# Patient Record
Sex: FEMALE | Race: WHITE | NOT HISPANIC OR LATINO | URBAN - METROPOLITAN AREA
[De-identification: names, ages, dates, MRNs, and addresses within clinical notes are randomized per-mention and may not be internally consistent; named-entity substitution may affect disease eponyms.]

---

## 2017-04-07 RX ORDER — DROXIDOPA 200 MG/1
CAPSULE ORAL
Qty: 90 CAPSULE | OUTPATIENT
Start: 2017-04-07 | End: 2018-11-30

## 2017-04-07 RX ORDER — DROXIDOPA 300 MG/1
CAPSULE ORAL
Qty: 90 CAPSULE | Status: SHIPPED | OUTPATIENT
Start: 2017-04-07 | End: 2017-06-09 | Stop reason: SDUPTHER

## 2017-04-24 ENCOUNTER — TELEPHONE (OUTPATIENT)
Dept: CARDIOLOGY | Facility: CLINIC | Age: 22
End: 2017-04-24

## 2017-04-24 NOTE — TELEPHONE ENCOUNTER
Mom jaiden called stated Ammon had an episode 3 weeks ago and she went to ER at [facility] and they gave her fluids then last night she had another one with CP, arms tingling, numbness in arm and pseudoseizures. Pt told to go home and take valium and pain medication. Mom stated that she has not missed any doses of Northera 500mg TID. Mom doesn't know if they need to go up on dose or wait and see if she has another episode. She is in her 2nd semester of college and mom thinks it could be stress related because she had been doing well. Please advise

## 2017-04-26 RX ORDER — BISOPROLOL FUMARATE 5 MG/1
5 TABLET, FILM COATED ORAL 2 TIMES DAILY
Qty: 60 TABLET | Refills: 5 | Status: SHIPPED | OUTPATIENT
Start: 2017-04-26 | End: 2017-10-25 | Stop reason: SDUPTHER

## 2017-05-16 ENCOUNTER — OFFICE VISIT (OUTPATIENT)
Dept: CARDIOLOGY | Facility: CLINIC | Age: 22
End: 2017-05-16

## 2017-05-16 VITALS
DIASTOLIC BLOOD PRESSURE: 80 MMHG | HEIGHT: 71 IN | BODY MASS INDEX: 38.98 KG/M2 | HEART RATE: 69 BPM | SYSTOLIC BLOOD PRESSURE: 128 MMHG | WEIGHT: 278.4 LBS

## 2017-05-16 DIAGNOSIS — R55 VASOVAGAL SYNCOPE: Primary | ICD-10-CM

## 2017-05-16 PROCEDURE — 99213 OFFICE O/P EST LOW 20 MIN: CPT | Performed by: INTERNAL MEDICINE

## 2017-05-31 RX ORDER — FLUDROCORTISONE ACETATE 0.1 MG/1
TABLET ORAL
Qty: 30 TABLET | Refills: 1 | Status: SHIPPED | OUTPATIENT
Start: 2017-05-31 | End: 2017-07-27 | Stop reason: SDUPTHER

## 2017-06-09 RX ORDER — DROXIDOPA 300 MG/1
300 CAPSULE ORAL 3 TIMES DAILY
Qty: 90 CAPSULE | Refills: 9 | Status: SHIPPED | OUTPATIENT
Start: 2017-06-09 | End: 2018-10-31 | Stop reason: SDUPTHER

## 2017-06-29 RX ORDER — CITALOPRAM 40 MG/1
40 TABLET ORAL DAILY
Qty: 30 TABLET | Refills: 11 | Status: SHIPPED | OUTPATIENT
Start: 2018-02-28 | End: 2019-07-09

## 2017-07-24 ENCOUNTER — LAB (OUTPATIENT)
Dept: LAB | Facility: HOSPITAL | Age: 22
End: 2017-07-24

## 2017-07-24 ENCOUNTER — TRANSCRIBE ORDERS (OUTPATIENT)
Dept: LAB | Facility: HOSPITAL | Age: 22
End: 2017-07-24

## 2017-07-24 DIAGNOSIS — M79.0 RHEUMATISM, UNSPECIFIED AND FIBROSITIS: ICD-10-CM

## 2017-07-24 DIAGNOSIS — I95.1 ORTHOSTATIC HYPOTENSION: Primary | ICD-10-CM

## 2017-07-24 DIAGNOSIS — R00.0 TACHYCARDIA, UNSPECIFIED: ICD-10-CM

## 2017-07-24 DIAGNOSIS — M79.7 RHEUMATISM, UNSPECIFIED AND FIBROSITIS: ICD-10-CM

## 2017-07-24 DIAGNOSIS — R50.9 HYPERTHERMIA-INDUCED DEFECT: ICD-10-CM

## 2017-07-24 DIAGNOSIS — I95.1 ORTHOSTATIC HYPOTENSION: ICD-10-CM

## 2017-07-24 LAB
25(OH)D3 SERPL-MCNC: 37.4 NG/ML
ALBUMIN SERPL-MCNC: 4.4 G/DL (ref 3.2–4.8)
ALBUMIN/GLOB SERPL: 1.6 G/DL (ref 1.5–2.5)
ALP SERPL-CCNC: 118 U/L (ref 25–100)
ALT SERPL W P-5'-P-CCNC: 28 U/L (ref 7–40)
ANION GAP SERPL CALCULATED.3IONS-SCNC: 10 MMOL/L (ref 3–11)
ARTICHOKE IGE QN: 105 MG/DL (ref 0–130)
AST SERPL-CCNC: 20 U/L (ref 0–33)
BASOPHILS # BLD AUTO: 0.02 10*3/MM3 (ref 0–0.2)
BASOPHILS NFR BLD AUTO: 0.1 % (ref 0–1)
BILIRUB SERPL-MCNC: 0.6 MG/DL (ref 0.3–1.2)
BUN BLD-MCNC: 5 MG/DL (ref 9–23)
BUN/CREAT SERPL: 6.3 (ref 7–25)
CALCIUM SPEC-SCNC: 9.4 MG/DL (ref 8.7–10.4)
CHLORIDE SERPL-SCNC: 102 MMOL/L (ref 99–109)
CHOLEST SERPL-MCNC: 146 MG/DL (ref 0–200)
CO2 SERPL-SCNC: 26 MMOL/L (ref 20–31)
CREAT BLD-MCNC: 0.8 MG/DL (ref 0.6–1.3)
DEPRECATED RDW RBC AUTO: 41.9 FL (ref 37–54)
EOSINOPHIL # BLD AUTO: 0.01 10*3/MM3 (ref 0–0.3)
EOSINOPHIL NFR BLD AUTO: 0.1 % (ref 0–3)
ERYTHROCYTE [DISTWIDTH] IN BLOOD BY AUTOMATED COUNT: 13.7 % (ref 11.3–14.5)
GFR SERPL CREATININE-BSD FRML MDRD: 90 ML/MIN/1.73
GLOBULIN UR ELPH-MCNC: 2.8 GM/DL
GLUCOSE BLD-MCNC: 134 MG/DL (ref 70–100)
HCT VFR BLD AUTO: 39.3 % (ref 34.5–44)
HDLC SERPL-MCNC: 44 MG/DL (ref 40–60)
HGB BLD-MCNC: 12.5 G/DL (ref 11.5–15.5)
IMM GRANULOCYTES # BLD: 0.06 10*3/MM3 (ref 0–0.03)
IMM GRANULOCYTES NFR BLD: 0.3 % (ref 0–0.6)
LYMPHOCYTES # BLD AUTO: 0.95 10*3/MM3 (ref 0.6–4.8)
LYMPHOCYTES NFR BLD AUTO: 5.2 % (ref 24–44)
MCH RBC QN AUTO: 26.8 PG (ref 27–31)
MCHC RBC AUTO-ENTMCNC: 31.8 G/DL (ref 32–36)
MCV RBC AUTO: 84.2 FL (ref 80–99)
MONOCYTES # BLD AUTO: 0.86 10*3/MM3 (ref 0–1)
MONOCYTES NFR BLD AUTO: 4.7 % (ref 0–12)
NEUTROPHILS # BLD AUTO: 16.44 10*3/MM3 (ref 1.5–8.3)
NEUTROPHILS NFR BLD AUTO: 89.6 % (ref 41–71)
PLATELET # BLD AUTO: 298 10*3/MM3 (ref 150–450)
PMV BLD AUTO: 10 FL (ref 6–12)
POTASSIUM BLD-SCNC: 3.7 MMOL/L (ref 3.5–5.5)
PROT SERPL-MCNC: 7.2 G/DL (ref 5.7–8.2)
RBC # BLD AUTO: 4.67 10*6/MM3 (ref 3.89–5.14)
SODIUM BLD-SCNC: 138 MMOL/L (ref 132–146)
TRIGL SERPL-MCNC: 72 MG/DL (ref 0–150)
TSH SERPL DL<=0.05 MIU/L-ACNC: 1.12 MIU/ML (ref 0.35–5.35)
WBC NRBC COR # BLD: 18.34 10*3/MM3 (ref 3.5–10.8)

## 2017-07-24 PROCEDURE — 80053 COMPREHEN METABOLIC PANEL: CPT | Performed by: INTERNAL MEDICINE

## 2017-07-24 PROCEDURE — 36415 COLL VENOUS BLD VENIPUNCTURE: CPT

## 2017-07-24 PROCEDURE — 85025 COMPLETE CBC W/AUTO DIFF WBC: CPT | Performed by: INTERNAL MEDICINE

## 2017-07-24 PROCEDURE — 82306 VITAMIN D 25 HYDROXY: CPT | Performed by: INTERNAL MEDICINE

## 2017-07-24 PROCEDURE — 84443 ASSAY THYROID STIM HORMONE: CPT | Performed by: INTERNAL MEDICINE

## 2017-07-24 PROCEDURE — 80061 LIPID PANEL: CPT | Performed by: INTERNAL MEDICINE

## 2017-07-28 RX ORDER — FLUDROCORTISONE ACETATE 0.1 MG/1
TABLET ORAL
Qty: 30 TABLET | Refills: 5 | Status: SHIPPED | OUTPATIENT
Start: 2017-07-28 | End: 2018-02-02 | Stop reason: SDUPTHER

## 2017-09-15 ENCOUNTER — TELEPHONE (OUTPATIENT)
Dept: CARDIOLOGY | Facility: CLINIC | Age: 22
End: 2017-09-15

## 2017-09-15 NOTE — TELEPHONE ENCOUNTER
Called [facility] to get PA on Northera (300mg and 200mg) to take 500mg 1 TID. It will be 72 hrs before a decision is made.

## 2017-09-25 ENCOUNTER — HOSPITAL ENCOUNTER (EMERGENCY)
Facility: HOSPITAL | Age: 22
Discharge: HOME | End: 2017-09-25
Attending: EMERGENCY MEDICINE | Admitting: EMERGENCY MEDICINE

## 2017-09-25 ENCOUNTER — APPOINTMENT (OUTPATIENT)
Dept: CT IMAGING | Facility: HOSPITAL | Age: 22
End: 2017-09-25

## 2017-09-25 VITALS
HEIGHT: 71 IN | OXYGEN SATURATION: 98 % | BODY MASS INDEX: 36.4 KG/M2 | DIASTOLIC BLOOD PRESSURE: 88 MMHG | RESPIRATION RATE: 18 BRPM | TEMPERATURE: 98 F | HEART RATE: 68 BPM | SYSTOLIC BLOOD PRESSURE: 120 MMHG | WEIGHT: 260 LBS

## 2017-09-25 DIAGNOSIS — H05.011 ORBITAL CELLULITIS ON RIGHT: Primary | ICD-10-CM

## 2017-09-25 LAB
B-HCG UR QL: NEGATIVE
INTERNAL NEGATIVE CONTROL: NEGATIVE
INTERNAL POSITIVE CONTROL: POSITIVE
Lab: NORMAL

## 2017-09-25 PROCEDURE — 99284 EMERGENCY DEPT VISIT MOD MDM: CPT

## 2017-09-25 PROCEDURE — 70486 CT MAXILLOFACIAL W/O DYE: CPT

## 2017-09-25 RX ORDER — CLINDAMYCIN HYDROCHLORIDE 300 MG/1
300 CAPSULE ORAL 4 TIMES DAILY
Qty: 40 CAPSULE | Refills: 0 | Status: SHIPPED | OUTPATIENT
Start: 2017-09-25 | End: 2017-12-13

## 2017-09-25 RX ORDER — TETRACAINE HYDROCHLORIDE 5 MG/ML
2 SOLUTION OPHTHALMIC ONCE
Status: COMPLETED | OUTPATIENT
Start: 2017-09-25 | End: 2017-09-25

## 2017-09-25 RX ORDER — OXYCODONE AND ACETAMINOPHEN 10; 325 MG/1; MG/1
1 TABLET ORAL ONCE
Status: COMPLETED | OUTPATIENT
Start: 2017-09-25 | End: 2017-09-25

## 2017-09-25 RX ADMIN — OXYCODONE HYDROCHLORIDE AND ACETAMINOPHEN 1 TABLET: 10; 325 TABLET ORAL at 10:11

## 2017-09-25 RX ADMIN — TETRACAINE HYDROCHLORIDE 2 DROP: 5 SOLUTION OPHTHALMIC at 10:12

## 2017-09-25 NOTE — DISCHARGE INSTRUCTIONS
Clindamycin as prescribed.  Follow up with [facility] on Thursday at 10:45.  Return if acutely worse.

## 2017-09-25 NOTE — ED PROVIDER NOTES
Subjective   HPI Comments: 22-year-old female presents to the emergency department with complaints of right eye pain.  The pain has been present for about one week.  The patient denies any injury to the eye.  She has had no redness or drainage from the eye.  The patient states that she was seen by an optometrist at Dr. Sy's office 3 days ago and was told that her eye exam was normal.  She states that she had her intraocular pressure checked at that time as well and it was normal.  The patient was seen at an urgent treatment center today and sent to our emergency department for further evaluation.  The patient wears glasses but does not use any contact lenses.  She states that she has had some blurred vision in the right thigh since her pain began a week ago.  She has had no relief with some leftover Lortab.  She denies any sinus congestion.  Past medical history of POTS.  She is a nonsmoker.  No alcohol or drug use.  Her PCP is Dr. Aleman.    Patient is a 22 y.o. female presenting with eye problem.   History provided by:  Patient  Eye Problem   Location:  Right eye  Quality:  Aching  Severity:  Moderate  Onset quality:  Unable to specify  Duration:  1 week  Timing:  Constant  Progression:  Waxing and waning  Chronicity:  New  Context: not direct trauma    Relieved by:  Nothing  Worsened by:  Nothing  Ineffective treatments:  None tried  Associated symptoms: blurred vision (right eye) and photophobia    Associated symptoms: no crusting, no discharge, no double vision, no facial rash, no headaches, no nausea, no redness, no swelling, no tearing, no vomiting and no weakness        Review of Systems   Constitutional: Negative for chills and fever.   HENT: Negative for congestion, ear pain, nosebleeds, rhinorrhea, sinus pain, sinus pressure and sore throat.    Eyes: Positive for blurred vision (right eye), photophobia, pain and visual disturbance (blurred vision right eye). Negative for double vision, discharge and  redness.   Respiratory: Negative for cough, shortness of breath and wheezing.    Cardiovascular: Negative for chest pain, palpitations and leg swelling.   Gastrointestinal: Negative for abdominal pain, blood in stool, diarrhea, nausea and vomiting.   Endocrine: Negative.    Genitourinary: Negative for dysuria, hematuria and urgency.   Musculoskeletal: Negative for arthralgias and back pain.   Skin: Negative for pallor and rash.   Allergic/Immunologic: Negative for immunocompromised state.   Neurological: Negative for dizziness, speech difficulty, weakness and headaches.   Hematological: Negative for adenopathy. Does not bruise/bleed easily.   Psychiatric/Behavioral: Negative.        Past Medical History:   Diagnosis Date   • Migraine    • MRSA (methicillin resistant Staphylococcus aureus)     Frequent MRSA skin infections, active since age 5.       Allergies   Allergen Reactions   • Eggs Or Egg-Derived Products Hives, Swelling and Angioedema   • Rizatriptan Rash       Past Surgical History:   Procedure Laterality Date   • CHOLECYSTECTOMY     • NOSE SURGERY     • TONSILLECTOMY AND ADENOIDECTOMY         History reviewed. No pertinent family history.    Social History     Social History   • Marital status: Single     Spouse name: N/A   • Number of children: N/A   • Years of education: N/A     Social History Main Topics   • Smoking status: Never Smoker   • Smokeless tobacco: None   • Alcohol use Yes      Comment: once a month   • Drug use: No   • Sexual activity: Defer     Other Topics Concern   • None     Social History Narrative           Objective   Physical Exam   Constitutional: She is oriented to person, place, and time. She appears well-developed and well-nourished. No distress.   HENT:   Head: Normocephalic and atraumatic.   Nose: Nose normal.   Mouth/Throat: Oropharynx is clear and moist.   Eyes: Conjunctivae and EOM are normal. Pupils are equal, round, and reactive to light. Left eye exhibits no discharge. No  scleral icterus.   No scleral injection.  No drainage or crusting.  Clear conjunctiva with normal-appearing anterior chamber.  Some pain in the right supraorbital region on palpation.  No redness or swelling noted.  No proptosis.  Normal extraocular motion.  No pain on palpation over the sinuses.   Neck: Normal range of motion. Neck supple.   Cardiovascular: Normal rate, regular rhythm and normal heart sounds.    No murmur heard.  Pulmonary/Chest: Effort normal and breath sounds normal. No respiratory distress. She has no wheezes. She has no rales. She exhibits no tenderness.   Abdominal: Soft. Bowel sounds are normal. There is no tenderness.   Musculoskeletal: Normal range of motion. She exhibits no edema or tenderness.   Neurological: She is alert and oriented to person, place, and time.   Skin: Skin is warm and dry. No rash noted. She is not diaphoretic.   Psychiatric: She has a normal mood and affect.   Nursing note and vitals reviewed.      Procedures         ED Course  ED Course    Pt's visual acuity with glasses is 20/25 OD and 20/15 OS.  Intraoccular pressure is normal at 19 on the right.  CT shows a mild orbital cellulitis on the right with minimal preseptal edema.  Otherwise negative.  Normal sinuses.  Will discharge home on antibiotics and have f/u with  ophthalmology.   I spoke with  ophthalmology clinic and they will see her on Thursday at 10:45.    Recent Results (from the past 24 hour(s))   POCT Pregnancy, urine    Collection Time: 09/25/17  9:37 AM   Result Value Ref Range    HCG, Urine, QL Negative Negative    Lot Number [id number]     Internal Positive Control Positive     Internal Negative Control Negative      Note: In addition to lab results from this visit, the labs listed above may include labs taken at another facility or during a different encounter within the last 24 hours. Please correlate lab times with ED admission and discharge times for further clarification of the services performed  "during this visit.    CT Sinus Without Contrast   Final Result   Minimal cellulitis seen overlying the right orbit with   minimal preseptal edema. Remainder of the sinuses are clear.       D:  09/25/2017   E:  09/25/2017           This report was finalized on 9/25/2017 10:57 AM by Dr. Tanja Garber MD.            Vitals:    09/25/17 0850 09/25/17 1030   BP: 129/89 127/77   Pulse: 71    Resp: 16    Temp: 98 °F (36.7 °C)    TempSrc: Oral    SpO2: 98% 96%   Weight: 260 lb (118 kg)    Height: 71\" (180.3 cm)      Medications   tetracaine (ALTACAINE) 0.5 % ophthalmic solution 2 drop (2 drops Right Eye Given 9/25/17 1012)   oxyCODONE-acetaminophen (PERCOCET)  MG per tablet 1 tablet (1 tablet Oral Given 9/25/17 1011)     ECG/EMG Results (last 24 hours)     ** No results found for the last 24 hours. **                      [facility]    Final diagnoses:   Orbital cellulitis on right            VERONICA Earl  09/25/17 1116    "

## 2017-10-26 RX ORDER — BISOPROLOL FUMARATE 5 MG/1
5 TABLET, FILM COATED ORAL 2 TIMES DAILY
Qty: 60 TABLET | Refills: 11 | Status: SHIPPED | OUTPATIENT
Start: 2018-02-28 | End: 2019-08-04 | Stop reason: SDUPTHER

## 2017-11-18 ENCOUNTER — APPOINTMENT (OUTPATIENT)
Dept: LAB | Facility: HOSPITAL | Age: 22
End: 2017-11-18

## 2017-11-18 ENCOUNTER — TRANSCRIBE ORDERS (OUTPATIENT)
Dept: LAB | Facility: HOSPITAL | Age: 22
End: 2017-11-18

## 2017-11-18 DIAGNOSIS — R89.9 ABNORMAL PLEURAL FLUID: Primary | ICD-10-CM

## 2017-11-18 LAB — CRP SERPL-MCNC: 1.83 MG/DL (ref 0–1)

## 2017-11-18 PROCEDURE — 36415 COLL VENOUS BLD VENIPUNCTURE: CPT | Performed by: NURSE PRACTITIONER

## 2017-11-18 PROCEDURE — 86063 ANTISTREPTOLYSIN O SCREEN: CPT | Performed by: NURSE PRACTITIONER

## 2017-11-18 PROCEDURE — 86060 ANTISTREPTOLYSIN O TITER: CPT | Performed by: NURSE PRACTITIONER

## 2017-11-18 PROCEDURE — 86140 C-REACTIVE PROTEIN: CPT | Performed by: NURSE PRACTITIONER

## 2017-11-19 LAB
ASO AB SERPL-ACNC: POSITIVE [IU]/ML
ASO AB TITR SER: ABNORMAL {TITER}

## 2017-11-20 ENCOUNTER — LAB REQUISITION (OUTPATIENT)
Dept: LAB | Facility: HOSPITAL | Age: 22
End: 2017-11-20

## 2017-11-20 ENCOUNTER — TRANSCRIBE ORDERS (OUTPATIENT)
Dept: GENERAL RADIOLOGY | Facility: HOSPITAL | Age: 22
End: 2017-11-20

## 2017-11-20 ENCOUNTER — HOSPITAL ENCOUNTER (OUTPATIENT)
Dept: CT IMAGING | Facility: HOSPITAL | Age: 22
Discharge: HOME | End: 2017-11-20
Admitting: NURSE PRACTITIONER

## 2017-11-20 DIAGNOSIS — H57.12 LEFT EYE PAIN: ICD-10-CM

## 2017-11-20 DIAGNOSIS — Z00.00 ROUTINE GENERAL MEDICAL EXAMINATION AT A HEALTH CARE FACILITY: ICD-10-CM

## 2017-11-20 LAB
FLUAV SUBTYP SPEC NAA+PROBE: NOT DETECTED
FLUBV RNA ISLT QL NAA+PROBE: NOT DETECTED

## 2017-11-20 PROCEDURE — 0 IOPAMIDOL 61 % SOLUTION: Performed by: NURSE PRACTITIONER

## 2017-11-20 PROCEDURE — 70486 CT MAXILLOFACIAL W/O DYE: CPT

## 2017-11-20 PROCEDURE — 87502 INFLUENZA DNA AMP PROBE: CPT | Performed by: NURSE PRACTITIONER

## 2017-11-20 PROCEDURE — 70488 CT MAXILLOFACIAL W/O & W/DYE: CPT

## 2017-11-20 RX ADMIN — IOPAMIDOL 75 ML: 612 INJECTION, SOLUTION INTRAVENOUS at 13:45

## 2017-12-13 ENCOUNTER — OFFICE VISIT (OUTPATIENT)
Dept: CARDIOLOGY | Facility: CLINIC | Age: 22
End: 2017-12-13

## 2017-12-13 ENCOUNTER — HOSPITAL ENCOUNTER (OUTPATIENT)
Dept: CARDIOLOGY | Facility: HOSPITAL | Age: 22
Discharge: HOME | End: 2017-12-13
Attending: INTERNAL MEDICINE | Admitting: INTERNAL MEDICINE

## 2017-12-13 VITALS
SYSTOLIC BLOOD PRESSURE: 120 MMHG | HEART RATE: 76 BPM | WEIGHT: 276 LBS | BODY MASS INDEX: 38.64 KG/M2 | HEIGHT: 71 IN | DIASTOLIC BLOOD PRESSURE: 82 MMHG

## 2017-12-13 VITALS — BODY MASS INDEX: 38.64 KG/M2 | HEIGHT: 71 IN | WEIGHT: 276 LBS

## 2017-12-13 DIAGNOSIS — R55 VASOVAGAL SYNCOPE: Primary | ICD-10-CM

## 2017-12-13 LAB
BH CV ECHO MEAS - AO ROOT AREA (BSA CORRECTED): 0.98
BH CV ECHO MEAS - AO ROOT AREA: 4.4 CM^2
BH CV ECHO MEAS - AO ROOT DIAM: 2.4 CM
BH CV ECHO MEAS - BSA(HAYCOCK): 2.6 M^2
BH CV ECHO MEAS - BSA: 2.4 M^2
BH CV ECHO MEAS - BZI_BMI: 38.6 KILOGRAMS/M^2
BH CV ECHO MEAS - BZI_METRIC_HEIGHT: 180 CM
BH CV ECHO MEAS - BZI_METRIC_WEIGHT: 125.2 KG
BH CV ECHO MEAS - CONTRAST EF (2CH): 41.8 ML/M^2
BH CV ECHO MEAS - CONTRAST EF 4CH: 53.9 ML/M^2
BH CV ECHO MEAS - EDV(CUBED): 136.3 ML
BH CV ECHO MEAS - EDV(MOD-SP2): 113 ML
BH CV ECHO MEAS - EDV(MOD-SP4): 147 ML
BH CV ECHO MEAS - EDV(TEICH): 126.4 ML
BH CV ECHO MEAS - EF(CUBED): 76.3 %
BH CV ECHO MEAS - EF(MOD-SP2): 41.8 %
BH CV ECHO MEAS - EF(MOD-SP4): 53.9 %
BH CV ECHO MEAS - EF(TEICH): 68 %
BH CV ECHO MEAS - ESV(CUBED): 32.2 ML
BH CV ECHO MEAS - ESV(MOD-SP2): 65.8 ML
BH CV ECHO MEAS - ESV(MOD-SP4): 67.8 ML
BH CV ECHO MEAS - ESV(TEICH): 40.4 ML
BH CV ECHO MEAS - FS: 38.2 %
BH CV ECHO MEAS - IVS/LVPW: 0.94
BH CV ECHO MEAS - IVSD: 1 CM
BH CV ECHO MEAS - LAT PEAK E' VEL: 13.4 CM/SEC
BH CV ECHO MEAS - LV DIASTOLIC VOL/BSA (35-75): 60.9 ML/M^2
BH CV ECHO MEAS - LV MASS(C)D: 197.6 GRAMS
BH CV ECHO MEAS - LV MASS(C)DI: 81.8 GRAMS/M^2
BH CV ECHO MEAS - LV SYSTOLIC VOL/BSA (12-30): 28.1 ML/M^2
BH CV ECHO MEAS - LVIDD: 5.1 CM
BH CV ECHO MEAS - LVIDS: 3.2 CM
BH CV ECHO MEAS - LVLD AP2: 7.5 CM
BH CV ECHO MEAS - LVLD AP4: 7.6 CM
BH CV ECHO MEAS - LVLS AP2: 6.3 CM
BH CV ECHO MEAS - LVLS AP4: 6.4 CM
BH CV ECHO MEAS - LVPWD: 1 CM
BH CV ECHO MEAS - MED PEAK E' VEL: 9.95 CM/SEC
BH CV ECHO MEAS - MV A MAX VEL: 36.5 CM/SEC
BH CV ECHO MEAS - MV E MAX VEL: 69.7 CM/SEC
BH CV ECHO MEAS - MV E/A: 1.9
BH CV ECHO MEAS - PA ACC TIME: 0.16 SEC
BH CV ECHO MEAS - PA PR(ACCEL): 6.1 MMHG
BH CV ECHO MEAS - PI END-D VEL: 107.5 CM/SEC
BH CV ECHO MEAS - RAP SYSTOLE: 3 MMHG
BH CV ECHO MEAS - RVDD: 1.9 CM
BH CV ECHO MEAS - RVSP: 26 MMHG
BH CV ECHO MEAS - SI(CUBED): 43.1 ML/M^2
BH CV ECHO MEAS - SI(MOD-SP2): 19.5 ML/M^2
BH CV ECHO MEAS - SI(MOD-SP4): 32.8 ML/M^2
BH CV ECHO MEAS - SI(TEICH): 35.6 ML/M^2
BH CV ECHO MEAS - SV(CUBED): 104 ML
BH CV ECHO MEAS - SV(MOD-SP2): 47.2 ML
BH CV ECHO MEAS - SV(MOD-SP4): 79.2 ML
BH CV ECHO MEAS - SV(TEICH): 86 ML
BH CV ECHO MEAS - TAPSE (>1.6): 2.43 CM2
BH CV ECHO MEAS - TR MAX VEL: 242.3 CM/SEC
BH CV XLRA - RV BASE: 4.1 CM
BH CV XLRA - RV LENGTH: 7.2 CM
BH CV XLRA - RV MID: 3.4 CM
BH CV XLRA - TDI S': 14.5 CM/SEC
E/E' RATIO: 6.1
LEFT ATRIUM VOLUME INDEX: 31.8 ML/M2
LEFT ATRIUM VOLUME: 76.7 CM3
LV EF 2D ECHO EST: 55 %
MAXIMAL PREDICTED HEART RATE: 198 BPM
STRESS TARGET HR: 168 BPM

## 2017-12-13 PROCEDURE — 93306 TTE W/DOPPLER COMPLETE: CPT | Performed by: INTERNAL MEDICINE

## 2017-12-13 PROCEDURE — 93306 TTE W/DOPPLER COMPLETE: CPT

## 2017-12-13 PROCEDURE — 99213 OFFICE O/P EST LOW 20 MIN: CPT | Performed by: INTERNAL MEDICINE

## 2017-12-13 RX ORDER — TRAZODONE HYDROCHLORIDE 100 MG/1
100 TABLET ORAL NIGHTLY
COMMUNITY
End: 2018-11-30

## 2017-12-13 NOTE — PROGRESS NOTES
Ammon Vizcarra  1995 12/13/2017    [facility]     Alexis Alemna MD  [address]  [place] [address]    Chief Complaint   Patient presents with   • Loss of Consciousness     PROBLEM LIST:     1.  Syncope, active September 2014:   a. Tilt table, September 2014, showing  cardioinhibitory and vasodepressor components, symptomatic.    b. Event monitor, 10/27/2014:  Sinus tachycardia up to 180 bpm with less strenuous activities, such as activities of daily living.   c. Echocardiogram, 11/07/2014:  EF 53%, IVS 0.95, LVPW  0.95, mild MR and mild TR.   d. Initiation of midodrine and Celexa, November 2014.    e. Hospitalization 02/12/2015 through 02/15 2015 at [facility] for a syncopal episode with negative EEG and CT scan of the head, possible etiology of myoclonic  jerking caused by baclofen; however, the patient is now back on baclofen.    f. Extensive evaluation at [facility], July 2015 with neural evaluation showing no epileptic seizures, normal tilt table test and normal QSART.  g.  Initiation of Northera, August 2015.    2. Complex regional pain syndrome  with significant muscle spasm and paresthesias:   a. Followed by Brenton Pineda MD, [facility], APRN.  3. Frequent MRSA skin infections, active since  age 5.   4. Migraine headaches.  5. Surgical history:   a. Cholecystectomy.   b.  Tonsillectomy and adenoidectomy.  c. Nasal surgery.     Allergies  Allergies   Allergen Reactions   • Eggs Or Egg-Derived Products Hives, Swelling and Angioedema   • Rizatriptan Rash       Current Medications    Current Outpatient Prescriptions:   •  albuterol (PROAIR HFA) 108 (90 BASE) MCG/ACT inhaler, Daily., Disp: , Rfl:   •  Ascorbic Acid (VITAMIN C PO), Take  by mouth 2 (Two) Times a Day., Disp: , Rfl:   •  bisoprolol (ZEBeta) 5 MG tablet, TAKE ONE TABLET BY MOUTH TWICE A DAY, Disp: 60 tablet, Rfl: 11  •  Cetirizine HCl  (ZYRTEC ALLERGY PO), Take  by mouth Daily., Disp: , Rfl:   •  Cholecalciferol (VITAMIN D PO), Take  by mouth 2 (Two) Times a Day., Disp: , Rfl:   •  citalopram (CeleXA) 40 MG tablet, TAKE ONE TABLET BY MOUTH DAILY, Disp: 30 tablet, Rfl: 11  •  droxidopa (NORTHERA) 300 MG capsule capsule, Take 1 capsule by mouth 3 (Three) Times a Day., Disp: 90 capsule, Rfl: 9  •  EPINEPHrine (EPIPEN IJ), Inject  as directed As Needed., Disp: , Rfl:   •  fludrocortisone 0.1 MG tablet, TAKE ONE TABLET BY MOUTH EVERY MORNING, Disp: 30 tablet, Rfl: 5  •  gabapentin (NEURONTIN) 300 MG capsule, Take 800 mg by mouth 2 (Two) Times a Day., Disp: , Rfl:   •  Hydrocodone-Acetaminophen (LORTAB PO), Take 5 mg by mouth Daily., Disp: , Rfl:   •  Levonorgest-Eth Estrad 91-Day (SEASONIQUE) 0.15-0.03 &0.01 MG tablet, Take  by mouth Daily., Disp: , Rfl:   •  MAGNESIUM PO, Take  by mouth Every Night., Disp: , Rfl:   •  midodrine (PROAMATINE) 5 MG tablet, Take  by mouth Daily As Needed., Disp: , Rfl:   •  naproxen sodium (ALEVE) 220 MG tablet, Take  by mouth As Needed., Disp: , Rfl:   •  NORTHERA 200 MG capsule, TAKE 1 CAPSULE BY MOUTH THREE TIMES DAILY, LAST DOSE NOT TO BE TAKEN AFTER 5 PM (TAKE WITH ONE 300MG CAPSULE FOR A TOTAL DOSE  MG), Disp: 90 capsule, Rfl: PRN  •  ondansetron (ZOFRAN) 4 MG tablet, Take  by mouth As Needed., Disp: , Rfl:   •  promethazine (PHENERGAN) 25 MG tablet, Take  by mouth As Needed., Disp: , Rfl:   •  promethazine (PROMETHEGAN) 25 MG suppository, As Needed., Disp: , Rfl:   •  ranitidine (ZANTAC) 75 MG tablet, Take 75 mg by mouth 2 (Two) Times a Day., Disp: , Rfl:   •  traZODone (DESYREL) 100 MG tablet, Take 100 mg by mouth Every Night., Disp: , Rfl:     History of Present Illness   HPI    Pt presents for follow up of syncope. She had one syncopal episode in October when she was at school.  This was brought on by dehydration and stress as she was studying for midterms.  She went to the ED and was discharged.  She will  "occasionally have an episode of dizziness but she will sit down and hydrate and this will resolve within 30 minutes or so.  She recently had strep throat and developed rash/sores on her legs while on antibiotics.  She had positive ASO titers.  She had cellulitis per CT sinuses.  She has been off antibiotics for the last week.  She has been seeing Dr. Contreras with ID and recommended an ECHO and wanted a second opinion on this.  Tolerating medications without difficulty.    ROS:  General:  Denies fatigue, weight gain or loss  Cardiovascular:  + syncope, denies CP, PND, edema or palpitations.  Pulmonary:  Denies TERAN, cough, or wheezing    Vitals:    12/13/17 0932   BP: 120/82   BP Location: Right arm   Patient Position: Sitting   Pulse: 76   Weight: 125 kg (276 lb)   Height: 180.3 cm (71\")       PE:  GEN: Well nourished, Well- developed  No acute distress  HEENT: Normocephalic, Atraumatic, PERRLA, moist mucous membranes  NECK: No adenopathy, trachea midline, neck supple, NO JVD, no thyromegaly  CARD: Regular rhythm and normal rate, S1 S2, no murmur, gallop, rub, or click  LUNGS: Clear to auscultation bilaterally, normal respiratory effort, no wheezes, rhonchi or rales.  EXTREMITIES:No gross deformities,  No clubbing, cyanosis, or edema  SKIN: Warm, dry, intact, no bleeding, bruising, or rashes noted.    Diagnostic Data:  Procedures    1. Vasovagal syncope        Plan:  1) Syncope  - 1 episode due to dehydration and stress, otherwise doing well. Continue florinef, northera, zebeta.  2) Recurrent strep involving skin and sinus issues:   - Will obtain ECHO R/0 endocarditis    F/up in 8 months    Scribed for Chavez Moran MD by Brayan Dubon. 12/13/2017  9:51 AM     Scribed for Chavez Moran MD by FRIEDA Tejeda. 12/13/2017  10:03 AM    IChavez MD, personally performed the services described in this documentation as scribed by the above named individual in my presence, and it is both accurate " and complete.  12/13/2017  10:05 AM

## 2017-12-15 ENCOUNTER — TRANSCRIBE ORDERS (OUTPATIENT)
Dept: LAB | Facility: HOSPITAL | Age: 22
End: 2017-12-15

## 2017-12-15 ENCOUNTER — LAB (OUTPATIENT)
Dept: LAB | Facility: HOSPITAL | Age: 22
End: 2017-12-15

## 2017-12-15 DIAGNOSIS — R79.9 ABNORMAL BLOOD CHEMISTRY: ICD-10-CM

## 2017-12-15 DIAGNOSIS — H05.012 ORBITAL CELLULITIS ON LEFT: ICD-10-CM

## 2017-12-15 LAB
BASOPHILS # BLD AUTO: 0.03 10*3/MM3 (ref 0–0.2)
BASOPHILS NFR BLD AUTO: 0.4 % (ref 0–1)
CRP SERPL-MCNC: 1.73 MG/DL (ref 0–1)
DEPRECATED RDW RBC AUTO: 40.8 FL (ref 37–54)
EOSINOPHIL # BLD AUTO: 0.44 10*3/MM3 (ref 0–0.3)
EOSINOPHIL NFR BLD AUTO: 6.2 % (ref 0–3)
ERYTHROCYTE [DISTWIDTH] IN BLOOD BY AUTOMATED COUNT: 13.3 % (ref 11.3–14.5)
ERYTHROCYTE [SEDIMENTATION RATE] IN BLOOD: 28 MM/HR (ref 0–20)
HCT VFR BLD AUTO: 39.7 % (ref 34.5–44)
HGB BLD-MCNC: 12.6 G/DL (ref 11.5–15.5)
IMM GRANULOCYTES # BLD: 0.01 10*3/MM3 (ref 0–0.03)
IMM GRANULOCYTES NFR BLD: 0.1 % (ref 0–0.6)
LYMPHOCYTES # BLD AUTO: 1.82 10*3/MM3 (ref 0.6–4.8)
LYMPHOCYTES NFR BLD AUTO: 25.5 % (ref 24–44)
MCH RBC QN AUTO: 26.8 PG (ref 27–31)
MCHC RBC AUTO-ENTMCNC: 31.7 G/DL (ref 32–36)
MCV RBC AUTO: 84.5 FL (ref 80–99)
MONOCYTES # BLD AUTO: 0.37 10*3/MM3 (ref 0–1)
MONOCYTES NFR BLD AUTO: 5.2 % (ref 0–12)
NEUTROPHILS # BLD AUTO: 4.47 10*3/MM3 (ref 1.5–8.3)
NEUTROPHILS NFR BLD AUTO: 62.6 % (ref 41–71)
PLATELET # BLD AUTO: 396 10*3/MM3 (ref 150–450)
PMV BLD AUTO: 9.9 FL (ref 6–12)
RBC # BLD AUTO: 4.7 10*6/MM3 (ref 3.89–5.14)
WBC NRBC COR # BLD: 7.14 10*3/MM3 (ref 3.5–10.8)

## 2017-12-15 PROCEDURE — 86063 ANTISTREPTOLYSIN O SCREEN: CPT | Performed by: NURSE PRACTITIONER

## 2017-12-15 PROCEDURE — 36415 COLL VENOUS BLD VENIPUNCTURE: CPT | Performed by: NURSE PRACTITIONER

## 2017-12-15 PROCEDURE — 85652 RBC SED RATE AUTOMATED: CPT

## 2017-12-15 PROCEDURE — 86140 C-REACTIVE PROTEIN: CPT

## 2017-12-15 PROCEDURE — 86060 ANTISTREPTOLYSIN O TITER: CPT | Performed by: NURSE PRACTITIONER

## 2017-12-15 PROCEDURE — 85025 COMPLETE CBC W/AUTO DIFF WBC: CPT

## 2017-12-15 PROCEDURE — 87040 BLOOD CULTURE FOR BACTERIA: CPT

## 2017-12-18 LAB
ASO AB SERPL-ACNC: POSITIVE [IU]/ML
ASO AB TITR SER: ABNORMAL {TITER}

## 2017-12-20 LAB
BACTERIA SPEC AEROBE CULT: NORMAL
BACTERIA SPEC AEROBE CULT: NORMAL

## 2018-02-02 RX ORDER — FLUDROCORTISONE ACETATE 0.1 MG/1
0.1 TABLET ORAL
Qty: 30 TABLET | Refills: 11 | Status: SHIPPED | OUTPATIENT
Start: 2018-02-28 | End: 2019-05-01 | Stop reason: SDUPTHER

## 2018-06-20 ENCOUNTER — HOSPITAL ENCOUNTER (EMERGENCY)
Facility: HOSPITAL | Age: 23
Discharge: HOME | End: 2018-06-21
Attending: EMERGENCY MEDICINE | Admitting: EMERGENCY MEDICINE

## 2018-06-20 ENCOUNTER — APPOINTMENT (OUTPATIENT)
Dept: CT IMAGING | Facility: HOSPITAL | Age: 23
End: 2018-06-20

## 2018-06-20 DIAGNOSIS — R10.32 ABDOMINAL PAIN, ACUTE, LEFT LOWER QUADRANT: Primary | ICD-10-CM

## 2018-06-20 DIAGNOSIS — R82.71 BACTERIURIA: ICD-10-CM

## 2018-06-20 LAB
ALBUMIN SERPL-MCNC: 4.38 G/DL (ref 3.2–4.8)
ALBUMIN/GLOB SERPL: 1.4 G/DL (ref 1.5–2.5)
ALP SERPL-CCNC: 125 U/L (ref 25–100)
ALT SERPL W P-5'-P-CCNC: 22 U/L (ref 7–40)
ANION GAP SERPL CALCULATED.3IONS-SCNC: 10 MMOL/L (ref 3–11)
AST SERPL-CCNC: 20 U/L (ref 0–33)
B-HCG UR QL: NEGATIVE
BACTERIA UR QL AUTO: ABNORMAL /HPF
BASOPHILS # BLD AUTO: 0.02 10*3/MM3 (ref 0–0.2)
BASOPHILS NFR BLD AUTO: 0.2 % (ref 0–1)
BILIRUB SERPL-MCNC: 0.5 MG/DL (ref 0.3–1.2)
BILIRUB UR QL STRIP: NEGATIVE
BUN BLD-MCNC: 8 MG/DL (ref 9–23)
BUN/CREAT SERPL: 11.8 (ref 7–25)
CALCIUM SPEC-SCNC: 9.5 MG/DL (ref 8.7–10.4)
CHLORIDE SERPL-SCNC: 104 MMOL/L (ref 99–109)
CLARITY UR: ABNORMAL
CO2 SERPL-SCNC: 26 MMOL/L (ref 20–31)
COLOR UR: YELLOW
CREAT BLD-MCNC: 0.68 MG/DL (ref 0.6–1.3)
DEPRECATED RDW RBC AUTO: 38.8 FL (ref 37–54)
EOSINOPHIL # BLD AUTO: 0.25 10*3/MM3 (ref 0–0.3)
EOSINOPHIL NFR BLD AUTO: 2.3 % (ref 0–3)
ERYTHROCYTE [DISTWIDTH] IN BLOOD BY AUTOMATED COUNT: 13.1 % (ref 11.3–14.5)
GFR SERPL CREATININE-BSD FRML MDRD: 107 ML/MIN/1.73
GLOBULIN UR ELPH-MCNC: 3.2 GM/DL
GLUCOSE BLD-MCNC: 101 MG/DL (ref 70–100)
GLUCOSE UR STRIP-MCNC: NEGATIVE MG/DL
HCT VFR BLD AUTO: 40.8 % (ref 34.5–44)
HGB BLD-MCNC: 13.3 G/DL (ref 11.5–15.5)
HGB UR QL STRIP.AUTO: NEGATIVE
HOLD SPECIMEN: NORMAL
HYALINE CASTS UR QL AUTO: ABNORMAL /LPF
IMM GRANULOCYTES # BLD: 0.01 10*3/MM3 (ref 0–0.03)
IMM GRANULOCYTES NFR BLD: 0.1 % (ref 0–0.6)
INTERNAL NEGATIVE CONTROL: NEGATIVE
INTERNAL POSITIVE CONTROL: POSITIVE
KETONES UR QL STRIP: ABNORMAL
LEUKOCYTE ESTERASE UR QL STRIP.AUTO: ABNORMAL
LIPASE SERPL-CCNC: 30 U/L (ref 6–51)
LYMPHOCYTES # BLD AUTO: 2.21 10*3/MM3 (ref 0.6–4.8)
LYMPHOCYTES NFR BLD AUTO: 20.5 % (ref 24–44)
Lab: NORMAL
MCH RBC QN AUTO: 27.1 PG (ref 27–31)
MCHC RBC AUTO-ENTMCNC: 32.6 G/DL (ref 32–36)
MCV RBC AUTO: 83.3 FL (ref 80–99)
MONOCYTES # BLD AUTO: 0.65 10*3/MM3 (ref 0–1)
MONOCYTES NFR BLD AUTO: 6 % (ref 0–12)
NEUTROPHILS # BLD AUTO: 7.64 10*3/MM3 (ref 1.5–8.3)
NEUTROPHILS NFR BLD AUTO: 70.9 % (ref 41–71)
NITRITE UR QL STRIP: NEGATIVE
PH UR STRIP.AUTO: 6.5 [PH] (ref 5–8)
PLATELET # BLD AUTO: 357 10*3/MM3 (ref 150–450)
PMV BLD AUTO: 9.3 FL (ref 6–12)
POTASSIUM BLD-SCNC: 3.5 MMOL/L (ref 3.5–5.5)
PROT SERPL-MCNC: 7.6 G/DL (ref 5.7–8.2)
PROT UR QL STRIP: NEGATIVE
RBC # BLD AUTO: 4.9 10*6/MM3 (ref 3.89–5.14)
RBC # UR: ABNORMAL /HPF
REF LAB TEST METHOD: ABNORMAL
SODIUM BLD-SCNC: 140 MMOL/L (ref 132–146)
SP GR UR STRIP: >=1.03 (ref 1–1.03)
SQUAMOUS #/AREA URNS HPF: ABNORMAL /HPF
UROBILINOGEN UR QL STRIP: ABNORMAL
WBC NRBC COR # BLD: 10.78 10*3/MM3 (ref 3.5–10.8)
WBC UR QL AUTO: ABNORMAL /HPF
WHOLE BLOOD HOLD SPECIMEN: NORMAL
WHOLE BLOOD HOLD SPECIMEN: NORMAL

## 2018-06-20 PROCEDURE — 96361 HYDRATE IV INFUSION ADD-ON: CPT

## 2018-06-20 PROCEDURE — 25010000002 CEFTRIAXONE PER 250 MG: Performed by: PHYSICIAN ASSISTANT

## 2018-06-20 PROCEDURE — 25010000002 KETOROLAC TROMETHAMINE PER 15 MG: Performed by: PHYSICIAN ASSISTANT

## 2018-06-20 PROCEDURE — 81001 URINALYSIS AUTO W/SCOPE: CPT | Performed by: EMERGENCY MEDICINE

## 2018-06-20 PROCEDURE — 25010000002 ONDANSETRON PER 1 MG: Performed by: PHYSICIAN ASSISTANT

## 2018-06-20 PROCEDURE — 74177 CT ABD & PELVIS W/CONTRAST: CPT

## 2018-06-20 PROCEDURE — 25010000002 IOPAMIDOL 61 % SOLUTION: Performed by: EMERGENCY MEDICINE

## 2018-06-20 PROCEDURE — 25010000002 HYDROMORPHONE PER 4 MG: Performed by: EMERGENCY MEDICINE

## 2018-06-20 PROCEDURE — 99284 EMERGENCY DEPT VISIT MOD MDM: CPT

## 2018-06-20 PROCEDURE — 85025 COMPLETE CBC W/AUTO DIFF WBC: CPT | Performed by: EMERGENCY MEDICINE

## 2018-06-20 PROCEDURE — 96375 TX/PRO/DX INJ NEW DRUG ADDON: CPT

## 2018-06-20 PROCEDURE — 80053 COMPREHEN METABOLIC PANEL: CPT | Performed by: EMERGENCY MEDICINE

## 2018-06-20 PROCEDURE — 96365 THER/PROPH/DIAG IV INF INIT: CPT

## 2018-06-20 PROCEDURE — 83690 ASSAY OF LIPASE: CPT | Performed by: EMERGENCY MEDICINE

## 2018-06-20 RX ORDER — HYOSCYAMINE SULFATE 0.125 MG
125 TABLET,DISINTEGRATING ORAL EVERY 4 HOURS PRN
Status: DISCONTINUED | OUTPATIENT
Start: 2018-06-20 | End: 2018-06-21 | Stop reason: HOSPADM

## 2018-06-20 RX ORDER — CEFTRIAXONE SODIUM 1 G/50ML
1 INJECTION, SOLUTION INTRAVENOUS EVERY 24 HOURS
Status: COMPLETED | OUTPATIENT
Start: 2018-06-20 | End: 2018-06-21

## 2018-06-20 RX ORDER — ONDANSETRON 2 MG/ML
4 INJECTION INTRAMUSCULAR; INTRAVENOUS ONCE
Status: COMPLETED | OUTPATIENT
Start: 2018-06-20 | End: 2018-06-20

## 2018-06-20 RX ORDER — KETOROLAC TROMETHAMINE 15 MG/ML
15 INJECTION, SOLUTION INTRAMUSCULAR; INTRAVENOUS ONCE
Status: COMPLETED | OUTPATIENT
Start: 2018-06-20 | End: 2018-06-20

## 2018-06-20 RX ORDER — HYOSCYAMINE SULFATE 0.125 MG
125 TABLET,DISINTEGRATING ORAL EVERY 4 HOURS PRN
Status: DISCONTINUED | OUTPATIENT
Start: 2018-06-20 | End: 2018-06-20

## 2018-06-20 RX ORDER — SODIUM CHLORIDE 0.9 % (FLUSH) 0.9 %
10 SYRINGE (ML) INJECTION AS NEEDED
Status: DISCONTINUED | OUTPATIENT
Start: 2018-06-20 | End: 2018-06-21 | Stop reason: HOSPADM

## 2018-06-20 RX ADMIN — HYOSCYAMINE SULFATE 0.12 MG: 0.12 TABLET, ORALLY DISINTEGRATING ORAL; SUBLINGUAL at 21:17

## 2018-06-20 RX ADMIN — IOPAMIDOL 95 ML: 612 INJECTION, SOLUTION INTRAVENOUS at 22:16

## 2018-06-20 RX ADMIN — CEFTRIAXONE SODIUM 1 G: 1 INJECTION, SOLUTION INTRAVENOUS at 23:37

## 2018-06-20 RX ADMIN — KETOROLAC TROMETHAMINE 15 MG: 15 INJECTION, SOLUTION INTRAMUSCULAR; INTRAVENOUS at 21:11

## 2018-06-20 RX ADMIN — SODIUM CHLORIDE 1000 ML: 9 INJECTION, SOLUTION INTRAVENOUS at 21:10

## 2018-06-20 RX ADMIN — ONDANSETRON 4 MG: 2 INJECTION INTRAMUSCULAR; INTRAVENOUS at 21:10

## 2018-06-20 RX ADMIN — HYDROMORPHONE HYDROCHLORIDE 0.5 MG: 1 INJECTION, SOLUTION INTRAMUSCULAR; INTRAVENOUS; SUBCUTANEOUS at 23:14

## 2018-06-20 RX ADMIN — HYDROMORPHONE HYDROCHLORIDE 0.5 MG: 1 INJECTION, SOLUTION INTRAMUSCULAR; INTRAVENOUS; SUBCUTANEOUS at 21:11

## 2018-06-21 ENCOUNTER — APPOINTMENT (OUTPATIENT)
Dept: ULTRASOUND IMAGING | Facility: HOSPITAL | Age: 23
End: 2018-06-21

## 2018-06-21 VITALS
HEART RATE: 50 BPM | TEMPERATURE: 98.5 F | WEIGHT: 260 LBS | SYSTOLIC BLOOD PRESSURE: 107 MMHG | HEIGHT: 71 IN | BODY MASS INDEX: 36.4 KG/M2 | DIASTOLIC BLOOD PRESSURE: 66 MMHG | OXYGEN SATURATION: 98 % | RESPIRATION RATE: 18 BRPM

## 2018-06-21 VITALS
DIASTOLIC BLOOD PRESSURE: 78 MMHG | HEIGHT: 71 IN | BODY MASS INDEX: 36.4 KG/M2 | WEIGHT: 260 LBS | OXYGEN SATURATION: 98 % | RESPIRATION RATE: 18 BRPM | HEART RATE: 80 BPM | TEMPERATURE: 98.2 F | SYSTOLIC BLOOD PRESSURE: 117 MMHG

## 2018-06-21 DIAGNOSIS — N73.0 PID (ACUTE PELVIC INFLAMMATORY DISEASE): Primary | ICD-10-CM

## 2018-06-21 LAB
ALBUMIN SERPL-MCNC: 4.01 G/DL (ref 3.2–4.8)
ALBUMIN/GLOB SERPL: 1.3 G/DL (ref 1.5–2.5)
ALP SERPL-CCNC: 114 U/L (ref 25–100)
ALT SERPL W P-5'-P-CCNC: 17 U/L (ref 7–40)
ANION GAP SERPL CALCULATED.3IONS-SCNC: 7 MMOL/L (ref 3–11)
AST SERPL-CCNC: 19 U/L (ref 0–33)
BACTERIA UR QL AUTO: ABNORMAL /HPF
BASOPHILS # BLD AUTO: 0.03 10*3/MM3 (ref 0–0.2)
BASOPHILS NFR BLD AUTO: 0.3 % (ref 0–1)
BILIRUB SERPL-MCNC: 0.5 MG/DL (ref 0.3–1.2)
BILIRUB UR QL STRIP: NEGATIVE
BUN BLD-MCNC: 8 MG/DL (ref 9–23)
BUN/CREAT SERPL: 12.9 (ref 7–25)
CALCIUM SPEC-SCNC: 9 MG/DL (ref 8.7–10.4)
CHLORIDE SERPL-SCNC: 106 MMOL/L (ref 99–109)
CLARITY UR: ABNORMAL
CO2 SERPL-SCNC: 25 MMOL/L (ref 20–31)
COLOR UR: YELLOW
CREAT BLD-MCNC: 0.62 MG/DL (ref 0.6–1.3)
D-LACTATE SERPL-SCNC: 0.7 MMOL/L (ref 0.5–2)
DEPRECATED RDW RBC AUTO: 40.1 FL (ref 37–54)
EOSINOPHIL # BLD AUTO: 0.3 10*3/MM3 (ref 0–0.3)
EOSINOPHIL NFR BLD AUTO: 3.3 % (ref 0–3)
ERYTHROCYTE [DISTWIDTH] IN BLOOD BY AUTOMATED COUNT: 13.3 % (ref 11.3–14.5)
GFR SERPL CREATININE-BSD FRML MDRD: 119 ML/MIN/1.73
GLOBULIN UR ELPH-MCNC: 3 GM/DL
GLUCOSE BLD-MCNC: 81 MG/DL (ref 70–100)
GLUCOSE UR STRIP-MCNC: NEGATIVE MG/DL
HCT VFR BLD AUTO: 38.8 % (ref 34.5–44)
HGB BLD-MCNC: 12.3 G/DL (ref 11.5–15.5)
HGB UR QL STRIP.AUTO: NEGATIVE
HOLD SPECIMEN: NORMAL
HOLD SPECIMEN: NORMAL
HYALINE CASTS UR QL AUTO: ABNORMAL /LPF
IMM GRANULOCYTES # BLD: 0.02 10*3/MM3 (ref 0–0.03)
IMM GRANULOCYTES NFR BLD: 0.2 % (ref 0–0.6)
KETONES UR QL STRIP: NEGATIVE
LEUKOCYTE ESTERASE UR QL STRIP.AUTO: ABNORMAL
LIPASE SERPL-CCNC: 25 U/L (ref 6–51)
LYMPHOCYTES # BLD AUTO: 1.97 10*3/MM3 (ref 0.6–4.8)
LYMPHOCYTES NFR BLD AUTO: 21.8 % (ref 24–44)
MCH RBC QN AUTO: 26.3 PG (ref 27–31)
MCHC RBC AUTO-ENTMCNC: 31.7 G/DL (ref 32–36)
MCV RBC AUTO: 83.1 FL (ref 80–99)
MONOCYTES # BLD AUTO: 0.57 10*3/MM3 (ref 0–1)
MONOCYTES NFR BLD AUTO: 6.3 % (ref 0–12)
NEUTROPHILS # BLD AUTO: 6.17 10*3/MM3 (ref 1.5–8.3)
NEUTROPHILS NFR BLD AUTO: 68.3 % (ref 41–71)
NITRITE UR QL STRIP: NEGATIVE
NRBC BLD MANUAL-RTO: 0 /100 WBC (ref 0–0)
PH UR STRIP.AUTO: 5.5 [PH] (ref 5–8)
PLATELET # BLD AUTO: 360 10*3/MM3 (ref 150–450)
PMV BLD AUTO: 9.7 FL (ref 6–12)
POTASSIUM BLD-SCNC: 4.2 MMOL/L (ref 3.5–5.5)
PROT SERPL-MCNC: 7 G/DL (ref 5.7–8.2)
PROT UR QL STRIP: NEGATIVE
RBC # BLD AUTO: 4.67 10*6/MM3 (ref 3.89–5.14)
RBC # UR: ABNORMAL /HPF
REF LAB TEST METHOD: ABNORMAL
SODIUM BLD-SCNC: 138 MMOL/L (ref 132–146)
SP GR UR STRIP: 1.03 (ref 1–1.03)
SQUAMOUS #/AREA URNS HPF: ABNORMAL /HPF
UROBILINOGEN UR QL STRIP: ABNORMAL
WBC NRBC COR # BLD: 9.04 10*3/MM3 (ref 3.5–10.8)
WBC UR QL AUTO: ABNORMAL /HPF
WHOLE BLOOD HOLD SPECIMEN: NORMAL
WHOLE BLOOD HOLD SPECIMEN: NORMAL

## 2018-06-21 PROCEDURE — 99283 EMERGENCY DEPT VISIT LOW MDM: CPT

## 2018-06-21 PROCEDURE — 96375 TX/PRO/DX INJ NEW DRUG ADDON: CPT

## 2018-06-21 PROCEDURE — 80053 COMPREHEN METABOLIC PANEL: CPT | Performed by: PHYSICIAN ASSISTANT

## 2018-06-21 PROCEDURE — 25010000002 HYDROMORPHONE PER 4 MG: Performed by: EMERGENCY MEDICINE

## 2018-06-21 PROCEDURE — 96361 HYDRATE IV INFUSION ADD-ON: CPT

## 2018-06-21 PROCEDURE — 83690 ASSAY OF LIPASE: CPT | Performed by: PHYSICIAN ASSISTANT

## 2018-06-21 PROCEDURE — 81001 URINALYSIS AUTO W/SCOPE: CPT | Performed by: PHYSICIAN ASSISTANT

## 2018-06-21 PROCEDURE — 25010000002 ONDANSETRON PER 1 MG: Performed by: PHYSICIAN ASSISTANT

## 2018-06-21 PROCEDURE — 87591 N.GONORRHOEAE DNA AMP PROB: CPT | Performed by: EMERGENCY MEDICINE

## 2018-06-21 PROCEDURE — 99284 EMERGENCY DEPT VISIT MOD MDM: CPT

## 2018-06-21 PROCEDURE — 85025 COMPLETE CBC W/AUTO DIFF WBC: CPT | Performed by: PHYSICIAN ASSISTANT

## 2018-06-21 PROCEDURE — 83605 ASSAY OF LACTIC ACID: CPT | Performed by: PHYSICIAN ASSISTANT

## 2018-06-21 PROCEDURE — 76830 TRANSVAGINAL US NON-OB: CPT

## 2018-06-21 PROCEDURE — 96374 THER/PROPH/DIAG INJ IV PUSH: CPT

## 2018-06-21 PROCEDURE — 87491 CHLMYD TRACH DNA AMP PROBE: CPT | Performed by: EMERGENCY MEDICINE

## 2018-06-21 PROCEDURE — 87661 TRICHOMONAS VAGINALIS AMPLIF: CPT | Performed by: EMERGENCY MEDICINE

## 2018-06-21 RX ORDER — ONDANSETRON 2 MG/ML
4 INJECTION INTRAMUSCULAR; INTRAVENOUS ONCE
Status: COMPLETED | OUTPATIENT
Start: 2018-06-21 | End: 2018-06-21

## 2018-06-21 RX ORDER — DOXYCYCLINE 100 MG/1
100 CAPSULE ORAL 2 TIMES DAILY
Qty: 28 CAPSULE | Refills: 0 | Status: SHIPPED | OUTPATIENT
Start: 2018-06-21 | End: 2018-07-05

## 2018-06-21 RX ORDER — SODIUM CHLORIDE 0.9 % (FLUSH) 0.9 %
10 SYRINGE (ML) INJECTION AS NEEDED
Status: DISCONTINUED | OUTPATIENT
Start: 2018-06-21 | End: 2018-06-21 | Stop reason: HOSPADM

## 2018-06-21 RX ORDER — CEFDINIR 300 MG/1
300 CAPSULE ORAL 2 TIMES DAILY
Qty: 14 CAPSULE | Refills: 0 | Status: SHIPPED | OUTPATIENT
Start: 2018-06-21 | End: 2018-06-21

## 2018-06-21 RX ORDER — TRAMADOL HYDROCHLORIDE 50 MG/1
50 TABLET ORAL EVERY 6 HOURS PRN
Qty: 15 TABLET | Refills: 0 | OUTPATIENT
Start: 2018-06-21 | End: 2018-11-30

## 2018-06-21 RX ORDER — NAPROXEN 500 MG/1
500 TABLET ORAL 2 TIMES DAILY WITH MEALS
Qty: 10 TABLET | Refills: 0 | Status: SHIPPED | OUTPATIENT
Start: 2018-06-21 | End: 2019-05-01

## 2018-06-21 RX ORDER — ONDANSETRON 4 MG/1
4 TABLET, FILM COATED ORAL EVERY 8 HOURS PRN
Qty: 30 TABLET | Refills: 0 | OUTPATIENT
Start: 2018-06-21 | End: 2018-11-30

## 2018-06-21 RX ORDER — ONDANSETRON 2 MG/ML
4 INJECTION INTRAMUSCULAR; INTRAVENOUS ONCE
Status: DISCONTINUED | OUTPATIENT
Start: 2018-06-21 | End: 2018-06-21

## 2018-06-21 RX ORDER — NAPROXEN 375 MG/1
375 TABLET ORAL 2 TIMES DAILY PRN
Qty: 14 TABLET | Refills: 0 | OUTPATIENT
Start: 2018-06-21 | End: 2018-11-30

## 2018-06-21 RX ADMIN — SODIUM CHLORIDE 1000 ML: 9 INJECTION, SOLUTION INTRAVENOUS at 12:34

## 2018-06-21 RX ADMIN — ONDANSETRON 4 MG: 2 INJECTION INTRAMUSCULAR; INTRAVENOUS at 12:47

## 2018-06-21 RX ADMIN — HYDROMORPHONE HYDROCHLORIDE 0.5 MG: 1 INJECTION, SOLUTION INTRAMUSCULAR; INTRAVENOUS; SUBCUTANEOUS at 14:02

## 2018-06-22 ENCOUNTER — OFFICE VISIT (OUTPATIENT)
Dept: FAMILY MEDICINE CLINIC | Facility: CLINIC | Age: 23
End: 2018-06-22

## 2018-06-22 VITALS
BODY MASS INDEX: 36.4 KG/M2 | HEART RATE: 67 BPM | DIASTOLIC BLOOD PRESSURE: 80 MMHG | SYSTOLIC BLOOD PRESSURE: 124 MMHG | HEIGHT: 71 IN | OXYGEN SATURATION: 98 % | WEIGHT: 260 LBS

## 2018-06-22 DIAGNOSIS — R10.9 ABDOMINAL PAIN, UNSPECIFIED ABDOMINAL LOCATION: Primary | ICD-10-CM

## 2018-06-22 PROCEDURE — 99214 OFFICE O/P EST MOD 30 MIN: CPT | Performed by: PHYSICIAN ASSISTANT

## 2018-06-22 RX ORDER — METRONIDAZOLE 500 MG/1
500 TABLET ORAL 2 TIMES DAILY
Qty: 28 TABLET | Refills: 0 | Status: SHIPPED | OUTPATIENT
Start: 2018-06-22 | End: 2018-07-07

## 2018-06-22 NOTE — PROGRESS NOTES
Chief Complaint   Patient presents with   • Establish Care     ER follow up        HPI     Ammon Vizcarra is a pleasant 23 y.o. female with a PMH of fibromyalgia, complex regional pain syndrome, constipation, POTS, and MRSA cellulitis requiring ID management by Dr. Archuleta who is here for ER follow-up. I recall seeing her one time at Dr. Aleman's office. She plans to establish care here with Dr. Aguilera but could not get an appointment until August. Accompanied by mother and  today. Was recently evaluated at [facility] ER on 6/20 and 6/21 for left-sided abdominal pain and was diagnosed with PID. Received IV rocephin at ER on 6/20 and discharged on cefdinir for possible UTI which was changed to doxy on 6/21 ER visit. Her GC/Chlamydia testing is still pending. CT abd/pelvis and t/v ultrasound were relatively unremarkable aside from some cul-de-sac fluid, likely physiologic. A ruptured ovarian cyst was also suggested as a possible diagnosis. She states her left-sided abdominal pain has not improved. She reports constant dull pain with sharp pains lasting a few seconds intermittently. Worse with movement but felt better lying on her left side last night. Naproxen 500mg bid and tramadol x 2 doses did not significantly help. She rates her pain 8/10 today. Having 2 normal BMs per day. Denies urinary complaints. Still having heavy vaginal discharge. No bleeding. She was recently  3 weeks ago and per her mother had not been sexually active prior to then. Negative pregnancy test in ER. Uses [facility] and sees Dr. Call for her GYN care. Had routine GYN exam a couple of months ago without concerns.       Currently sees neuromuscular clinic at  for fibromyalgia and complex regional pain syndrome. She used to take hydrocodone regularly for 5 years but no longer takes it.    Past Medical History:   Diagnosis Date   • Migraine    • MRSA (methicillin resistant Staphylococcus aureus)     Frequent MRSA skin infections, active since  age 5.       Past Surgical History:   Procedure Laterality Date   • CHOLECYSTECTOMY     • NOSE SURGERY     • TONSILLECTOMY AND ADENOIDECTOMY         Family History   Problem Relation Age of Onset   • Diabetes Mother    • Cancer Maternal Grandmother    • Diabetes Maternal Grandmother    • Kidney disease Maternal Grandmother    • Stroke Maternal Grandmother    • Cancer Maternal Grandfather    • Heart attack Maternal Grandfather    • Hearing loss Maternal Grandfather    • Stroke Maternal Grandfather    • Cancer Paternal Grandfather    • Hearing loss Paternal Grandfather    • Heart attack Paternal Grandfather        Social History     Social History   • Marital status: Single     Spouse name: N/A   • Number of children: N/A   • Years of education: N/A     Occupational History   • Not on file.     Social History Main Topics   • Smoking status: Never Smoker   • Smokeless tobacco: Never Used   • Alcohol use No   • Drug use: No   • Sexual activity: Defer     Other Topics Concern   • Not on file     Social History Narrative   • No narrative on file       Allergies   Allergen Reactions   • Eggs Or Egg-Derived Products Hives, Swelling and Angioedema   • Rizatriptan Rash       ROS    Review of Systems   Constitutional: Negative for fever and unexpected weight loss.   HENT: Negative for congestion, rhinorrhea, sneezing and sore throat.    Respiratory: Negative for cough and shortness of breath.    Cardiovascular: Negative for chest pain.   Gastrointestinal: Positive for constipation and nausea. Negative for blood in stool, diarrhea, vomiting and GERD.   Genitourinary: Positive for vaginal discharge. Negative for dysuria, frequency, hematuria, urgency and vaginal bleeding.   Musculoskeletal: Positive for arthralgias and myalgias.   Hematological: Negative for adenopathy.       Vitals:    06/22/18 0804   BP: 124/80   Pulse: 67   SpO2: 98%         Current Outpatient Prescriptions:   •  albuterol (PROAIR HFA) 108 (90 BASE) MCG/ACT  inhaler, Daily., Disp: , Rfl:   •  Ascorbic Acid (VITAMIN C PO), Take  by mouth 2 (Two) Times a Day., Disp: , Rfl:   •  bisoprolol (ZEBeta) 5 MG tablet, Take 1 tablet by mouth 2 (Two) Times a Day., Disp: 60 tablet, Rfl: 11  •  Cetirizine HCl (ZYRTEC ALLERGY PO), Take  by mouth Daily., Disp: , Rfl:   •  Cholecalciferol (VITAMIN D PO), Take  by mouth 2 (Two) Times a Day., Disp: , Rfl:   •  citalopram (CeleXA) 40 MG tablet, Take 1 tablet by mouth Daily., Disp: 30 tablet, Rfl: 11  •  doxycycline (MONODOX) 100 MG capsule, Take 1 capsule by mouth 2 (Two) Times a Day for 14 days., Disp: 28 capsule, Rfl: 0  •  droxidopa (NORTHERA) 300 MG capsule capsule, Take 1 capsule by mouth 3 (Three) Times a Day., Disp: 90 capsule, Rfl: 9  •  eletriptan (RELPAX) 40 MG tablet, Take 1 tablet by mouth on onset of migraine. May repeat in 2 hours. Max of 2 tabs in 24 hours and 3 days a week., Disp: 6 tablet, Rfl: 2  •  EPINEPHrine (EPIPEN IJ), Inject  as directed As Needed., Disp: , Rfl:   •  fludrocortisone 0.1 MG tablet, Take 1 tablet by mouth every morning, Disp: 30 tablet, Rfl: 11  •  MAGNESIUM PO, Take  by mouth Every Night., Disp: , Rfl:   •  midodrine (PROAMATINE) 5 MG tablet, Take  by mouth Daily As Needed., Disp: , Rfl:   •  naproxen (NAPROSYN) 500 MG tablet, Take 1 tablet by mouth 2 (Two) Times a Day With Meals., Disp: 10 tablet, Rfl: 0  •  naproxen sodium (ALEVE) 220 MG tablet, Take  by mouth As Needed., Disp: , Rfl:   •  norgestimate-ethinyl estradiol (ORTHO-CYCLEN) 0.25-35 MG-MCG per tablet, Take 1 tablet by mouth Daily., Disp: 28 tablet, Rfl: 11  •  NORTHERA 200 MG capsule, TAKE 1 CAPSULE BY MOUTH THREE TIMES DAILY, LAST DOSE NOT TO BE TAKEN AFTER 5 PM (TAKE WITH ONE 300MG CAPSULE FOR A TOTAL DOSE  MG), Disp: 90 capsule, Rfl: PRN  •  ondansetron (ZOFRAN) 4 MG tablet, Take  by mouth As Needed., Disp: , Rfl:   •  promethazine (PHENERGAN) 25 MG tablet, Take  by mouth As Needed., Disp: , Rfl:   •  promethazine (PROMETHEGAN)  25 MG suppository, As Needed., Disp: , Rfl:   •  raNITIdine (ZANTAC) 300 MG tablet, Take 1 tablet by mouth 2 (Two) Times a Day., Disp: 60 tablet, Rfl: 1  •  traMADol (ULTRAM) 50 MG tablet, Take 1 tablet by mouth Every 6 (Six) Hours As Needed for Severe Pain ., Disp: 15 tablet, Rfl: 0  •  benzonatate (TESSALON) 100 MG capsule, Take 1-2 capsules by mouth 3 (Three) Times a Day as needed for cough, Disp: 30 capsule, Rfl: 0  •  Hydrocodone-Acetaminophen (LORTAB PO), Take 5 mg by mouth Daily., Disp: , Rfl:   •  Levonorgest-Eth Estrad 91-Day (SEASONIQUE) 0.15-0.03 &0.01 MG tablet, Take  by mouth Daily., Disp: , Rfl:   •  naproxen (NAPROSYN) 375 MG tablet, Take 1 tablet by mouth 2 (Two) Times a Day As Needed for Moderate Pain ., Disp: 14 tablet, Rfl: 0  •  ondansetron (ZOFRAN) 4 MG tablet, Take 1 tablet by mouth Every 8 (Eight) Hours As Needed for Nausea or Vomiting., Disp: 30 tablet, Rfl: 0  •  ranitidine (ZANTAC) 75 MG tablet, Take 75 mg by mouth 2 (Two) Times a Day., Disp: , Rfl:   •  topiramate (TOPAMAX) 100 MG tablet, Take 1 tablet by mouth 2 (Two) Times a Day., Disp: 180 tablet, Rfl: 2  •  traZODone (DESYREL) 100 MG tablet, Take 100 mg by mouth Every Night., Disp: , Rfl:   No current facility-administered medications for this visit.     PE    Physical Exam   Constitutional: She appears well-developed and well-nourished.  Non-toxic appearance. No distress.   Obese   HENT:   Head: Normocephalic.   Cardiovascular: Normal rate, regular rhythm and normal heart sounds.    No murmur heard.  Pulmonary/Chest: Effort normal and breath sounds normal. She has no wheezes. She has no rales.   Abdominal: Soft. Bowel sounds are normal. She exhibits no distension. There is tenderness (TTP LUQ and LLQ. No rebound or guarding. + Carnett's sign).   Neurological: She is alert.   Psychiatric: She has a normal mood and affect. Her behavior is normal.       Results    Results for orders placed or performed during the hospital encounter of  06/21/18   Comprehensive Metabolic Panel   Result Value Ref Range    Glucose 81 70 - 100 mg/dL    BUN 8 (L) 9 - 23 mg/dL    Creatinine 0.62 0.60 - 1.30 mg/dL    Sodium 138 132 - 146 mmol/L    Potassium 4.2 3.5 - 5.5 mmol/L    Chloride 106 99 - 109 mmol/L    CO2 25.0 20.0 - 31.0 mmol/L    Calcium 9.0 8.7 - 10.4 mg/dL    Total Protein 7.0 5.7 - 8.2 g/dL    Albumin 4.01 3.20 - 4.80 g/dL    ALT (SGPT) 17 7 - 40 U/L    AST (SGOT) 19 0 - 33 U/L    Alkaline Phosphatase 114 (H) 25 - 100 U/L    Total Bilirubin 0.5 0.3 - 1.2 mg/dL    eGFR Non African Amer 119 >60 mL/min/1.73    Globulin 3.0 gm/dL    A/G Ratio 1.3 (L) 1.5 - 2.5 g/dL    BUN/Creatinine Ratio 12.9 7.0 - 25.0    Anion Gap 7.0 3.0 - 11.0 mmol/L   Lipase   Result Value Ref Range    Lipase 25 6 - 51 U/L   Urinalysis With / Microscopic If Indicated (No Culture) - Urine, Clean Catch   Result Value Ref Range    Color, UA Yellow Yellow, Straw    Appearance, UA Cloudy (A) Clear    pH, UA 5.5 5.0 - 8.0    Specific Gravity, UA 1.027 1.001 - 1.030    Glucose, UA Negative Negative    Ketones, UA Negative Negative    Bilirubin, UA Negative Negative    Blood, UA Negative Negative    Protein, UA Negative Negative    Leuk Esterase, UA Moderate (2+) (A) Negative    Nitrite, UA Negative Negative    Urobilinogen, UA 0.2 E.U./dL 0.2 - 1.0 E.U./dL   Lactic Acid, Plasma   Result Value Ref Range    Lactate 0.7 0.5 - 2.0 mmol/L   CBC Auto Differential   Result Value Ref Range    WBC 9.04 3.50 - 10.80 10*3/mm3    RBC 4.67 3.89 - 5.14 10*6/mm3    Hemoglobin 12.3 11.5 - 15.5 g/dL    Hematocrit 38.8 34.5 - 44.0 %    MCV 83.1 80.0 - 99.0 fL    MCH 26.3 (L) 27.0 - 31.0 pg    MCHC 31.7 (L) 32.0 - 36.0 g/dL    RDW 13.3 11.3 - 14.5 %    RDW-SD 40.1 37.0 - 54.0 fl    MPV 9.7 6.0 - 12.0 fL    Platelets 360 150 - 450 10*3/mm3    Neutrophil % 68.3 41.0 - 71.0 %    Lymphocyte % 21.8 (L) 24.0 - 44.0 %    Monocyte % 6.3 0.0 - 12.0 %    Eosinophil % 3.3 (H) 0.0 - 3.0 %    Basophil % 0.3 0.0 - 1.0 %     Immature Grans % 0.2 0.0 - 0.6 %    Neutrophils, Absolute 6.17 1.50 - 8.30 10*3/mm3    Lymphocytes, Absolute 1.97 0.60 - 4.80 10*3/mm3    Monocytes, Absolute 0.57 0.00 - 1.00 10*3/mm3    Eosinophils, Absolute 0.30 0.00 - 0.30 10*3/mm3    Basophils, Absolute 0.03 0.00 - 0.20 10*3/mm3    Immature Grans, Absolute 0.02 0.00 - 0.03 10*3/mm3    nRBC 0.0 0.0 - 0.0 /100 WBC   Urinalysis, Microscopic Only - Urine, Clean Catch   Result Value Ref Range    RBC, UA 0-2 None Seen, 0-2 /HPF    WBC, UA 31-50 (A) None Seen, 0-2 /HPF    Bacteria, UA 2+ (A) None Seen, Trace /HPF    Squamous Epithelial Cells, UA 13-20 (A) None Seen, 0-2 /HPF    Hyaline Casts, UA 7-12 0 - 6 /LPF    Methodology Automated Microscopy    Light Blue Top   Result Value Ref Range    Extra Tube hold for add-on    Green Top (Gel)   Result Value Ref Range    Extra Tube Hold for add-ons.    Lavender Top   Result Value Ref Range    Extra Tube hold for add-on    Gold Top - SST   Result Value Ref Range    Extra Tube Hold for add-ons.        A/P    Problem List Items Addressed This Visit     Abdominal pain - Primary    Overview     -Initially covered for UTI by ER with cefdinir. Doxy added 6/21 for clinical dx PID.   -No urine culture obtained possibly due to contaminated specimen. No current UTI symptoms.   -Newlywed now with continuing vaginal discharge. -Recommend adding flagyl to doxy given pending STI testing and scheduling follow-up with her OBGYN within 1 week. We will also plan follow-up with her next week to ensure she is improving.  -Possibility of musculoskeletal pain contributing to her symptoms was discussed. Continue naproxen 500mg bid. Discussed she may use 2 pills tramadol TID PRN (has supply remaining from ER) and Tylenol 1g TID PRN.  -Counseled pt/family to call or return to ER with fever, worsening or new, concerning symptoms.                Plan of care reviewed with patient at the conclusion of today's visit. Education was provided regarding  diagnosis, management and any prescribed or recommended OTC medications.  Patient verbalizes understanding of and agreement with management plan.        VERONICA Rosas

## 2018-06-23 LAB
C TRACH RRNA SPEC DONR QL NAA+PROBE: NEGATIVE
GNRH SERPL-MCNC: NEGATIVE
T VAGINALIS RRNA GENITAL QL PROBE: NEGATIVE

## 2018-06-25 NOTE — PROGRESS NOTES
I have reviewed the notes, assessments, and/or procedures performed by VERNOICA Muñoz, I concur with her/his documentation of Ammon Vizcarra.

## 2018-07-26 RX ORDER — ELETRIPTAN HYDROBROMIDE 40 MG/1
40 TABLET, FILM COATED ORAL
Qty: 6 TABLET | Refills: 2 | OUTPATIENT
Start: 2018-07-26 | End: 2018-11-30

## 2018-08-15 ENCOUNTER — OFFICE VISIT (OUTPATIENT)
Dept: CARDIOLOGY | Facility: CLINIC | Age: 23
End: 2018-08-15

## 2018-08-15 VITALS
DIASTOLIC BLOOD PRESSURE: 78 MMHG | WEIGHT: 268 LBS | SYSTOLIC BLOOD PRESSURE: 108 MMHG | BODY MASS INDEX: 37.52 KG/M2 | HEART RATE: 96 BPM | HEIGHT: 71 IN

## 2018-08-15 DIAGNOSIS — R55 VASOVAGAL SYNCOPE: Primary | ICD-10-CM

## 2018-08-15 PROCEDURE — 99213 OFFICE O/P EST LOW 20 MIN: CPT | Performed by: INTERNAL MEDICINE

## 2018-08-15 NOTE — PROGRESS NOTES
Ammon Vizcarra  1995    There is no work phone number on file.    08/15/2018    [facility] CARDIOLOGY     Mari Aguilera, DO  [address] [facility] [address] [place] [address]    Chief Complaint   Patient presents with   • Shortness of Breath         PROBLEM LIST:     1.  Syncope, active September 2014:   a. Tilt table, September 2014, showing  cardioinhibitory and vasodepressor components, symptomatic.    b. Event monitor, 10/27/2014:  Sinus tachycardia up to 180 bpm with less strenuous activities, such as activities of daily living.   c. Echocardiogram, 11/07/2014:  EF 53%, IVS 0.95, LVPW  0.95, mild MR and mild TR.   d. Initiation of midodrine and Celexa, November 2014.    e. Hospitalization 02/12/2015 through 02/15 2015 at [facility] for a syncopal episode with negative EEG and CT scan of the head, possible etiology of myoclonic  jerking caused by baclofen; however, the patient is now back on baclofen.    f. Extensive evaluation at [facility], July 2015 with neural evaluation showing no epileptic seizures, normal tilt table test and normal QSART.  g.  Initiation of Northera, August 2015.   h. Echocardiogram 12/13/17: EF 55%, physiologic tricuspid valve regurgitation is present. Estimated right ventricular systolic pressure from tricuspid regurgitation is normal (<35 mmHg  2. Complex regional pain syndrome  with significant muscle spasm and paresthesias:   a. Followed by Brenton Pineda MD, Sarai Swain, APRN.  3. Frequent MRSA skin infections, active since  age 5.   4. Migraine headaches.  5. Surgical history:   a. Cholecystectomy.   b.  Tonsillectomy and adenoidectomy.  c. Nasal surgery.     Allergies  Allergies   Allergen Reactions   • Eggs Or Egg-Derived Products Hives, Swelling and Angioedema   • Rizatriptan Rash       Current Medications    Current Outpatient Prescriptions:   •  albuterol (PROAIR HFA) 108 (90 BASE) MCG/ACT inhaler,  Daily., Disp: , Rfl:   •  Ascorbic Acid (VITAMIN C PO), Take  by mouth 2 (Two) Times a Day., Disp: , Rfl:   •  benzonatate (TESSALON) 100 MG capsule, Take 1-2 capsules by mouth 3 (Three) Times a Day as needed for cough, Disp: 30 capsule, Rfl: 0  •  bisoprolol (ZEBeta) 5 MG tablet, Take 1 tablet by mouth 2 (Two) Times a Day., Disp: 60 tablet, Rfl: 11  •  Cetirizine HCl (ZYRTEC ALLERGY PO), Take  by mouth Daily., Disp: , Rfl:   •  Cholecalciferol (VITAMIN D PO), Take  by mouth 2 (Two) Times a Day., Disp: , Rfl:   •  citalopram (CeleXA) 40 MG tablet, Take 1 tablet by mouth Daily., Disp: 30 tablet, Rfl: 11  •  droxidopa (NORTHERA) 300 MG capsule capsule, Take 1 capsule by mouth 3 (Three) Times a Day., Disp: 90 capsule, Rfl: 9  •  eletriptan (RELPAX) 40 MG tablet, Take 1 tablet by mouth on onset of migraine. May repeat in 2 hours. Max of 2 tabs in 24 hours and 3 days a week., Disp: 6 tablet, Rfl: 2  •  EPINEPHrine (EPIPEN IJ), Inject  as directed As Needed., Disp: , Rfl:   •  fludrocortisone 0.1 MG tablet, Take 1 tablet by mouth every morning, Disp: 30 tablet, Rfl: 11  •  Hydrocodone-Acetaminophen (LORTAB PO), Take 5 mg by mouth Daily., Disp: , Rfl:   •  Levonorgest-Eth Estrad 91-Day (SEASONIQUE) 0.15-0.03 &0.01 MG tablet, Take  by mouth Daily., Disp: , Rfl:   •  MAGNESIUM PO, Take  by mouth Every Night., Disp: , Rfl:   •  midodrine (PROAMATINE) 5 MG tablet, Take  by mouth Daily As Needed., Disp: , Rfl:   •  naproxen (NAPROSYN) 375 MG tablet, Take 1 tablet by mouth 2 (Two) Times a Day As Needed for Moderate Pain ., Disp: 14 tablet, Rfl: 0  •  naproxen (NAPROSYN) 500 MG tablet, Take 1 tablet by mouth 2 (Two) Times a Day With Meals., Disp: 10 tablet, Rfl: 0  •  naproxen sodium (ALEVE) 220 MG tablet, Take  by mouth As Needed., Disp: , Rfl:   •  norgestimate-ethinyl estradiol (ORTHO-CYCLEN) 0.25-35 MG-MCG per tablet, Take 1 tablet by mouth Daily., Disp: 28 tablet, Rfl: 11  •  NORTHERA 200 MG capsule, TAKE 1 CAPSULE BY MOUTH  "THREE TIMES DAILY, LAST DOSE NOT TO BE TAKEN AFTER 5 PM (TAKE WITH ONE 300MG CAPSULE FOR A TOTAL DOSE  MG), Disp: 90 capsule, Rfl: PRN  •  ondansetron (ZOFRAN) 4 MG tablet, Take  by mouth As Needed., Disp: , Rfl:   •  ondansetron (ZOFRAN) 4 MG tablet, Take 1 tablet by mouth Every 8 (Eight) Hours As Needed for Nausea or Vomiting., Disp: 30 tablet, Rfl: 0  •  promethazine (PHENERGAN) 25 MG tablet, Take  by mouth As Needed., Disp: , Rfl:   •  promethazine (PROMETHEGAN) 25 MG suppository, As Needed., Disp: , Rfl:   •  raNITIdine (ZANTAC) 300 MG tablet, Take 1 tablet by mouth 2 (Two) Times a Day., Disp: 60 tablet, Rfl: 1  •  ranitidine (ZANTAC) 75 MG tablet, Take 75 mg by mouth 2 (Two) Times a Day., Disp: , Rfl:   •  topiramate (TOPAMAX) 100 MG tablet, Take 1 tablet by mouth 2 (Two) Times a Day., Disp: 180 tablet, Rfl: 2  •  traMADol (ULTRAM) 50 MG tablet, Take 1 tablet by mouth Every 6 (Six) Hours As Needed for Severe Pain ., Disp: 15 tablet, Rfl: 0  •  traZODone (DESYREL) 100 MG tablet, Take 100 mg by mouth Every Night., Disp: , Rfl:     History of Present Illness   HPI    Pt presents for follow up of VVS. Since we last saw the pt, pt denies any syncopal episodes, SOB, CP, LH, and dizziness. She continues to have fatigue.  Denies any hospitalizations. Overall feels tired. She brings up today that in the next five years, she wants to have a baby and hopes to get off some medications. She wonders if she can start weaning soon.     ROS:  General:  +fatigue, No weight gain or loss  Cardiovascular:  Denies CP, PND, syncope, near syncope, edema or palpitations.  Pulmonary:  Denies TERAN, cough, or wheezing      Vitals:    08/15/18 1022   BP: 108/78   BP Location: Right arm   Patient Position: Sitting   Pulse: 96   Weight: 122 kg (268 lb)   Height: 180.3 cm (71\")     Body mass index is 37.38 kg/m².  PE:  General: NAD  Neck: no JVD, no carotid bruits, no TM  Heart RRR, NL S1, S2, S4 present, no rubs, murmurs  Lungs: CTA, " no wheezes, rhonchi, or rales  Abd: soft, non-tender, NL BS  Ext: No musculoskeletal deformities, no edema, cyanosis, or clubbing  Psych: normal mood and affect    Diagnostic Data:      Procedures    1. Vasovagal syncope          Plan:   1) Syncope  - Continue florinef, northera, zebeta. Will look fro literature regarding Northera and pregnancy. No pregnancy for now.  - decrease Zebeta to once daily to help with fatigue.         F/up in 6 months    Scribed for Chavez Moran MD by Camille Hopkins PA-C. 8/15/2018  10:24 AM     I, Chavez Moran MD, personally performed the services described in this documentation as scribed by the above named individual in my presence, and it is both accurate and complete.  8/15/2018  10:29 AM

## 2018-10-31 RX ORDER — DROXIDOPA 200 MG/1
200 CAPSULE ORAL 3 TIMES DAILY
Qty: 270 CAPSULE | Refills: 3 | Status: SHIPPED | OUTPATIENT
Start: 2018-10-31 | End: 2019-07-16 | Stop reason: SDUPTHER

## 2018-10-31 RX ORDER — DROXIDOPA 300 MG/1
300 CAPSULE ORAL 3 TIMES DAILY
Qty: 270 CAPSULE | Refills: 3 | Status: SHIPPED | OUTPATIENT
Start: 2018-10-31 | End: 2019-07-16 | Stop reason: SDUPTHER

## 2018-11-06 ENCOUNTER — TELEPHONE (OUTPATIENT)
Dept: CARDIOLOGY | Facility: CLINIC | Age: 23
End: 2018-11-06

## 2018-11-30 ENCOUNTER — TELEPHONE (OUTPATIENT)
Dept: URGENT CARE | Facility: CLINIC | Age: 23
End: 2018-11-30

## 2018-12-21 ENCOUNTER — LAB (OUTPATIENT)
Dept: LAB | Facility: HOSPITAL | Age: 23
End: 2018-12-21

## 2018-12-21 ENCOUNTER — OFFICE VISIT (OUTPATIENT)
Dept: FAMILY MEDICINE CLINIC | Facility: CLINIC | Age: 23
End: 2018-12-21

## 2018-12-21 VITALS
SYSTOLIC BLOOD PRESSURE: 134 MMHG | HEIGHT: 71 IN | OXYGEN SATURATION: 97 % | TEMPERATURE: 98 F | WEIGHT: 277 LBS | DIASTOLIC BLOOD PRESSURE: 80 MMHG | BODY MASS INDEX: 38.78 KG/M2

## 2018-12-21 DIAGNOSIS — R05.9 COUGH: ICD-10-CM

## 2018-12-21 DIAGNOSIS — J45.901 ASTHMA WITH ACUTE EXACERBATION, UNSPECIFIED ASTHMA SEVERITY, UNSPECIFIED WHETHER PERSISTENT: ICD-10-CM

## 2018-12-21 DIAGNOSIS — J30.9 ALLERGIC RHINITIS, UNSPECIFIED SEASONALITY, UNSPECIFIED TRIGGER: ICD-10-CM

## 2018-12-21 DIAGNOSIS — J45.901 ASTHMA WITH ACUTE EXACERBATION, UNSPECIFIED ASTHMA SEVERITY, UNSPECIFIED WHETHER PERSISTENT: Primary | ICD-10-CM

## 2018-12-21 LAB — HCG INTACT+B SERPL-ACNC: <5 MIU/ML

## 2018-12-21 PROCEDURE — 84702 CHORIONIC GONADOTROPIN TEST: CPT | Performed by: PHYSICIAN ASSISTANT

## 2018-12-21 PROCEDURE — 99213 OFFICE O/P EST LOW 20 MIN: CPT | Performed by: PHYSICIAN ASSISTANT

## 2018-12-21 RX ORDER — AMOXICILLIN 875 MG/1
875 TABLET, COATED ORAL EVERY 12 HOURS
Qty: 20 TABLET | Refills: 0 | Status: SHIPPED | OUTPATIENT
Start: 2018-12-21 | End: 2019-01-04

## 2018-12-21 RX ORDER — FLUTICASONE PROPIONATE 50 MCG
2 SPRAY, SUSPENSION (ML) NASAL DAILY
Qty: 16 G | Refills: 3 | Status: SHIPPED | OUTPATIENT
Start: 2018-12-21 | End: 2019-11-26 | Stop reason: SDUPTHER

## 2018-12-21 RX ORDER — BENZONATATE 200 MG/1
200 CAPSULE ORAL 3 TIMES DAILY PRN
Qty: 30 CAPSULE | Refills: 0 | Status: SHIPPED | OUTPATIENT
Start: 2018-12-21 | End: 2019-01-21

## 2018-12-21 NOTE — PROGRESS NOTES
"    Chief Complaint   Patient presents with   • Cough       HPI     Ammon Vizcarra is a pleasant 23 y.o. female who presents for evaluation of \"chief complaint.\" Patient reports 4-week history of nonproductive cough, worse at night. Initially had sore throat which is now resolved. Some posttussive vomiting. Patient was evaluated on 11/30 and 12/10 by [facility] with conservative treatment. Had negative strep/flu testing at that time. Works at a . She states a \"low grade temp\" 2 weeks ago of 100.1 has not returned. She states she has a history of \"slight\" asthma. Used albuterol twice last week with temporary improvement in cough. Has been on advair in the past w/episode bronchitis. LMP 12/4 and states there is chance of pregnancy.      Past Medical History:   Diagnosis Date   • Migraine    • MRSA (methicillin resistant Staphylococcus aureus)     Frequent MRSA skin infections, active since age 5.       Past Surgical History:   Procedure Laterality Date   • CHOLECYSTECTOMY     • NOSE SURGERY     • TONSILLECTOMY AND ADENOIDECTOMY         Family History   Problem Relation Age of Onset   • Diabetes Mother    • Cancer Maternal Grandmother    • Diabetes Maternal Grandmother    • Kidney disease Maternal Grandmother    • Stroke Maternal Grandmother    • Cancer Maternal Grandfather    • Heart attack Maternal Grandfather    • Hearing loss Maternal Grandfather    • Stroke Maternal Grandfather    • Cancer Paternal Grandfather    • Hearing loss Paternal Grandfather    • Heart attack Paternal Grandfather        Social History     Socioeconomic History   • Marital status:      Spouse name: Not on file   • Number of children: Not on file   • Years of education: Not on file   • Highest education level: Not on file   Social Needs   • Financial resource strain: Not on file   • Food insecurity - worry: Not on file   • Food insecurity - inability: Not on file   • Transportation needs - medical: Not on file   • Transportation " needs - non-medical: Not on file   Occupational History   • Not on file   Tobacco Use   • Smoking status: Never Smoker   • Smokeless tobacco: Never Used   Substance and Sexual Activity   • Alcohol use: No   • Drug use: No   • Sexual activity: Defer   Other Topics Concern   • Not on file   Social History Narrative   • Not on file       Allergies   Allergen Reactions   • Eggs Or Egg-Derived Products Hives, Swelling and Angioedema   • Cantaloupe (Diagnostic) Hives   • Mushroom Hives   • Shellfish-Derived Products Hives   • Rizatriptan Rash       ROS    Review of Systems   Constitutional: Negative for chills and fever.   HENT: Positive for rhinorrhea. Negative for ear pain, postnasal drip and sore throat.    Respiratory: Positive for cough, shortness of breath and wheezing (nocturnal).    Cardiovascular: Negative for chest pain.       Vitals:    12/21/18 1200   BP: 134/80   Temp: 98 °F (36.7 °C)   SpO2: 97%         Current Outpatient Medications:   •  albuterol (PROAIR HFA) 108 (90 BASE) MCG/ACT inhaler, Daily., Disp: , Rfl:   •  amoxicillin (AMOXIL) 875 MG tablet, Take 1 tablet by mouth Every 12 (Twelve) Hours for 10 days, Disp: 20 tablet, Rfl: 0  •  Ascorbic Acid (VITAMIN C PO), Take  by mouth 2 (Two) Times a Day., Disp: , Rfl:   •  benzonatate (TESSALON) 200 MG capsule, Take 1 capsule by mouth 3 (Three) Times a Day As Needed for Cough., Disp: 30 capsule, Rfl: 0  •  bisoprolol (ZEBeta) 5 MG tablet, Take 1 tablet by mouth 2 (Two) Times a Day., Disp: 60 tablet, Rfl: 11  •  Cetirizine HCl (ZYRTEC ALLERGY PO), Take  by mouth Daily., Disp: , Rfl:   •  Cholecalciferol (VITAMIN D PO), Take  by mouth 2 (Two) Times a Day., Disp: , Rfl:   •  citalopram (CeleXA) 40 MG tablet, Take 1 tablet by mouth Daily., Disp: 30 tablet, Rfl: 11  •  Droxidopa (NORTHERA) 200 MG capsule, Take 1 capsule by mouth 3 (Three) Times a Day., Disp: 270 capsule, Rfl: 3  •  droxidopa (NORTHERA) 300 MG capsule capsule, Take 1 capsule by mouth 3 (Three)  Times a Day., Disp: 270 capsule, Rfl: 3  •  EPINEPHrine (EPIPEN IJ), Inject  as directed As Needed., Disp: , Rfl:   •  fludrocortisone 0.1 MG tablet, Take 1 tablet by mouth every morning, Disp: 30 tablet, Rfl: 11  •  fluticasone (FLONASE) 50 MCG/ACT nasal spray, Instill 2 sprays into the nostril(s) as directed by provider Daily., Disp: 16 g, Rfl: 3  •  Hydrocodone-Acetaminophen (LORTAB PO), Take 5 mg by mouth As Needed., Disp: , Rfl:   •  ibuprofen (ADVIL,MOTRIN) 600 MG tablet, Take 1 tablet by mouth Every 6 (Six) Hours As Needed with food. (Patient taking differently: Take 600 mg by mouth As Needed.), Disp: 30 tablet, Rfl: 0  •  MAGNESIUM PO, Take  by mouth Every Night., Disp: , Rfl:   •  midodrine (PROAMATINE) 5 MG tablet, Take  by mouth Daily As Needed., Disp: , Rfl:   •  minocycline (MINOCIN,DYNACIN) 100 MG capsule, Take 1 capsule by mouth 2 (Two) Times a Day., Disp: 40 capsule, Rfl: 2  •  naproxen (NAPROSYN) 500 MG tablet, Take 1 tablet by mouth 2 (Two) Times a Day With Meals., Disp: 10 tablet, Rfl: 0  •  naproxen sodium (ALEVE) 220 MG tablet, Take  by mouth As Needed., Disp: , Rfl:   •  ondansetron (ZOFRAN) 4 MG tablet, Take  by mouth As Needed., Disp: , Rfl:   •  promethazine (PHENERGAN) 25 MG tablet, Take  by mouth As Needed., Disp: , Rfl:   •  promethazine (PROMETHEGAN) 25 MG suppository, As Needed., Disp: , Rfl:   •  raNITIdine (ZANTAC) 300 MG tablet, Take 1 tablet by mouth 2 (Two) Times a Day., Disp: 60 tablet, Rfl: 1    PE    Physical Exam   Constitutional: She appears well-developed and well-nourished. No distress.   HENT:   Head: Normocephalic.   Right Ear: Tympanic membrane and ear canal normal. Tympanic membrane is not erythematous.   Left Ear: Tympanic membrane and ear canal normal. Tympanic membrane is not erythematous.   Nose: Mucosal edema (erythema) present. Right sinus exhibits no maxillary sinus tenderness and no frontal sinus tenderness. Left sinus exhibits maxillary sinus tenderness. Left  sinus exhibits no frontal sinus tenderness.   Mouth/Throat: Oropharynx is clear and moist and mucous membranes are normal. No posterior oropharyngeal erythema. No tonsillar exudate.   Eyes: Conjunctivae are normal. Right eye exhibits no discharge. Left eye exhibits no discharge.   Neck: Normal range of motion. Neck supple.   Cardiovascular: Normal rate, regular rhythm and normal heart sounds.   Pulmonary/Chest: Effort normal and breath sounds normal. No respiratory distress. She has no wheezes. She has no rhonchi. She has no rales.   Lymphadenopathy:     She has no cervical adenopathy.        Right: No supraclavicular adenopathy present.        Left: No supraclavicular adenopathy present.   Skin: Skin is warm and dry.   Vitals reviewed.       A/P    Problem List Items Addressed This Visit     None      Visit Diagnoses     Asthma with acute exacerbation, unspecified asthma severity, unspecified whether persistent    -  Primary  -Afebrile, lungs clear  -Suspected asthma and tx amoxicillin, flonase for allergies, tessalon perles, sample Breo inhaler, and symptomatic measures discussed.   -Advised patient to avoid using Breo until she is informed of hcg results later today.     Relevant Orders    hCG, Serum, Qualitative    Cough              Plan of care reviewed with patient at the conclusion of today's visit. Education was provided regarding diagnosis, management and any prescribed or recommended OTC medications.  Patient verbalizes understanding of and agreement with management plan.        VERONICA Rosas

## 2019-01-04 ENCOUNTER — OFFICE VISIT (OUTPATIENT)
Dept: FAMILY MEDICINE CLINIC | Facility: CLINIC | Age: 24
End: 2019-01-04

## 2019-01-04 VITALS
OXYGEN SATURATION: 96 % | TEMPERATURE: 98.4 F | HEART RATE: 98 BPM | DIASTOLIC BLOOD PRESSURE: 76 MMHG | SYSTOLIC BLOOD PRESSURE: 114 MMHG | BODY MASS INDEX: 39.33 KG/M2 | RESPIRATION RATE: 20 BRPM | WEIGHT: 282 LBS

## 2019-01-04 DIAGNOSIS — G90.A POTS (POSTURAL ORTHOSTATIC TACHYCARDIA SYNDROME): ICD-10-CM

## 2019-01-04 DIAGNOSIS — J45.909 ASTHMA, UNSPECIFIED ASTHMA SEVERITY, UNSPECIFIED WHETHER COMPLICATED, UNSPECIFIED WHETHER PERSISTENT: ICD-10-CM

## 2019-01-04 DIAGNOSIS — F41.9 ANXIETY AND DEPRESSION: Primary | ICD-10-CM

## 2019-01-04 DIAGNOSIS — M79.7 FIBROMYALGIA: ICD-10-CM

## 2019-01-04 DIAGNOSIS — Z86.14 HISTORY OF MRSA INFECTION: ICD-10-CM

## 2019-01-04 DIAGNOSIS — F32.A ANXIETY AND DEPRESSION: Primary | ICD-10-CM

## 2019-01-04 PROBLEM — A49.02 MRSA (METHICILLIN RESISTANT STAPHYLOCOCCUS AUREUS): Status: ACTIVE | Noted: 2019-01-04

## 2019-01-04 PROCEDURE — 99214 OFFICE O/P EST MOD 30 MIN: CPT | Performed by: PHYSICIAN ASSISTANT

## 2019-01-04 NOTE — PROGRESS NOTES
"Chief Complaint   Patient presents with   • Establish Care       HPI     Ammon Vizcarra is a pleasant 23 y.o. female who is here for follow-up of her chronic conditions and to establish care. She has felt \"good\" overall. Has a lot of stress with her job and notes increasing anxiety. 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 Was recently  last year and is adjusting to new challenges. Currently trying to conceive and takes a prenatal vitamin. Has \"panic attacks\" when she feels tense, chest tightness, and SOA twice weekly. Used to be on valium which made her tired and drowsy but did improve anxiety. She reports seeing a counselor previously helped significantly. At times feels mildly depressed but feels Celexa is managing this well currently. She denies any SI.     Patient reports she has a lingering cough productive of yellow sputum that is much improved after recent treatment with amoxicillin and Breo inhaler sample. Ran out 2-3 days ago. Previously had PFTs with her allergist confirming asthma. History of chronic year-round allergies. Was supposed to begin allergy injections but has been lost to follow-up. Allergic to cats, mold, tree-pollens.     Pain and muscle spasms have been under control. She continues to follow with the [facility] for complex regional pain syndrome. She has occasional flare-ups with stress for which she uses lortab and aleve or ibuprofen PRN. At one time was on high-dose gabapentin but now off with improvement.     She sees cardiology annually for POTS which has been doing well on Northera. F/u planned in August.     Hx MRSA and staph-intermittent minocycline use.     Past Medical History:   Diagnosis Date   • Migraine    • MRSA (methicillin resistant Staphylococcus aureus)     Frequent MRSA skin infections, active since age 5.       Past Surgical History:   Procedure Laterality Date   • CHOLECYSTECTOMY     • NOSE SURGERY     • TONSILLECTOMY AND ADENOIDECTOMY         Family History   Problem " Relation Age of Onset   • Diabetes Mother    • Cancer Maternal Grandmother    • Diabetes Maternal Grandmother    • Kidney disease Maternal Grandmother    • Stroke Maternal Grandmother    • Cancer Maternal Grandfather    • Heart attack Maternal Grandfather    • Hearing loss Maternal Grandfather    • Stroke Maternal Grandfather    • Cancer Paternal Grandfather    • Hearing loss Paternal Grandfather    • Heart attack Paternal Grandfather        Social History     Socioeconomic History   • Marital status:      Spouse name: Not on file   • Number of children: Not on file   • Years of education: Not on file   • Highest education level: Not on file   Social Needs   • Financial resource strain: Not on file   • Food insecurity - worry: Not on file   • Food insecurity - inability: Not on file   • Transportation needs - medical: Not on file   • Transportation needs - non-medical: Not on file   Occupational History   • Not on file   Tobacco Use   • Smoking status: Never Smoker   • Smokeless tobacco: Never Used   Substance and Sexual Activity   • Alcohol use: No   • Drug use: No   • Sexual activity: Defer   Other Topics Concern   • Not on file   Social History Narrative   • Not on file       Allergies   Allergen Reactions   • Eggs Or Egg-Derived Products Hives, Swelling and Angioedema   • Cantaloupe (Diagnostic) Hives   • Mushroom Hives   • Shellfish-Derived Products Hives   • Rizatriptan Rash       ROS    Review of Systems   Constitutional: Negative for chills and fever.   Respiratory: Positive for cough and shortness of breath (with panic attacks). Negative for wheezing.    Cardiovascular: Negative for chest pain.   Psychiatric/Behavioral: Positive for depressed mood. Negative for suicidal ideas. The patient is nervous/anxious.        Vitals:    01/04/19 0951   BP: 114/76   Pulse: 98   Resp: 20   Temp: 98.4 °F (36.9 °C)   SpO2: 96%         Current Outpatient Medications:   •  albuterol (PROAIR HFA) 108 (90 BASE) MCG/ACT  inhaler, Daily., Disp: , Rfl:   •  Ascorbic Acid (VITAMIN C PO), Take  by mouth 2 (Two) Times a Day., Disp: , Rfl:   •  benzonatate (TESSALON) 200 MG capsule, Take 1 capsule by mouth 3 (Three) Times a Day As Needed for Cough., Disp: 30 capsule, Rfl: 0  •  bisoprolol (ZEBeta) 5 MG tablet, Take 1 tablet by mouth 2 (Two) Times a Day., Disp: 60 tablet, Rfl: 11  •  Cetirizine HCl (ZYRTEC ALLERGY PO), Take  by mouth Daily., Disp: , Rfl:   •  Cholecalciferol (VITAMIN D PO), Take  by mouth 2 (Two) Times a Day., Disp: , Rfl:   •  citalopram (CeleXA) 40 MG tablet, Take 1 tablet by mouth Daily., Disp: 30 tablet, Rfl: 11  •  Droxidopa (NORTHERA) 200 MG capsule, Take 1 capsule by mouth 3 (Three) Times a Day., Disp: 270 capsule, Rfl: 3  •  droxidopa (NORTHERA) 300 MG capsule capsule, Take 1 capsule by mouth 3 (Three) Times a Day., Disp: 270 capsule, Rfl: 3  •  EPINEPHrine (EPIPEN IJ), Inject  as directed As Needed., Disp: , Rfl:   •  fludrocortisone 0.1 MG tablet, Take 1 tablet by mouth every morning, Disp: 30 tablet, Rfl: 11  •  fluticasone (FLONASE) 50 MCG/ACT nasal spray, Instill 2 sprays into the nostril(s) as directed by provider Daily., Disp: 16 g, Rfl: 3  •  Hydrocodone-Acetaminophen (LORTAB PO), Take 5 mg by mouth As Needed., Disp: , Rfl:   •  ibuprofen (ADVIL,MOTRIN) 600 MG tablet, Take 1 tablet by mouth Every 6 (Six) Hours As Needed with food. (Patient taking differently: Take 600 mg by mouth As Needed.), Disp: 30 tablet, Rfl: 0  •  MAGNESIUM PO, Take  by mouth Every Night., Disp: , Rfl:   •  midodrine (PROAMATINE) 5 MG tablet, Take  by mouth Daily As Needed., Disp: , Rfl:   •  minocycline (MINOCIN,DYNACIN) 100 MG capsule, Take 1 capsule by mouth 2 (Two) Times a Day., Disp: 40 capsule, Rfl: 2  •  naproxen (NAPROSYN) 500 MG tablet, Take 1 tablet by mouth 2 (Two) Times a Day With Meals., Disp: 10 tablet, Rfl: 0  •  naproxen sodium (ALEVE) 220 MG tablet, Take  by mouth As Needed., Disp: , Rfl:   •  ondansetron (ZOFRAN) 4  MG tablet, Take  by mouth As Needed., Disp: , Rfl:   •  promethazine (PHENERGAN) 25 MG tablet, Take  by mouth As Needed., Disp: , Rfl:   •  promethazine (PROMETHEGAN) 25 MG suppository, As Needed., Disp: , Rfl:   •  raNITIdine (ZANTAC) 300 MG tablet, Take 1 tablet by mouth 2 (Two) Times a Day., Disp: 60 tablet, Rfl: 1  •  Fluticasone Furoate-Vilanterol (BREO ELLIPTA) 200-25 MCG/INH aerosol powder  inhaler, Inhale 1 puff by mouth Daily., Disp: 60 each, Rfl: 0    PE    Physical Exam   Constitutional: She appears well-developed and well-nourished. No distress.   Obese   HENT:   Head: Normocephalic.   Cardiovascular: Normal rate, regular rhythm and normal heart sounds.   No murmur heard.  Pulmonary/Chest: Effort normal and breath sounds normal. She has no wheezes. She has no rales.   Neurological: She is alert.   Psychiatric: She has a normal mood and affect. Her behavior is normal.   Vitals reviewed.      Results    A/P    Problem List Items Addressed This Visit        Cardiovascular and Mediastinum    POTS (postural orthostatic tachycardia syndrome)  -Follows with Dr. Moran. Doing well with current management       Respiratory    Asthma    Overview     -PFTs performed by allergist  -Recent exacerbation improved. Lungs clear today  -Discussed continuing breo another couple weeks, using mucinex.        Relevant Medications    Fluticasone Furoate-Vilanterol (BREO ELLIPTA) 200-25 MCG/INH aerosol powder  inhaler       Nervous and Auditory    Fibromyalgia  -CRPS - follows with [facility]. Patient cannot recall provider's name today.        Other    History of MRSA infection  -History of severe infections requiring IV abx and ID management. PRN minocycline. Discussed avoiding in pregnancy.       Other Visit Diagnoses     Anxiety and depression    -  Primary  -Increasing anxiety. Discuss options. Patient would like to avoid additional medication at this time and would like to reestablish with counselor. Will  provide names and contact information for self-referral today.           Plan of care reviewed with patient at the conclusion of today's visit. Education was provided regarding diagnosis, management and any prescribed or recommended OTC medications.  Patient verbalizes understanding of and agreement with management plan.        VERONICA Rosas

## 2019-01-07 NOTE — PROGRESS NOTES
I have reviewed the notes, assessments, and/or procedures performed by VERONICA Muñoz, I concur with her/his documentation of Ammon Vizcarra.

## 2019-01-21 ENCOUNTER — APPOINTMENT (OUTPATIENT)
Dept: GENERAL RADIOLOGY | Facility: HOSPITAL | Age: 24
End: 2019-01-21

## 2019-01-21 ENCOUNTER — HOSPITAL ENCOUNTER (EMERGENCY)
Facility: HOSPITAL | Age: 24
Discharge: HOME | End: 2019-01-21
Attending: EMERGENCY MEDICINE | Admitting: EMERGENCY MEDICINE

## 2019-01-21 VITALS
WEIGHT: 270 LBS | HEART RATE: 91 BPM | BODY MASS INDEX: 37.8 KG/M2 | RESPIRATION RATE: 20 BRPM | SYSTOLIC BLOOD PRESSURE: 122 MMHG | OXYGEN SATURATION: 96 % | HEIGHT: 71 IN | TEMPERATURE: 98.8 F | DIASTOLIC BLOOD PRESSURE: 77 MMHG

## 2019-01-21 DIAGNOSIS — J11.1 INFLUENZA: Primary | ICD-10-CM

## 2019-01-21 DIAGNOSIS — E86.0 DEHYDRATION: ICD-10-CM

## 2019-01-21 LAB
ANION GAP SERPL CALCULATED.3IONS-SCNC: 10 MMOL/L (ref 3–11)
B-HCG UR QL: NEGATIVE
BACTERIA UR QL AUTO: ABNORMAL /HPF
BASOPHILS # BLD AUTO: 0.03 10*3/MM3 (ref 0–0.2)
BASOPHILS NFR BLD AUTO: 0.1 % (ref 0–1)
BILIRUB UR QL STRIP: NEGATIVE
BUN BLD-MCNC: 8 MG/DL (ref 9–23)
BUN/CREAT SERPL: 9.5 (ref 7–25)
CALCIUM SPEC-SCNC: 9.4 MG/DL (ref 8.7–10.4)
CHLORIDE SERPL-SCNC: 99 MMOL/L (ref 99–109)
CLARITY UR: CLEAR
CO2 SERPL-SCNC: 25 MMOL/L (ref 20–31)
COLOR UR: YELLOW
CREAT BLD-MCNC: 0.84 MG/DL (ref 0.6–1.3)
DEPRECATED RDW RBC AUTO: 41.6 FL (ref 37–54)
EOSINOPHIL # BLD AUTO: 0.01 10*3/MM3 (ref 0–0.3)
EOSINOPHIL NFR BLD AUTO: 0 % (ref 0–3)
ERYTHROCYTE [DISTWIDTH] IN BLOOD BY AUTOMATED COUNT: 14.1 % (ref 11.3–14.5)
FLUAV AG NPH QL: NEGATIVE
FLUBV AG NPH QL IA: NEGATIVE
GFR SERPL CREATININE-BSD FRML MDRD: 84 ML/MIN/1.73
GLUCOSE BLD-MCNC: 108 MG/DL (ref 70–100)
GLUCOSE UR STRIP-MCNC: NEGATIVE MG/DL
HCT VFR BLD AUTO: 41.1 % (ref 34.5–44)
HGB BLD-MCNC: 13.1 G/DL (ref 11.5–15.5)
HGB UR QL STRIP.AUTO: NEGATIVE
HYALINE CASTS UR QL AUTO: ABNORMAL /LPF
IMM GRANULOCYTES # BLD AUTO: 0.08 10*3/MM3 (ref 0–0.03)
IMM GRANULOCYTES NFR BLD AUTO: 0.4 % (ref 0–0.6)
INTERNAL NEGATIVE CONTROL: NEGATIVE
INTERNAL POSITIVE CONTROL: POSITIVE
KETONES UR QL STRIP: ABNORMAL
LEUKOCYTE ESTERASE UR QL STRIP.AUTO: ABNORMAL
LYMPHOCYTES # BLD AUTO: 1.47 10*3/MM3 (ref 0.6–4.8)
LYMPHOCYTES NFR BLD AUTO: 6.8 % (ref 24–44)
Lab: NORMAL
MCH RBC QN AUTO: 25.9 PG (ref 27–31)
MCHC RBC AUTO-ENTMCNC: 31.9 G/DL (ref 32–36)
MCV RBC AUTO: 81.4 FL (ref 80–99)
MONOCYTES # BLD AUTO: 1.42 10*3/MM3 (ref 0–1)
MONOCYTES NFR BLD AUTO: 6.6 % (ref 0–12)
NEUTROPHILS # BLD AUTO: 18.57 10*3/MM3 (ref 1.5–8.3)
NEUTROPHILS NFR BLD AUTO: 86.5 % (ref 41–71)
NITRITE UR QL STRIP: NEGATIVE
PH UR STRIP.AUTO: 5.5 [PH] (ref 5–8)
PLATELET # BLD AUTO: 352 10*3/MM3 (ref 150–450)
PMV BLD AUTO: 9.6 FL (ref 6–12)
POTASSIUM BLD-SCNC: 3.7 MMOL/L (ref 3.5–5.5)
PROT UR QL STRIP: NEGATIVE
RBC # BLD AUTO: 5.05 10*6/MM3 (ref 3.89–5.14)
RBC # UR: ABNORMAL /HPF
REF LAB TEST METHOD: ABNORMAL
SODIUM BLD-SCNC: 134 MMOL/L (ref 132–146)
SP GR UR STRIP: 1.02 (ref 1–1.03)
SQUAMOUS #/AREA URNS HPF: ABNORMAL /HPF
UROBILINOGEN UR QL STRIP: ABNORMAL
WBC NRBC COR # BLD: 21.5 10*3/MM3 (ref 3.5–10.8)
WBC UR QL AUTO: ABNORMAL /HPF

## 2019-01-21 PROCEDURE — 81025 URINE PREGNANCY TEST: CPT | Performed by: EMERGENCY MEDICINE

## 2019-01-21 PROCEDURE — 81001 URINALYSIS AUTO W/SCOPE: CPT | Performed by: EMERGENCY MEDICINE

## 2019-01-21 PROCEDURE — 80048 BASIC METABOLIC PNL TOTAL CA: CPT | Performed by: EMERGENCY MEDICINE

## 2019-01-21 PROCEDURE — 96375 TX/PRO/DX INJ NEW DRUG ADDON: CPT

## 2019-01-21 PROCEDURE — 99284 EMERGENCY DEPT VISIT MOD MDM: CPT

## 2019-01-21 PROCEDURE — 96374 THER/PROPH/DIAG INJ IV PUSH: CPT

## 2019-01-21 PROCEDURE — 25010000002 KETOROLAC TROMETHAMINE PER 15 MG: Performed by: EMERGENCY MEDICINE

## 2019-01-21 PROCEDURE — 85025 COMPLETE CBC W/AUTO DIFF WBC: CPT | Performed by: EMERGENCY MEDICINE

## 2019-01-21 PROCEDURE — 71045 X-RAY EXAM CHEST 1 VIEW: CPT

## 2019-01-21 PROCEDURE — 87804 INFLUENZA ASSAY W/OPTIC: CPT | Performed by: EMERGENCY MEDICINE

## 2019-01-21 PROCEDURE — 96361 HYDRATE IV INFUSION ADD-ON: CPT

## 2019-01-21 PROCEDURE — 25010000002 PROMETHAZINE PER 50 MG: Performed by: EMERGENCY MEDICINE

## 2019-01-21 RX ORDER — PROMETHAZINE HYDROCHLORIDE 25 MG/ML
12.5 INJECTION, SOLUTION INTRAMUSCULAR; INTRAVENOUS ONCE
Status: COMPLETED | OUTPATIENT
Start: 2019-01-21 | End: 2019-01-21

## 2019-01-21 RX ORDER — PROMETHAZINE HYDROCHLORIDE 25 MG/1
25 TABLET ORAL EVERY 6 HOURS PRN
Qty: 15 TABLET | Refills: 0 | OUTPATIENT
Start: 2019-01-21 | End: 2019-04-17 | Stop reason: HOSPADM

## 2019-01-21 RX ORDER — ONDANSETRON 4 MG/1
4 TABLET, ORALLY DISINTEGRATING ORAL EVERY 6 HOURS PRN
Qty: 15 TABLET | Refills: 0 | OUTPATIENT
Start: 2019-01-21 | End: 2019-04-17 | Stop reason: HOSPADM

## 2019-01-21 RX ORDER — KETOROLAC TROMETHAMINE 15 MG/ML
15 INJECTION, SOLUTION INTRAMUSCULAR; INTRAVENOUS ONCE
Status: COMPLETED | OUTPATIENT
Start: 2019-01-21 | End: 2019-01-21

## 2019-01-21 RX ADMIN — PROMETHAZINE HYDROCHLORIDE 12.5 MG: 25 INJECTION INTRAMUSCULAR; INTRAVENOUS at 14:39

## 2019-01-21 RX ADMIN — KETOROLAC TROMETHAMINE 15 MG: 15 INJECTION, SOLUTION INTRAMUSCULAR; INTRAVENOUS at 14:42

## 2019-01-21 RX ADMIN — SODIUM CHLORIDE 2000 ML: 9 INJECTION, SOLUTION INTRAVENOUS at 14:38

## 2019-01-21 NOTE — ED PROVIDER NOTES
Subjective   Ms. Ammon Vargas is a 23-year-old female presenting to the emergency department with complaints of flu-like symptoms. The patient states that yesterday morning she began experiencing multiple symptoms, including subjective fevers, chills, diaphoresis, generalized myalgias, fatigue, sore throat, ear pain, nausea, and vomiting. She states that she did not receive her influenza vaccination this winter due to an egg allergy. She has a history of CRPS and POTS. She denies any recent travel or sick contacts. There are no other acute complaints at this time.        History provided by:  Patient  Flu Symptoms   Presenting symptoms: fatigue, fever (Subjective), myalgias, nausea, sore throat and vomiting    Severity:  Moderate  Onset quality:  Sudden  Duration:  1 day  Progression:  Worsening  Chronicity:  New  Relieved by:  None tried  Worsened by:  Nothing  Ineffective treatments:  None tried  Associated symptoms: chills and ear pain    Risk factors: immunocompromised state    Risk factors: no sick contacts        Review of Systems   Constitutional: Positive for chills, diaphoresis, fatigue and fever (Subjective).   HENT: Positive for ear pain and sore throat.    Respiratory: Negative.    Cardiovascular: Negative.    Gastrointestinal: Positive for nausea and vomiting.   Genitourinary: Negative.    Musculoskeletal: Positive for myalgias.   Skin: Negative.    Allergic/Immunologic: Positive for immunocompromised state.   Neurological: Negative.    Psychiatric/Behavioral: Negative.    All other systems reviewed and are negative.      Past Medical History:   Diagnosis Date   • Migraine        Allergies   Allergen Reactions   • Eggs Or Egg-Derived Products Hives, Swelling and Angioedema   • Cantaloupe (Diagnostic) Hives   • Mushroom Hives   • Shellfish-Derived Products Hives   • Rizatriptan Rash       Past Surgical History:   Procedure Laterality Date   • CHOLECYSTECTOMY     • NOSE SURGERY     • TONSILLECTOMY AND  ADENOIDECTOMY         Family History   Problem Relation Age of Onset   • Diabetes Mother    • Cancer Maternal Grandmother    • Diabetes Maternal Grandmother    • Kidney disease Maternal Grandmother    • Stroke Maternal Grandmother    • Cancer Maternal Grandfather    • Heart attack Maternal Grandfather    • Hearing loss Maternal Grandfather    • Stroke Maternal Grandfather    • Cancer Paternal Grandfather    • Hearing loss Paternal Grandfather    • Heart attack Paternal Grandfather        Social History     Socioeconomic History   • Marital status:      Spouse name: Not on file   • Number of children: Not on file   • Years of education: Not on file   • Highest education level: Not on file   Tobacco Use   • Smoking status: Never Smoker   • Smokeless tobacco: Never Used   Substance and Sexual Activity   • Alcohol use: No   • Drug use: No   • Sexual activity: Defer         Objective   Physical Exam   Constitutional: She is oriented to person, place, and time. She appears well-developed and well-nourished. No distress.   HENT:   Head: Normocephalic and atraumatic.   Nose: Nose normal.   Eyes: Conjunctivae are normal. No scleral icterus.   Neck: Normal range of motion. Neck supple.   Cardiovascular: Regular rhythm and normal heart sounds. Tachycardia present.   No murmur heard.  Pulmonary/Chest: Effort normal and breath sounds normal. No respiratory distress.   Abdominal: Soft. Bowel sounds are normal. There is no tenderness.   Musculoskeletal: Normal range of motion.   Neurological: She is alert and oriented to person, place, and time.   Skin: Skin is warm and dry.   Psychiatric: She has a normal mood and affect. Her behavior is normal.   Nursing note and vitals reviewed.      Procedures         ED Course     Flu negative but high pretest probability, I suspect false negative.  Labs benign.  CXR negative.  Pt feels much better with meds and fluids.  Patient stable on serial rechecks.  Discussed findings, concerns,  plan of care, expected course, reasons to return and followup.                  MDM  Number of Diagnoses or Management Options  Dehydration:   Influenza:      Amount and/or Complexity of Data Reviewed  Clinical lab tests: reviewed and ordered  Tests in the radiology section of CPT®: reviewed and ordered  Independent visualization of images, tracings, or specimens: yes        Final diagnoses:   Influenza   Dehydration       Documentation assistance provided by nirav Dukes.  Information recorded by the scribe was done at my direction and has been verified and validated by me.     Soren Dukes  01/21/19 2182       Pradeep Blair MD  01/21/19 5259

## 2019-03-17 ENCOUNTER — HOSPITAL ENCOUNTER (EMERGENCY)
Facility: HOSPITAL | Age: 24
Discharge: HOME | End: 2019-03-17
Attending: EMERGENCY MEDICINE | Admitting: EMERGENCY MEDICINE

## 2019-03-17 VITALS
RESPIRATION RATE: 16 BRPM | WEIGHT: 260 LBS | BODY MASS INDEX: 36.4 KG/M2 | SYSTOLIC BLOOD PRESSURE: 112 MMHG | HEART RATE: 82 BPM | HEIGHT: 71 IN | TEMPERATURE: 98.2 F | DIASTOLIC BLOOD PRESSURE: 56 MMHG | OXYGEN SATURATION: 98 %

## 2019-03-17 DIAGNOSIS — R55 SYNCOPE, UNSPECIFIED SYNCOPE TYPE: Primary | ICD-10-CM

## 2019-03-17 DIAGNOSIS — R11.2 NON-INTRACTABLE VOMITING WITH NAUSEA, UNSPECIFIED VOMITING TYPE: ICD-10-CM

## 2019-03-17 LAB
ALBUMIN SERPL-MCNC: 4.28 G/DL (ref 3.2–4.8)
ALBUMIN/GLOB SERPL: 1.4 G/DL (ref 1.5–2.5)
ALP SERPL-CCNC: 104 U/L (ref 25–100)
ALT SERPL W P-5'-P-CCNC: 14 U/L (ref 7–40)
ANION GAP SERPL CALCULATED.3IONS-SCNC: 6 MMOL/L (ref 3–11)
AST SERPL-CCNC: 29 U/L (ref 0–33)
B-HCG UR QL: POSITIVE
BASOPHILS # BLD AUTO: 0.03 10*3/MM3 (ref 0–0.2)
BASOPHILS NFR BLD AUTO: 0.3 % (ref 0–1)
BILIRUB SERPL-MCNC: 0.3 MG/DL (ref 0.3–1.2)
BUN BLD-MCNC: 6 MG/DL (ref 9–23)
BUN/CREAT SERPL: 9.5 (ref 7–25)
CALCIUM SPEC-SCNC: 9.1 MG/DL (ref 8.7–10.4)
CHLORIDE SERPL-SCNC: 104 MMOL/L (ref 99–109)
CO2 SERPL-SCNC: 24 MMOL/L (ref 20–31)
CREAT BLD-MCNC: 0.63 MG/DL (ref 0.6–1.3)
DEPRECATED RDW RBC AUTO: 42.8 FL (ref 37–54)
EOSINOPHIL # BLD AUTO: 0.24 10*3/MM3 (ref 0–0.3)
EOSINOPHIL NFR BLD AUTO: 2.2 % (ref 0–3)
ERYTHROCYTE [DISTWIDTH] IN BLOOD BY AUTOMATED COUNT: 14.6 % (ref 11.3–14.5)
GFR SERPL CREATININE-BSD FRML MDRD: 117 ML/MIN/1.73
GLOBULIN UR ELPH-MCNC: 3 GM/DL
GLUCOSE BLD-MCNC: 85 MG/DL (ref 70–100)
GLUCOSE BLDC GLUCOMTR-MCNC: 111 MG/DL (ref 70–130)
GLUCOSE BLDC GLUCOMTR-MCNC: 111 MG/DL (ref 70–130)
HCT VFR BLD AUTO: 37.3 % (ref 34.5–44)
HGB BLD-MCNC: 11.7 G/DL (ref 11.5–15.5)
HOLD SPECIMEN: NORMAL
HOLD SPECIMEN: NORMAL
IMM GRANULOCYTES # BLD AUTO: 0.02 10*3/MM3 (ref 0–0.05)
IMM GRANULOCYTES NFR BLD AUTO: 0.2 % (ref 0–0.6)
INTERNAL NEGATIVE CONTROL: NEGATIVE
INTERNAL POSITIVE CONTROL: POSITIVE
LYMPHOCYTES # BLD AUTO: 3.18 10*3/MM3 (ref 0.6–4.8)
LYMPHOCYTES NFR BLD AUTO: 29.4 % (ref 24–44)
Lab: ABNORMAL
MCH RBC QN AUTO: 25.4 PG (ref 27–31)
MCHC RBC AUTO-ENTMCNC: 31.4 G/DL (ref 32–36)
MCV RBC AUTO: 80.9 FL (ref 80–99)
MONOCYTES # BLD AUTO: 0.79 10*3/MM3 (ref 0–1)
MONOCYTES NFR BLD AUTO: 7.3 % (ref 0–12)
NEUTROPHILS # BLD AUTO: 6.56 10*3/MM3 (ref 1.5–8.3)
NEUTROPHILS NFR BLD AUTO: 60.8 % (ref 41–71)
PLATELET # BLD AUTO: 391 10*3/MM3 (ref 150–450)
PMV BLD AUTO: 9.3 FL (ref 6–12)
POTASSIUM BLD-SCNC: 4.6 MMOL/L (ref 3.5–5.5)
PROT SERPL-MCNC: 7.3 G/DL (ref 5.7–8.2)
RBC # BLD AUTO: 4.61 10*6/MM3 (ref 3.89–5.14)
SODIUM BLD-SCNC: 134 MMOL/L (ref 132–146)
WBC NRBC COR # BLD: 10.8 10*3/MM3 (ref 3.5–10.8)
WHOLE BLOOD HOLD SPECIMEN: NORMAL

## 2019-03-17 PROCEDURE — 82962 GLUCOSE BLOOD TEST: CPT

## 2019-03-17 PROCEDURE — 99284 EMERGENCY DEPT VISIT MOD MDM: CPT

## 2019-03-17 PROCEDURE — 85025 COMPLETE CBC W/AUTO DIFF WBC: CPT | Performed by: EMERGENCY MEDICINE

## 2019-03-17 PROCEDURE — 93005 ELECTROCARDIOGRAM TRACING: CPT | Performed by: EMERGENCY MEDICINE

## 2019-03-17 PROCEDURE — 81025 URINE PREGNANCY TEST: CPT | Performed by: EMERGENCY MEDICINE

## 2019-03-17 PROCEDURE — 80053 COMPREHEN METABOLIC PANEL: CPT | Performed by: EMERGENCY MEDICINE

## 2019-03-17 RX ORDER — SODIUM CHLORIDE 0.9 % (FLUSH) 0.9 %
10 SYRINGE (ML) INJECTION AS NEEDED
Status: DISCONTINUED | OUTPATIENT
Start: 2019-03-17 | End: 2019-03-17 | Stop reason: HOSPADM

## 2019-03-17 RX ADMIN — SODIUM CHLORIDE 1000 ML: 9 INJECTION, SOLUTION INTRAVENOUS at 03:41

## 2019-03-17 RX ADMIN — SODIUM CHLORIDE 1000 ML: 9 INJECTION, SOLUTION INTRAVENOUS at 05:08

## 2019-03-17 NOTE — ED PROVIDER NOTES
Subjective   Ms. Ammon Vargas is a 23 y.o. female who presents to the ED with complaints of syncope. Patient is 6 weeks gestation, unconfirmed by US. She reports she has a history of syncope and POTS and her baseline syncope episodes are characterized by bilateral lower extremity weakness followed by loss of consciousness for seconds and generalized tremors. She states while at work today she had sudden onset of bilateral lower extremity weakness consistent with her previous syncopal episodes. Immediately following this onset she laid on the ground to prevent injury from expected syncope and shortly following she lost consciousness for approximately 20 seconds. She denies any injury, fatigue, headache, nausea, vomiting, vaginal bleeding, seizure, abdominal pain, and any other acute symptoms at this time. She notes she is treated with Northera and has not missed a dose. Her last syncope episode, prior to today, was in 2019.  She is followed by neurology at  and Dr. Moran, cardiology.     Her last normal menstrual period was the last week of January. A0          History provided by:  Patient  Syncope   Episode history:  Single  Most recent episode:  Today  Duration:  20 seconds  Timing:  Constant  Progression:  Worsening  Chronicity:  New  Relieved by:  None tried  Worsened by:  Nothing  Ineffective treatments:  None tried  Associated symptoms: weakness (bilateral lower extremity weakness, prior to syncope episode )    Associated symptoms: no headaches, no malaise/fatigue, no nausea, no seizures and no vomiting    Risk factors: no seizures        Review of Systems   Constitutional: Negative for fatigue and malaise/fatigue.   Cardiovascular: Positive for syncope.   Gastrointestinal: Negative for abdominal pain, nausea and vomiting.   Genitourinary: Negative for vaginal bleeding and vaginal discharge.   Neurological: Positive for tremors (generalized, during syncope episode ), syncope and weakness  (bilateral lower extremity weakness, prior to syncope episode ). Negative for seizures and headaches.   All other systems reviewed and are negative.      Past Medical History:   Diagnosis Date   • Migraine        Allergies   Allergen Reactions   • Eggs Or Egg-Derived Products Hives, Swelling and Angioedema   • Cantaloupe (Diagnostic) Hives   • Mushroom Hives   • Shellfish-Derived Products Hives   • Rizatriptan Rash       Past Surgical History:   Procedure Laterality Date   • CHOLECYSTECTOMY     • NOSE SURGERY     • TONSILLECTOMY AND ADENOIDECTOMY         Family History   Problem Relation Age of Onset   • Diabetes Mother    • Cancer Maternal Grandmother    • Diabetes Maternal Grandmother    • Kidney disease Maternal Grandmother    • Stroke Maternal Grandmother    • Cancer Maternal Grandfather    • Heart attack Maternal Grandfather    • Hearing loss Maternal Grandfather    • Stroke Maternal Grandfather    • Cancer Paternal Grandfather    • Hearing loss Paternal Grandfather    • Heart attack Paternal Grandfather        Social History     Socioeconomic History   • Marital status:      Spouse name: Not on file   • Number of children: Not on file   • Years of education: Not on file   • Highest education level: Not on file   Tobacco Use   • Smoking status: Never Smoker   • Smokeless tobacco: Never Used   Substance and Sexual Activity   • Alcohol use: No   • Drug use: No   • Sexual activity: Defer         Objective   Physical Exam   Constitutional: She is oriented to person, place, and time. She appears well-developed and well-nourished. No distress.   HENT:   Head: Normocephalic and atraumatic.   Eyes: Conjunctivae are normal. No scleral icterus.   Neck: Normal range of motion. Neck supple.   Cardiovascular: Normal rate, regular rhythm and normal heart sounds. Exam reveals no gallop and no friction rub.   No murmur heard.  Pulmonary/Chest: Effort normal and breath sounds normal. No stridor. No respiratory distress.  "She has no wheezes. She has no rales.   Abdominal: Soft. Bowel sounds are normal. There is no tenderness. There is no rebound and no guarding.   Musculoskeletal: Normal range of motion.   Neurological: She is alert and oriented to person, place, and time.   Skin: Skin is warm and dry.   Psychiatric: She has a normal mood and affect. Her behavior is normal.   Nursing note and vitals reviewed.      Procedures         ED Course      No results found for this or any previous visit (from the past 24 hour(s)).  Note: In addition to lab results from this visit, the labs listed above may include labs taken at another facility or during a different encounter within the last 24 hours. Please correlate lab times with ED admission and discharge times for further clarification of the services performed during this visit.    No orders to display     Vitals:    03/17/19 0309 03/17/19 0430 03/17/19 0432 03/17/19 0530   BP: 125/78 102/53  112/56   BP Location: Left arm      Patient Position: Sitting      Pulse: 92  88 82   Resp: 16      Temp: 98.2 °F (36.8 °C)      TempSrc: Oral      SpO2: 100%  98% 98%   Weight: 118 kg (260 lb)      Height: 180.3 cm (71\")        Medications   sodium chloride 0.9 % bolus 1,000 mL (0 mL Intravenous Stopped 3/17/19 0507)   sodium chloride 0.9 % bolus 1,000 mL (0 mL Intravenous Stopped 3/17/19 0554)     ECG/EMG Results (last 24 hours)     ** No results found for the last 24 hours. **        ECG 12 Lead   Final Result   Test Reason : syncope   Blood Pressure : **/** mmHG   Vent. Rate : 090 BPM     Atrial Rate : 090 BPM      P-R Int : 148 ms          QRS Dur : 082 ms       QT Int : 362 ms       P-R-T Axes : 047 052 036 degrees      QTc Int : 442 ms      Normal sinus rhythm      Confirmed by MD MAREK, YENNIFER ([id number]) on 3/19/2019 8:30:54 AM      Referred By:  MAREK           Confirmed By:YENNIFER JARA MD              Results for CIARAN CHARLTON (MRN [id number]) as of 3/25/2019 02:09   Ref. Range 3/17/2019 " 03:36   Glucose Latest Ref Range: 70 - 100 mg/dL 85   Sodium Latest Ref Range: 132 - 146 mmol/L 134   Potassium Latest Ref Range: 3.5 - 5.5 mmol/L 4.6   CO2 Latest Ref Range: 20.0 - 31.0 mmol/L 24.0   Chloride Latest Ref Range: 99 - 109 mmol/L 104   Anion Gap Latest Ref Range: 3.0 - 11.0 mmol/L 6.0   Creatinine Latest Ref Range: 0.60 - 1.30 mg/dL 0.63   BUN Latest Ref Range: 9 - 23 mg/dL 6 (L)   BUN/Creatinine Ratio Latest Ref Range: 7.0 - 25.0  9.5   Calcium Latest Ref Range: 8.7 - 10.4 mg/dL 9.1   eGFR Non  Am Latest Ref Range: >60 mL/min/1.73 117   Alkaline Phosphatase Latest Ref Range: 25 - 100 U/L 104 (H)   Total Protein Latest Ref Range: 5.7 - 8.2 g/dL 7.3   ALT (SGPT) Latest Ref Range: 7 - 40 U/L 14   AST (SGOT) Latest Ref Range: 0 - 33 U/L 29   Total Bilirubin Latest Ref Range: 0.3 - 1.2 mg/dL 0.3   Albumin Latest Ref Range: 3.20 - 4.80 g/dL 4.28   Globulin Latest Units: gm/dL 3.0   A/G Ratio Latest Ref Range: 1.5 - 2.5 g/dL 1.4 (L)   WBC Latest Ref Range: 3.50 - 10.80 10*3/mm3 10.80   RBC Latest Ref Range: 3.89 - 5.14 10*6/mm3 4.61   Hemoglobin Latest Ref Range: 11.5 - 15.5 g/dL 11.7   Hematocrit Latest Ref Range: 34.5 - 44.0 % 37.3   RDW Latest Ref Range: 11.3 - 14.5 % 14.6 (H)   MCV Latest Ref Range: 80.0 - 99.0 fL 80.9   MCH Latest Ref Range: 27.0 - 31.0 pg 25.4 (L)   MCHC Latest Ref Range: 32.0 - 36.0 g/dL 31.4 (L)   MPV Latest Ref Range: 6.0 - 12.0 fL 9.3   Platelets Latest Ref Range: 150 - 450 10*3/mm3 391   RDW-SD Latest Ref Range: 37.0 - 54.0 fl 42.8   Neutrophil Rel % Latest Ref Range: 41.0 - 71.0 % 60.8   Lymphocyte Rel % Latest Ref Range: 24.0 - 44.0 % 29.4   Monocyte Rel % Latest Ref Range: 0.0 - 12.0 % 7.3   Eosinophil Rel % Latest Ref Range: 0.0 - 3.0 % 2.2   Basophil Rel % Latest Ref Range: 0.0 - 1.0 % 0.3   Immature Granulocyte Rel % Latest Ref Range: 0.0 - 0.6 % 0.2   Neutrophils Absolute Latest Ref Range: 1.50 - 8.30 10*3/mm3 6.56   Lymphocytes Absolute Latest Ref Range: 0.60 - 4.80  10*3/mm3 3.18   Monocytes Absolute Latest Ref Range: 0.00 - 1.00 10*3/mm3 0.79   Eosinophils Absolute Latest Ref Range: 0.00 - 0.30 10*3/mm3 0.24   Basophils Absolute Latest Ref Range: 0.00 - 0.20 10*3/mm3 0.03   Immature Grans, Absolute Latest Ref Range: 0.00 - 0.05 10*3/mm3 0.02                 MDM    Final diagnoses:   Syncope, unspecified syncope type   Non-intractable vomiting with nausea, unspecified vomiting type       Documentation assistance provided by nirav Lazo.  Information recorded by the scribe was done at my direction and has been verified and validated by me.     Bud Lazo  03/17/19 0325       Bud Lazo  03/17/19 0337       John Gaitan DO  03/25/19 0210

## 2019-03-18 PROBLEM — E66.09 CLASS 2 OBESITY DUE TO EXCESS CALORIES IN ADULT: Status: ACTIVE | Noted: 2019-03-18

## 2019-03-18 PROBLEM — E66.812 CLASS 2 OBESITY DUE TO EXCESS CALORIES IN ADULT: Status: ACTIVE | Noted: 2019-03-18

## 2019-03-21 ENCOUNTER — TRANSCRIBE ORDERS (OUTPATIENT)
Dept: LAB | Facility: HOSPITAL | Age: 24
End: 2019-03-21

## 2019-03-21 ENCOUNTER — LAB (OUTPATIENT)
Dept: LAB | Facility: HOSPITAL | Age: 24
End: 2019-03-21

## 2019-03-21 DIAGNOSIS — Z34.01 ENCOUNTER FOR SUPERVISION OF NORMAL FIRST PREGNANCY IN FIRST TRIMESTER: Primary | ICD-10-CM

## 2019-03-21 DIAGNOSIS — Z34.01 ENCOUNTER FOR SUPERVISION OF NORMAL FIRST PREGNANCY IN FIRST TRIMESTER: ICD-10-CM

## 2019-03-21 LAB
ABO GROUP BLD: NORMAL
ALBUMIN SERPL-MCNC: 4.49 G/DL (ref 3.2–4.8)
ALBUMIN/GLOB SERPL: 1.7 G/DL (ref 1.5–2.5)
ALP SERPL-CCNC: 108 U/L (ref 25–100)
ALT SERPL W P-5'-P-CCNC: 14 U/L (ref 7–40)
ANION GAP SERPL CALCULATED.3IONS-SCNC: 10 MMOL/L (ref 3–11)
AST SERPL-CCNC: 14 U/L (ref 0–33)
BASOPHILS # BLD AUTO: 0.02 10*3/MM3 (ref 0–0.2)
BASOPHILS NFR BLD AUTO: 0.2 % (ref 0–1)
BILIRUB SERPL-MCNC: 0.5 MG/DL (ref 0.3–1.2)
BLD GP AB SCN SERPL QL: NEGATIVE
BUN BLD-MCNC: 6 MG/DL (ref 9–23)
BUN/CREAT SERPL: 10.2 (ref 7–25)
CALCIUM SPEC-SCNC: 9.4 MG/DL (ref 8.7–10.4)
CHLORIDE SERPL-SCNC: 103 MMOL/L (ref 99–109)
CO2 SERPL-SCNC: 24 MMOL/L (ref 20–31)
CREAT BLD-MCNC: 0.59 MG/DL (ref 0.6–1.3)
DEPRECATED RDW RBC AUTO: 41.9 FL (ref 37–54)
EOSINOPHIL # BLD AUTO: 0.17 10*3/MM3 (ref 0–0.3)
EOSINOPHIL NFR BLD AUTO: 1.8 % (ref 0–3)
ERYTHROCYTE [DISTWIDTH] IN BLOOD BY AUTOMATED COUNT: 14.4 % (ref 11.3–14.5)
GFR SERPL CREATININE-BSD FRML MDRD: 126 ML/MIN/1.73
GLOBULIN UR ELPH-MCNC: 2.7 GM/DL
GLUCOSE BLD-MCNC: 76 MG/DL (ref 70–100)
HBA1C MFR BLD: 5 % (ref 4.8–5.6)
HBV SURFACE AG SERPL QL IA: NORMAL
HCT VFR BLD AUTO: 38.5 % (ref 34.5–44)
HCV AB SER DONR QL: NORMAL
HGB BLD-MCNC: 12.1 G/DL (ref 11.5–15.5)
HIV1+2 AB SER QL: NORMAL
IMM GRANULOCYTES # BLD AUTO: 0.04 10*3/MM3 (ref 0–0.05)
IMM GRANULOCYTES NFR BLD AUTO: 0.4 % (ref 0–0.6)
LYMPHOCYTES # BLD AUTO: 2.39 10*3/MM3 (ref 0.6–4.8)
LYMPHOCYTES NFR BLD AUTO: 24.6 % (ref 24–44)
MCH RBC QN AUTO: 25 PG (ref 27–31)
MCHC RBC AUTO-ENTMCNC: 31.4 G/DL (ref 32–36)
MCV RBC AUTO: 79.5 FL (ref 80–99)
MONOCYTES # BLD AUTO: 0.5 10*3/MM3 (ref 0–1)
MONOCYTES NFR BLD AUTO: 5.1 % (ref 0–12)
NEUTROPHILS # BLD AUTO: 6.63 10*3/MM3 (ref 1.5–8.3)
NEUTROPHILS NFR BLD AUTO: 68.3 % (ref 41–71)
PLATELET # BLD AUTO: 424 10*3/MM3 (ref 150–450)
PMV BLD AUTO: 9.7 FL (ref 6–12)
POTASSIUM BLD-SCNC: 4.1 MMOL/L (ref 3.5–5.5)
PROT SERPL-MCNC: 7.2 G/DL (ref 5.7–8.2)
RBC # BLD AUTO: 4.84 10*6/MM3 (ref 3.89–5.14)
RH BLD: POSITIVE
RUBV IGG SER QL: ABNORMAL
RUBV IGG SER-ACNC: 7.9 IU/ML
SODIUM BLD-SCNC: 137 MMOL/L (ref 132–146)
TSH SERPL DL<=0.05 MIU/L-ACNC: 1.94 MIU/ML (ref 0.35–5.35)
WBC NRBC COR # BLD: 9.71 10*3/MM3 (ref 3.5–10.8)

## 2019-03-21 PROCEDURE — 36415 COLL VENOUS BLD VENIPUNCTURE: CPT

## 2019-03-21 PROCEDURE — 86803 HEPATITIS C AB TEST: CPT

## 2019-03-21 PROCEDURE — 83036 HEMOGLOBIN GLYCOSYLATED A1C: CPT

## 2019-03-21 PROCEDURE — 80081 OBSTETRIC PANEL INC HIV TSTG: CPT

## 2019-03-21 PROCEDURE — 80053 COMPREHEN METABOLIC PANEL: CPT

## 2019-03-21 PROCEDURE — 84443 ASSAY THYROID STIM HORMONE: CPT

## 2019-03-22 LAB — RPR SER QL: NORMAL

## 2019-04-17 ENCOUNTER — APPOINTMENT (OUTPATIENT)
Dept: ULTRASOUND IMAGING | Facility: HOSPITAL | Age: 24
End: 2019-04-17

## 2019-04-17 ENCOUNTER — HOSPITAL ENCOUNTER (EMERGENCY)
Facility: HOSPITAL | Age: 24
Discharge: HOME | End: 2019-04-17
Attending: EMERGENCY MEDICINE | Admitting: EMERGENCY MEDICINE

## 2019-04-17 VITALS
TEMPERATURE: 98.2 F | HEART RATE: 72 BPM | OXYGEN SATURATION: 98 % | HEIGHT: 71 IN | DIASTOLIC BLOOD PRESSURE: 75 MMHG | RESPIRATION RATE: 18 BRPM | WEIGHT: 270 LBS | SYSTOLIC BLOOD PRESSURE: 126 MMHG | BODY MASS INDEX: 37.8 KG/M2

## 2019-04-17 DIAGNOSIS — O46.90 VAGINAL BLEEDING IN PREGNANCY: Primary | ICD-10-CM

## 2019-04-17 DIAGNOSIS — O46.8X1 SUBCHORIONIC HEMORRHAGE OF PLACENTA IN FIRST TRIMESTER, SINGLE OR UNSPECIFIED FETUS: ICD-10-CM

## 2019-04-17 DIAGNOSIS — O41.8X10 SUBCHORIONIC HEMORRHAGE OF PLACENTA IN FIRST TRIMESTER, SINGLE OR UNSPECIFIED FETUS: ICD-10-CM

## 2019-04-17 LAB
ABO GROUP BLD: NORMAL
ALBUMIN SERPL-MCNC: 4 G/DL (ref 3.5–5.2)
ALBUMIN/GLOB SERPL: 1.3 G/DL
ALP SERPL-CCNC: 97 U/L (ref 39–117)
ALT SERPL W P-5'-P-CCNC: 8 U/L (ref 1–33)
ANION GAP SERPL CALCULATED.3IONS-SCNC: 12 MMOL/L
AST SERPL-CCNC: 11 U/L (ref 1–32)
BASOPHILS # BLD AUTO: 0.02 10*3/MM3 (ref 0–0.2)
BASOPHILS NFR BLD AUTO: 0.2 % (ref 0–1.5)
BILIRUB SERPL-MCNC: 0.3 MG/DL (ref 0.2–1.2)
BILIRUB UR QL STRIP: NEGATIVE
BUN BLD-MCNC: 7 MG/DL (ref 6–20)
BUN/CREAT SERPL: 15.6 (ref 7–25)
CALCIUM SPEC-SCNC: 9.4 MG/DL (ref 8.6–10.5)
CHLORIDE SERPL-SCNC: 101 MMOL/L (ref 98–107)
CLARITY UR: CLEAR
CO2 SERPL-SCNC: 23 MMOL/L (ref 22–29)
COLOR UR: YELLOW
CREAT BLD-MCNC: 0.45 MG/DL (ref 0.57–1)
DEPRECATED RDW RBC AUTO: 41.2 FL (ref 37–54)
EOSINOPHIL # BLD AUTO: 0.18 10*3/MM3 (ref 0–0.4)
EOSINOPHIL NFR BLD AUTO: 1.6 % (ref 0.3–6.2)
ERYTHROCYTE [DISTWIDTH] IN BLOOD BY AUTOMATED COUNT: 14.4 % (ref 12.3–15.4)
GFR SERPL CREATININE-BSD FRML MDRD: >150 ML/MIN/1.73
GLOBULIN UR ELPH-MCNC: 3.2 GM/DL
GLUCOSE BLD-MCNC: 88 MG/DL (ref 65–99)
GLUCOSE UR STRIP-MCNC: NEGATIVE MG/DL
HCG INTACT+B SERPL-ACNC: NORMAL MIU/ML
HCT VFR BLD AUTO: 36.2 % (ref 34–46.6)
HGB BLD-MCNC: 11.6 G/DL (ref 12–15.9)
HGB UR QL STRIP.AUTO: NEGATIVE
IMM GRANULOCYTES # BLD AUTO: 0.03 10*3/MM3 (ref 0–0.05)
IMM GRANULOCYTES NFR BLD AUTO: 0.3 % (ref 0–0.5)
KETONES UR QL STRIP: NEGATIVE
LEUKOCYTE ESTERASE UR QL STRIP.AUTO: NEGATIVE
LYMPHOCYTES # BLD AUTO: 3.03 10*3/MM3 (ref 0.7–3.1)
LYMPHOCYTES NFR BLD AUTO: 27.1 % (ref 19.6–45.3)
MCH RBC QN AUTO: 25.4 PG (ref 26.6–33)
MCHC RBC AUTO-ENTMCNC: 32 G/DL (ref 31.5–35.7)
MCV RBC AUTO: 79.4 FL (ref 79–97)
MONOCYTES # BLD AUTO: 0.82 10*3/MM3 (ref 0.1–0.9)
MONOCYTES NFR BLD AUTO: 7.3 % (ref 5–12)
NEUTROPHILS # BLD AUTO: 7.14 10*3/MM3 (ref 1.4–7)
NEUTROPHILS NFR BLD AUTO: 63.8 % (ref 42.7–76)
NITRITE UR QL STRIP: NEGATIVE
PH UR STRIP.AUTO: 6 [PH] (ref 5–8)
PLATELET # BLD AUTO: 354 10*3/MM3 (ref 140–450)
PMV BLD AUTO: 9.3 FL (ref 6–12)
POTASSIUM BLD-SCNC: 4.3 MMOL/L (ref 3.5–5.2)
PROT SERPL-MCNC: 7.2 G/DL (ref 6–8.5)
PROT UR QL STRIP: NEGATIVE
RBC # BLD AUTO: 4.56 10*6/MM3 (ref 3.77–5.28)
RH BLD: POSITIVE
SODIUM BLD-SCNC: 136 MMOL/L (ref 136–145)
SP GR UR STRIP: 1.01 (ref 1–1.03)
UROBILINOGEN UR QL STRIP: NORMAL
WBC NRBC COR # BLD: 11.19 10*3/MM3 (ref 3.4–10.8)

## 2019-04-17 PROCEDURE — 86900 BLOOD TYPING SEROLOGIC ABO: CPT | Performed by: EMERGENCY MEDICINE

## 2019-04-17 PROCEDURE — 76817 TRANSVAGINAL US OBSTETRIC: CPT

## 2019-04-17 PROCEDURE — 84702 CHORIONIC GONADOTROPIN TEST: CPT | Performed by: EMERGENCY MEDICINE

## 2019-04-17 PROCEDURE — 81003 URINALYSIS AUTO W/O SCOPE: CPT | Performed by: EMERGENCY MEDICINE

## 2019-04-17 PROCEDURE — 80053 COMPREHEN METABOLIC PANEL: CPT | Performed by: EMERGENCY MEDICINE

## 2019-04-17 PROCEDURE — 86901 BLOOD TYPING SEROLOGIC RH(D): CPT | Performed by: EMERGENCY MEDICINE

## 2019-04-17 PROCEDURE — 85025 COMPLETE CBC W/AUTO DIFF WBC: CPT | Performed by: EMERGENCY MEDICINE

## 2019-04-17 PROCEDURE — 99283 EMERGENCY DEPT VISIT LOW MDM: CPT

## 2019-04-17 PROCEDURE — 93005 ELECTROCARDIOGRAM TRACING: CPT | Performed by: EMERGENCY MEDICINE

## 2019-04-29 ENCOUNTER — HOSPITAL ENCOUNTER (EMERGENCY)
Facility: HOSPITAL | Age: 24
Discharge: HOME | End: 2019-04-29
Attending: EMERGENCY MEDICINE | Admitting: EMERGENCY MEDICINE

## 2019-04-29 ENCOUNTER — TELEPHONE (OUTPATIENT)
Dept: CARDIOLOGY | Facility: CLINIC | Age: 24
End: 2019-04-29

## 2019-04-29 VITALS
HEIGHT: 71 IN | RESPIRATION RATE: 18 BRPM | WEIGHT: 270 LBS | BODY MASS INDEX: 37.8 KG/M2 | TEMPERATURE: 98.4 F | SYSTOLIC BLOOD PRESSURE: 99 MMHG | HEART RATE: 76 BPM | DIASTOLIC BLOOD PRESSURE: 62 MMHG | OXYGEN SATURATION: 98 %

## 2019-04-29 DIAGNOSIS — G90.A POTS (POSTURAL ORTHOSTATIC TACHYCARDIA SYNDROME): ICD-10-CM

## 2019-04-29 DIAGNOSIS — Z34.90 INTRAUTERINE PREGNANCY: ICD-10-CM

## 2019-04-29 DIAGNOSIS — R82.71 BACTERIURIA WITH PYURIA: ICD-10-CM

## 2019-04-29 DIAGNOSIS — R82.81 BACTERIURIA WITH PYURIA: ICD-10-CM

## 2019-04-29 DIAGNOSIS — O99.810 HYPERGLYCEMIA DURING PREGNANCY: ICD-10-CM

## 2019-04-29 DIAGNOSIS — R55 SYNCOPE AND COLLAPSE: Primary | ICD-10-CM

## 2019-04-29 LAB
ALBUMIN SERPL-MCNC: 4 G/DL (ref 3.5–5.2)
ALBUMIN/GLOB SERPL: 1.1 G/DL
ALP SERPL-CCNC: 106 U/L (ref 39–117)
ALT SERPL W P-5'-P-CCNC: 11 U/L (ref 1–33)
ANION GAP SERPL CALCULATED.3IONS-SCNC: 14 MMOL/L
AST SERPL-CCNC: 14 U/L (ref 1–32)
BACTERIA UR QL AUTO: ABNORMAL /HPF
BASOPHILS # BLD AUTO: 0.01 10*3/MM3 (ref 0–0.2)
BASOPHILS NFR BLD AUTO: 0.1 % (ref 0–1.5)
BILIRUB SERPL-MCNC: 0.4 MG/DL (ref 0.2–1.2)
BILIRUB UR QL STRIP: ABNORMAL
BUN BLD-MCNC: 4 MG/DL (ref 6–20)
BUN/CREAT SERPL: 6.8 (ref 7–25)
CALCIUM SPEC-SCNC: 9.2 MG/DL (ref 8.6–10.5)
CHLORIDE SERPL-SCNC: 100 MMOL/L (ref 98–107)
CLARITY UR: ABNORMAL
CO2 SERPL-SCNC: 23 MMOL/L (ref 22–29)
COLOR UR: ABNORMAL
CREAT BLD-MCNC: 0.59 MG/DL (ref 0.57–1)
DEPRECATED RDW RBC AUTO: 41.2 FL (ref 37–54)
EOSINOPHIL # BLD AUTO: 0.15 10*3/MM3 (ref 0–0.4)
EOSINOPHIL NFR BLD AUTO: 1.7 % (ref 0.3–6.2)
ERYTHROCYTE [DISTWIDTH] IN BLOOD BY AUTOMATED COUNT: 14.5 % (ref 12.3–15.4)
GFR SERPL CREATININE-BSD FRML MDRD: 126 ML/MIN/1.73
GLOBULIN UR ELPH-MCNC: 3.7 GM/DL
GLUCOSE BLD-MCNC: 160 MG/DL (ref 65–99)
GLUCOSE UR STRIP-MCNC: NEGATIVE MG/DL
HCT VFR BLD AUTO: 37.6 % (ref 34–46.6)
HGB BLD-MCNC: 12.3 G/DL (ref 12–15.9)
HGB UR QL STRIP.AUTO: NEGATIVE
HOLD SPECIMEN: NORMAL
HOLD SPECIMEN: NORMAL
HYALINE CASTS UR QL AUTO: ABNORMAL /LPF
IMM GRANULOCYTES # BLD AUTO: 0.03 10*3/MM3 (ref 0–0.05)
IMM GRANULOCYTES NFR BLD AUTO: 0.3 % (ref 0–0.5)
KETONES UR QL STRIP: ABNORMAL
LEUKOCYTE ESTERASE UR QL STRIP.AUTO: ABNORMAL
LYMPHOCYTES # BLD AUTO: 1.56 10*3/MM3 (ref 0.7–3.1)
LYMPHOCYTES NFR BLD AUTO: 17.5 % (ref 19.6–45.3)
MCH RBC QN AUTO: 25.7 PG (ref 26.6–33)
MCHC RBC AUTO-ENTMCNC: 32.7 G/DL (ref 31.5–35.7)
MCV RBC AUTO: 78.7 FL (ref 79–97)
MONOCYTES # BLD AUTO: 0.45 10*3/MM3 (ref 0.1–0.9)
MONOCYTES NFR BLD AUTO: 5.1 % (ref 5–12)
NEUTROPHILS # BLD AUTO: 6.71 10*3/MM3 (ref 1.7–7)
NEUTROPHILS NFR BLD AUTO: 75.3 % (ref 42.7–76)
NITRITE UR QL STRIP: NEGATIVE
PH UR STRIP.AUTO: 5.5 [PH] (ref 5–8)
PLATELET # BLD AUTO: 346 10*3/MM3 (ref 140–450)
PMV BLD AUTO: 9.2 FL (ref 6–12)
POTASSIUM BLD-SCNC: 3.7 MMOL/L (ref 3.5–5.2)
PROT SERPL-MCNC: 7.7 G/DL (ref 6–8.5)
PROT UR QL STRIP: ABNORMAL
RBC # BLD AUTO: 4.78 10*6/MM3 (ref 3.77–5.28)
RBC # UR: ABNORMAL /HPF
REF LAB TEST METHOD: ABNORMAL
SODIUM BLD-SCNC: 137 MMOL/L (ref 136–145)
SP GR UR STRIP: 1.02 (ref 1–1.03)
SQUAMOUS #/AREA URNS HPF: ABNORMAL /HPF
UROBILINOGEN UR QL STRIP: ABNORMAL
WBC NRBC COR # BLD: 8.91 10*3/MM3 (ref 3.4–10.8)
WBC UR QL AUTO: ABNORMAL /HPF
WHOLE BLOOD HOLD SPECIMEN: NORMAL
WHOLE BLOOD HOLD SPECIMEN: NORMAL

## 2019-04-29 PROCEDURE — 85025 COMPLETE CBC W/AUTO DIFF WBC: CPT | Performed by: EMERGENCY MEDICINE

## 2019-04-29 PROCEDURE — 99284 EMERGENCY DEPT VISIT MOD MDM: CPT

## 2019-04-29 PROCEDURE — 81001 URINALYSIS AUTO W/SCOPE: CPT | Performed by: EMERGENCY MEDICINE

## 2019-04-29 PROCEDURE — 80053 COMPREHEN METABOLIC PANEL: CPT | Performed by: EMERGENCY MEDICINE

## 2019-04-29 RX ORDER — SODIUM CHLORIDE 0.9 % (FLUSH) 0.9 %
10 SYRINGE (ML) INJECTION AS NEEDED
Status: DISCONTINUED | OUTPATIENT
Start: 2019-04-29 | End: 2019-04-29 | Stop reason: HOSPADM

## 2019-04-29 RX ORDER — NITROFURANTOIN 25; 75 MG/1; MG/1
100 CAPSULE ORAL ONCE
Status: COMPLETED | OUTPATIENT
Start: 2019-04-29 | End: 2019-04-29

## 2019-04-29 RX ORDER — NITROFURANTOIN 25; 75 MG/1; MG/1
100 CAPSULE ORAL 2 TIMES DAILY
Qty: 14 CAPSULE | Refills: 0 | Status: SHIPPED | OUTPATIENT
Start: 2019-04-29 | End: 2019-06-20

## 2019-04-29 RX ADMIN — NITROFURANTOIN MONOHYDRATE/MACROCRYSTALLINE 100 MG: 25; 75 CAPSULE ORAL at 15:06

## 2019-04-29 RX ADMIN — SODIUM CHLORIDE, POTASSIUM CHLORIDE, SODIUM LACTATE AND CALCIUM CHLORIDE 2000 ML: 600; 310; 30; 20 INJECTION, SOLUTION INTRAVENOUS at 12:50

## 2019-04-29 NOTE — TELEPHONE ENCOUNTER
Patient found out that she is 13 weeks pregnant and has already had a couple episodes where she has blacked out. She has already been to the ER. She wants to schedule an appointment with Dr. Moran. I left a message for scheduling to work her in on his schedule.

## 2019-05-01 ENCOUNTER — OFFICE VISIT (OUTPATIENT)
Dept: CARDIOLOGY | Facility: CLINIC | Age: 24
End: 2019-05-01

## 2019-05-01 VITALS
DIASTOLIC BLOOD PRESSURE: 66 MMHG | OXYGEN SATURATION: 98 % | SYSTOLIC BLOOD PRESSURE: 94 MMHG | WEIGHT: 279 LBS | BODY MASS INDEX: 39.06 KG/M2 | HEIGHT: 71 IN | HEART RATE: 87 BPM

## 2019-05-01 DIAGNOSIS — G90.A POTS (POSTURAL ORTHOSTATIC TACHYCARDIA SYNDROME): ICD-10-CM

## 2019-05-01 DIAGNOSIS — R55 VASOVAGAL SYNCOPE: Primary | ICD-10-CM

## 2019-05-01 PROCEDURE — 99213 OFFICE O/P EST LOW 20 MIN: CPT | Performed by: INTERNAL MEDICINE

## 2019-05-01 PROCEDURE — 93000 ELECTROCARDIOGRAM COMPLETE: CPT | Performed by: INTERNAL MEDICINE

## 2019-05-01 RX ORDER — FLUDROCORTISONE ACETATE 0.1 MG/1
0.2 TABLET ORAL
Qty: 60 TABLET | Refills: 11 | Status: SHIPPED | OUTPATIENT
Start: 2019-05-01 | End: 2020-05-26 | Stop reason: SDUPTHER

## 2019-05-01 NOTE — PROGRESS NOTES
Ammon Vargas  1995  [phone]      05/01/2019    [facility] CARDIOLOGY     E[place] Pr[place]akar, [place]  [address] [facility] [address]    Chief Complaint   Patient presents with   • Loss of Consciousness   • Rapid Heart Rate     PROBLEM LIST:     1.  Syncope, active September 2014:   a. Tilt table, September 2014, showing  cardioinhibitory and vasodepressor components, symptomatic.    b. Event monitor, 10/27/2014:  Sinus tachycardia up to 180 bpm with less strenuous activities, such as activities of daily living.   c. Echocardiogram, 11/07/2014:  EF 53%, IVS 0.95, LVPW  0.95, mild MR and mild TR.   d. Initiation of midodrine and Celexa, November 2014.    e. Hospitalization 02/12/2015 through 02/15 2015 at [facility] for a syncopal episode with negative EEG and CT scan of the head, possible etiology of myoclonic  jerking caused by baclofen; however, the patient is now back on baclofen.    f. Extensive evaluation at [facility], July 2015 with neural evaluation showing no epileptic seizures, normal tilt table test and normal QSART.  g.  Initiation of Northera, August 2015.   h. Echocardiogram 12/13/17: EF 55%, physiologic tricuspid valve regurgitation is present. Estimated right ventricular systolic pressure from tricuspid regurgitation is normal (<35 mmHg)  2. Complex regional pain syndrome  with significant muscle spasm and paresthesias:   a. Followed by Brenton Pineda MD, Sarai Swain, APRN.  3. Frequent MRSA skin infections, active since  age 5.   4. Migraine headaches.  5. Surgical history:   a. Cholecystectomy.   b.  Tonsillectomy and adenoidectomy.  c. Nasal surgery.        Allergies  Allergies   Allergen Reactions   • Banana Anaphylaxis   • Eggs Or Egg-Derived Products Hives, Swelling and Angioedema   • Cantaloupe (Diagnostic) Hives   • Mushroom Hives   • Shellfish-Derived Products Hives   • Rizatriptan Rash       Current  "Medications    Current Outpatient Medications:   •  albuterol (PROAIR HFA) 108 (90 BASE) MCG/ACT inhaler, Daily., Disp: , Rfl:   •  bisoprolol (ZEBeta) 5 MG tablet, Take 1 tablet by mouth 2 (Two) Times a Day. (Patient taking differently: Take 10 mg by mouth Daily.), Disp: 60 tablet, Rfl: 11  •  Cetirizine HCl (ZYRTEC ALLERGY PO), Take  by mouth Daily., Disp: , Rfl:   •  citalopram (CeleXA) 40 MG tablet, Take 1 tablet by mouth Daily., Disp: 30 tablet, Rfl: 11  •  Droxidopa (NORTHERA) 200 MG capsule, Take 1 capsule by mouth 3 (Three) Times a Day., Disp: 270 capsule, Rfl: 3  •  droxidopa (NORTHERA) 300 MG capsule capsule, Take 1 capsule by mouth 3 (Three) Times a Day., Disp: 270 capsule, Rfl: 3  •  EPINEPHrine (EPIPEN IJ), Inject  as directed As Needed., Disp: , Rfl:   •  fludrocortisone 0.1 MG tablet, Take 1 tablet by mouth every morning, Disp: 30 tablet, Rfl: 11  •  fluticasone (FLONASE) 50 MCG/ACT nasal spray, Instill 2 sprays into the nostril(s) as directed by provider Daily., Disp: 16 g, Rfl: 3  •  nitrofurantoin, macrocrystal-monohydrate, (MACROBID) 100 MG capsule, Take 1 capsule by mouth 2 (Two) Times a Day., Disp: 14 capsule, Rfl: 0    History of Present Illness   HPI    Pt presents for follow up of syncope. Since the pt has seen us, she is now 13 weeks pregnant. Since she hashad three episodes of syncope. Went to ER and discharged after IVF. Last episode was Monday. No SOB, CP, LH, and dizziness. Denies any hospitalizations. Overall feels well. Tolerating medications without difficulty. Has had three ultrasounds with NL baby development    ROS:  General:  + fatigue, + weight gain  Cardiovascular:  Denies CP, PND, syncope, near syncope, edema or palpitations.  Pulmonary:  Denies TERAN, cough, or wheezing    Vitals:    05/01/19 1436   BP: 94/66   BP Location: Right arm   Patient Position: Sitting   Pulse: 87   SpO2: 98%   Weight: 127 kg (279 lb)   Height: 180.3 cm (71\")       PE:  General: NAD  Neck: no JVD, no " carotid bruits, no TM  Heart RRR, NL S1, S2,  no rubs, murmurs  Lungs: CTA, no wheezes, rhonchi, or rales  Abd: soft, non-tender, NL BS  Ext: No musculoskeletal deformities, no edema, cyanosis, or clubbing  Psych: normal mood and affect    Diagnostic Data:    ECG 12 Lead  Date/Time: 5/1/2019 11:25 AM  Performed by: Chavez Moran MD  Authorized by: Chavez Moran MD   Comparison: compared with previous ECG from 4/17/2019  Similar to previous ECG  Rhythm: sinus rhythm  Rate: normal  BPM: 72  QRS axis: normal    Clinical impression: normal ECG            1. Vasovagal syncope    2. POTS (postural orthostatic tachycardia syndrome)        Plan:  1) Syncope: recurrent now that she is pregnant; increase florinef to 0.2 mg po daily. Needs high risk OB/GYN       F/up in 3 months

## 2019-05-16 ENCOUNTER — LAB (OUTPATIENT)
Dept: LAB | Facility: HOSPITAL | Age: 24
End: 2019-05-16

## 2019-05-16 ENCOUNTER — TRANSCRIBE ORDERS (OUTPATIENT)
Dept: LAB | Facility: HOSPITAL | Age: 24
End: 2019-05-16

## 2019-05-16 ENCOUNTER — TRANSCRIBE ORDERS (OUTPATIENT)
Dept: WOMENS IMAGING | Facility: HOSPITAL | Age: 24
End: 2019-05-16

## 2019-05-16 DIAGNOSIS — O99.212 OBESITY AFFECTING PREGNANCY IN SECOND TRIMESTER: ICD-10-CM

## 2019-05-16 DIAGNOSIS — G90.A POTS (POSTURAL ORTHOSTATIC TACHYCARDIA SYNDROME): Primary | ICD-10-CM

## 2019-05-16 DIAGNOSIS — F32.A DEPRESSION AFFECTING PREGNANCY IN SECOND TRIMESTER, ANTEPARTUM: ICD-10-CM

## 2019-05-16 DIAGNOSIS — Z3A.28 28 WEEKS GESTATION OF PREGNANCY: ICD-10-CM

## 2019-05-16 DIAGNOSIS — O99.342 DEPRESSION AFFECTING PREGNANCY IN SECOND TRIMESTER, ANTEPARTUM: ICD-10-CM

## 2019-05-16 DIAGNOSIS — Z3A.28 28 WEEKS GESTATION OF PREGNANCY: Primary | ICD-10-CM

## 2019-05-16 LAB — GLUCOSE 1H P 100 G GLC PO SERPL-MCNC: 112 MG/DL

## 2019-05-16 PROCEDURE — 36415 COLL VENOUS BLD VENIPUNCTURE: CPT

## 2019-05-16 PROCEDURE — 82950 GLUCOSE TEST: CPT

## 2019-05-28 ENCOUNTER — TELEPHONE (OUTPATIENT)
Dept: CARDIOLOGY | Facility: CLINIC | Age: 24
End: 2019-05-28

## 2019-06-14 ENCOUNTER — HOSPITAL ENCOUNTER (OUTPATIENT)
Facility: HOSPITAL | Age: 24
Setting detail: OBSERVATION
Discharge: HOME | End: 2019-06-15
Attending: OBSTETRICS & GYNECOLOGY | Admitting: OBSTETRICS & GYNECOLOGY

## 2019-06-14 PROBLEM — O26.899 PREGNANCY RELATED BILATERAL LOWER ABDOMINAL PAIN, ANTEPARTUM: Status: ACTIVE | Noted: 2019-06-14

## 2019-06-14 PROBLEM — R10.30 PREGNANCY RELATED BILATERAL LOWER ABDOMINAL PAIN, ANTEPARTUM: Status: ACTIVE | Noted: 2019-06-14

## 2019-06-14 LAB
BACTERIA UR QL AUTO: ABNORMAL /HPF
BILIRUB UR QL STRIP: ABNORMAL
CLARITY UR: ABNORMAL
COD CRY URNS QL: ABNORMAL /HPF
COLOR UR: ABNORMAL
GLUCOSE UR STRIP-MCNC: NEGATIVE MG/DL
HGB UR QL STRIP.AUTO: NEGATIVE
HYALINE CASTS UR QL AUTO: ABNORMAL /LPF
KETONES UR QL STRIP: ABNORMAL
LEUKOCYTE ESTERASE UR QL STRIP.AUTO: ABNORMAL
NITRITE UR QL STRIP: NEGATIVE
PH UR STRIP.AUTO: 5.5 [PH] (ref 5–8)
PROT UR QL STRIP: ABNORMAL
RBC # UR: ABNORMAL /HPF
REF LAB TEST METHOD: ABNORMAL
SP GR UR STRIP: 1.04 (ref 1–1.03)
SQUAMOUS #/AREA URNS HPF: ABNORMAL /HPF
UROBILINOGEN UR QL STRIP: ABNORMAL
WBC UR QL AUTO: ABNORMAL /HPF

## 2019-06-14 PROCEDURE — 99219 PR INITIAL OBSERVATION CARE/DAY 50 MINUTES: CPT | Performed by: OBSTETRICS & GYNECOLOGY

## 2019-06-14 PROCEDURE — 81001 URINALYSIS AUTO W/SCOPE: CPT | Performed by: OBSTETRICS & GYNECOLOGY

## 2019-06-14 PROCEDURE — 80053 COMPREHEN METABOLIC PANEL: CPT | Performed by: OBSTETRICS & GYNECOLOGY

## 2019-06-14 PROCEDURE — 96374 THER/PROPH/DIAG INJ IV PUSH: CPT

## 2019-06-14 PROCEDURE — 85027 COMPLETE CBC AUTOMATED: CPT | Performed by: OBSTETRICS & GYNECOLOGY

## 2019-06-14 PROCEDURE — 25010000002 BUTORPHANOL PER 1 MG: Performed by: OBSTETRICS & GYNECOLOGY

## 2019-06-14 PROCEDURE — G0378 HOSPITAL OBSERVATION PER HR: HCPCS

## 2019-06-14 PROCEDURE — 87086 URINE CULTURE/COLONY COUNT: CPT | Performed by: OBSTETRICS & GYNECOLOGY

## 2019-06-14 RX ORDER — LIDOCAINE HYDROCHLORIDE 10 MG/ML
5 INJECTION, SOLUTION EPIDURAL; INFILTRATION; INTRACAUDAL; PERINEURAL AS NEEDED
Status: DISCONTINUED | OUTPATIENT
Start: 2019-06-14 | End: 2019-06-15 | Stop reason: HOSPADM

## 2019-06-14 RX ORDER — SODIUM CHLORIDE 0.9 % (FLUSH) 0.9 %
3 SYRINGE (ML) INJECTION EVERY 12 HOURS SCHEDULED
Status: DISCONTINUED | OUTPATIENT
Start: 2019-06-15 | End: 2019-06-15 | Stop reason: HOSPADM

## 2019-06-14 RX ORDER — SODIUM CHLORIDE 0.9 % (FLUSH) 0.9 %
3-10 SYRINGE (ML) INJECTION AS NEEDED
Status: DISCONTINUED | OUTPATIENT
Start: 2019-06-14 | End: 2019-06-15 | Stop reason: HOSPADM

## 2019-06-14 RX ADMIN — BUTORPHANOL TARTRATE 2 MG: 2 INJECTION, SOLUTION INTRAMUSCULAR; INTRAVENOUS at 23:13

## 2019-06-15 VITALS
SYSTOLIC BLOOD PRESSURE: 102 MMHG | RESPIRATION RATE: 16 BRPM | HEART RATE: 76 BPM | TEMPERATURE: 98.3 F | WEIGHT: 270 LBS | DIASTOLIC BLOOD PRESSURE: 62 MMHG | BODY MASS INDEX: 37.66 KG/M2

## 2019-06-15 LAB
ALBUMIN SERPL-MCNC: 3.3 G/DL (ref 3.5–5.2)
ALBUMIN/GLOB SERPL: 1.2 G/DL
ALP SERPL-CCNC: 97 U/L (ref 39–117)
ALT SERPL W P-5'-P-CCNC: 10 U/L (ref 1–33)
ANION GAP SERPL CALCULATED.3IONS-SCNC: 13 MMOL/L
AST SERPL-CCNC: 12 U/L (ref 1–32)
BILIRUB SERPL-MCNC: 0.2 MG/DL (ref 0.2–1.2)
BUN BLD-MCNC: 4 MG/DL (ref 6–20)
BUN/CREAT SERPL: 10.5 (ref 7–25)
CALCIUM SPEC-SCNC: 8.4 MG/DL (ref 8.6–10.5)
CHLORIDE SERPL-SCNC: 100 MMOL/L (ref 98–107)
CO2 SERPL-SCNC: 22 MMOL/L (ref 22–29)
CREAT BLD-MCNC: 0.38 MG/DL (ref 0.57–1)
DEPRECATED RDW RBC AUTO: 44.8 FL (ref 37–54)
ERYTHROCYTE [DISTWIDTH] IN BLOOD BY AUTOMATED COUNT: 15.1 % (ref 12.3–15.4)
GFR SERPL CREATININE-BSD FRML MDRD: >150 ML/MIN/1.73
GLOBULIN UR ELPH-MCNC: 2.7 GM/DL
GLUCOSE BLD-MCNC: 75 MG/DL (ref 65–99)
HCT VFR BLD AUTO: 32.9 % (ref 34–46.6)
HGB BLD-MCNC: 10.4 G/DL (ref 12–15.9)
MCH RBC QN AUTO: 25.7 PG (ref 26.6–33)
MCHC RBC AUTO-ENTMCNC: 31.6 G/DL (ref 31.5–35.7)
MCV RBC AUTO: 81.2 FL (ref 79–97)
PLATELET # BLD AUTO: 309 10*3/MM3 (ref 140–450)
PMV BLD AUTO: 9.8 FL (ref 6–12)
POTASSIUM BLD-SCNC: 3.7 MMOL/L (ref 3.5–5.2)
PROT SERPL-MCNC: 6 G/DL (ref 6–8.5)
RBC # BLD AUTO: 4.05 10*6/MM3 (ref 3.77–5.28)
SODIUM BLD-SCNC: 135 MMOL/L (ref 136–145)
WBC NRBC COR # BLD: 12.15 10*3/MM3 (ref 3.4–10.8)

## 2019-06-15 PROCEDURE — G0378 HOSPITAL OBSERVATION PER HR: HCPCS

## 2019-06-15 RX ADMIN — SODIUM CHLORIDE 2000 ML: 9 INJECTION, SOLUTION INTRAVENOUS at 00:46

## 2019-06-15 NOTE — H&P
"Ammon Vizcarra  1995  [id number]  [id number]    CC: lower abd pain  HPI:  Patient is 24 y.o. white female   currently at 19w3d presents with c/o acute onset lower abd pain, onset ~2030, intermittent, occurs ~q2\", lasts ~30 seconds.  Pain is crampy, radiates to back, denies assoc N, V, D, vag bleeding, or SROM.  +fetal movement. Pt denies recent intercourse.  BPNC to date.    PMH:  Current meds: Northera (epinephrine) 500mg tid, flutocortisol 10mg bid, bisoprolol (?dose),   Fe, vit  Illnesses: POTS, fibromyalgia, autonomics failure, chron regional pain sydrome, hx MRSA,    migraines  Surgeries: T and A, lap lowell, ear tubes, D and C  Allergies: maxalt- face gets swollen       Shellfish- hives, throat swells       Eggs- hives, throat swells    Past OB History:       Obstetric History       T0      L0     SAB0   TAB0   Ectopic1   Molar0   Multiple0   Live Births0       # Outcome Date GA Lbr Chi/2nd Weight Sex Delivery Anes PTL Lv   2 Current            1 Ectopic 18 7w0d    ECTOPIC                  SH: tob neg , EtOH neg, drugs neg  FH: heart dz pos , diabetes pos , cancer pos    General ROS: abd pain, edema.   All other systems reviewed and are negative.      Physical Examination: General appearance - alert, well appearing, and in no distress  Vital signs - LMP 2019   HEENT: normocephalic, atraumatic,oropharynx clear, appearance of ears and nose normal  Neck - supple, no significant adenopathy, no thyromegaly  Lymphatics - no palpable lymphadenopathy in the neck or groin, no hepatosplenomegaly  Chest - clear to auscultation, no wheezes, rales or rhonchi, respiratory effort non-labored  Heart - normal rate, regular rhythm, normal S1, S2, no murmurs, rubs, clicks or gallops, no JVD, no lower extremity edema  Abdomen - soft, nontender, nondistended, no masses, no hepatosplenomegaly  no rebound tenderness noted, bowel sounds normal  Fundus- U, soft, no apprec tend, no apprec " tightness with palpation during pain  Back: no CVAT  Vaginal Exam: cl/th/hi, no blood in vault ,external genitalia normal  Extremities - no pedal edema noted, no calf tend  Skin -warm and dry, normal coloration and turgor, no rashes, no suspicious skin lesions noted      Fetal monitoring: ~140 by US    Radiology +FHT's, normal fluid, active fetus, ant/fundal placenta    Assessment 1)IUP 19 3/7 weeks    2)lower abd pain- doubt  labor, no evid pains are contractions (pain does no crescendo and decrescendo, but is immed onset and offset), poss kidney stone (no blood in urine, but 20% have no blood in urine)   3)multiple medical problems (POTS, autonomics failure, chron reg pain sydrome)- stable on current meds    Plan 1)observe     2)IV hydration     3)pain meds    4)if no improvement with hydration and pain meds, consider CT scan   5)discussed with Dr. Evelia Banegas MD  2019  11:03 PM

## 2019-06-15 NOTE — DISCHARGE SUMMARY
Doing much better this am, no bleeding, pain or LOF  Positive FHT    D/C home    Has f/u scheduled for Thursday with Marni.

## 2019-06-16 LAB — BACTERIA SPEC AEROBE CULT: NORMAL

## 2019-06-17 ENCOUNTER — TELEPHONE (OUTPATIENT)
Dept: CARDIOLOGY | Facility: CLINIC | Age: 24
End: 2019-06-17

## 2019-06-17 DIAGNOSIS — G90.A POTS (POSTURAL ORTHOSTATIC TACHYCARDIA SYNDROME): Primary | ICD-10-CM

## 2019-06-17 NOTE — TELEPHONE ENCOUNTER
Patient was admitted to the hospital on 6/15/19. She was severley dehydrated and started having contractions. Since she is only 19 weeks pregnant, do you want to go ahead and start weekly maintenance IV fluids? She said that on her last visit you discussed this with her.

## 2019-06-20 ENCOUNTER — OFFICE VISIT (OUTPATIENT)
Dept: OBSTETRICS AND GYNECOLOGY | Facility: HOSPITAL | Age: 24
End: 2019-06-20

## 2019-06-20 ENCOUNTER — HOSPITAL ENCOUNTER (OUTPATIENT)
Dept: WOMENS IMAGING | Facility: HOSPITAL | Age: 24
Discharge: HOME | End: 2019-06-20
Admitting: OBSTETRICS & GYNECOLOGY

## 2019-06-20 VITALS
SYSTOLIC BLOOD PRESSURE: 110 MMHG | BODY MASS INDEX: 42.06 KG/M2 | DIASTOLIC BLOOD PRESSURE: 65 MMHG | WEIGHT: 284 LBS | HEIGHT: 69 IN

## 2019-06-20 DIAGNOSIS — O99.210 MATERNAL MORBID OBESITY, ANTEPARTUM (HCC): ICD-10-CM

## 2019-06-20 DIAGNOSIS — O99.342 DEPRESSION AFFECTING PREGNANCY IN SECOND TRIMESTER, ANTEPARTUM: ICD-10-CM

## 2019-06-20 DIAGNOSIS — G90.A POTS (POSTURAL ORTHOSTATIC TACHYCARDIA SYNDROME): ICD-10-CM

## 2019-06-20 DIAGNOSIS — E66.01 MATERNAL MORBID OBESITY, ANTEPARTUM (HCC): ICD-10-CM

## 2019-06-20 DIAGNOSIS — O99.212 OBESITY AFFECTING PREGNANCY IN SECOND TRIMESTER: ICD-10-CM

## 2019-06-20 DIAGNOSIS — J45.909 ASTHMA, UNSPECIFIED ASTHMA SEVERITY, UNSPECIFIED WHETHER COMPLICATED, UNSPECIFIED WHETHER PERSISTENT: ICD-10-CM

## 2019-06-20 DIAGNOSIS — G90.A POTS (POSTURAL ORTHOSTATIC TACHYCARDIA SYNDROME): Primary | ICD-10-CM

## 2019-06-20 DIAGNOSIS — F32.A DEPRESSION AFFECTING PREGNANCY IN SECOND TRIMESTER, ANTEPARTUM: ICD-10-CM

## 2019-06-20 PROBLEM — E66.812 CLASS 2 OBESITY DUE TO EXCESS CALORIES IN ADULT: Status: RESOLVED | Noted: 2019-03-18 | Resolved: 2019-06-20

## 2019-06-20 PROBLEM — E66.09 CLASS 2 OBESITY DUE TO EXCESS CALORIES IN ADULT: Status: RESOLVED | Noted: 2019-03-18 | Resolved: 2019-06-20

## 2019-06-20 PROCEDURE — 76811 OB US DETAILED SNGL FETUS: CPT | Performed by: OBSTETRICS & GYNECOLOGY

## 2019-06-20 PROCEDURE — 76811 OB US DETAILED SNGL FETUS: CPT

## 2019-06-20 PROCEDURE — 99241 PR OFFICE CONSULTATION NEW/ESTAB PATIENT 15 MIN: CPT | Performed by: OBSTETRICS & GYNECOLOGY

## 2019-06-20 RX ORDER — PRENATAL WITH FERROUS FUM AND FOLIC ACID 3080; 920; 120; 400; 22; 1.84; 3; 20; 10; 1; 12; 200; 27; 25; 2 [IU]/1; [IU]/1; MG/1; [IU]/1; MG/1; MG/1; MG/1; MG/1; MG/1; MG/1; UG/1; MG/1; MG/1; MG/1; MG/1
1 TABLET ORAL DAILY
COMMUNITY
End: 2020-03-02

## 2019-06-20 NOTE — PROGRESS NOTES
"Pt denies problems with pregnancy.  Reports \"last week had contractions and was admitted and given IV fluids that resolved UC's.\"  Reports \"have passed out 4 times with pregnancy because of my POTS.\"  To see OB next.  Denies having genetic screenings.  "

## 2019-06-20 NOTE — PROGRESS NOTES
Documentation of the ultasound findings, images, and interpretations as well as consultation note will be available in the patient's Viewpoint report located in the Chart Review Imaging tab in [employer].

## 2019-07-09 ENCOUNTER — HOSPITAL ENCOUNTER (OUTPATIENT)
Dept: ONCOLOGY | Facility: HOSPITAL | Age: 24
Setting detail: INFUSION SERIES
Discharge: HOME | End: 2019-07-09

## 2019-07-09 ENCOUNTER — APPOINTMENT (OUTPATIENT)
Dept: ONCOLOGY | Facility: HOSPITAL | Age: 24
End: 2019-07-09

## 2019-07-09 VITALS
SYSTOLIC BLOOD PRESSURE: 129 MMHG | HEIGHT: 69 IN | RESPIRATION RATE: 20 BRPM | WEIGHT: 287 LBS | HEART RATE: 81 BPM | BODY MASS INDEX: 42.51 KG/M2 | TEMPERATURE: 98.7 F | DIASTOLIC BLOOD PRESSURE: 70 MMHG

## 2019-07-09 DIAGNOSIS — R10.30 PREGNANCY RELATED BILATERAL LOWER ABDOMINAL PAIN, ANTEPARTUM: Primary | ICD-10-CM

## 2019-07-09 DIAGNOSIS — O26.899 PREGNANCY RELATED BILATERAL LOWER ABDOMINAL PAIN, ANTEPARTUM: Primary | ICD-10-CM

## 2019-07-09 PROCEDURE — 96360 HYDRATION IV INFUSION INIT: CPT

## 2019-07-09 RX ORDER — SODIUM CHLORIDE 9 MG/ML
1500 INJECTION, SOLUTION INTRAVENOUS ONCE
Status: CANCELLED | OUTPATIENT
Start: 2019-07-09

## 2019-07-09 RX ORDER — SODIUM CHLORIDE 9 MG/ML
1500 INJECTION, SOLUTION INTRAVENOUS AS NEEDED
Status: CANCELLED | OUTPATIENT
Start: 2019-07-09

## 2019-07-09 RX ORDER — SODIUM CHLORIDE 9 MG/ML
INJECTION, SOLUTION INTRAVENOUS ONCE
Status: CANCELLED | OUTPATIENT
Start: 2019-07-09

## 2019-07-09 RX ORDER — SODIUM CHLORIDE 9 MG/ML
1500 INJECTION, SOLUTION INTRAVENOUS ONCE
Status: COMPLETED | OUTPATIENT
Start: 2019-07-09 | End: 2019-07-09

## 2019-07-09 RX ORDER — SODIUM CHLORIDE 9 MG/ML
1500 INJECTION, SOLUTION INTRAVENOUS AS NEEDED
Status: DISCONTINUED | OUTPATIENT
Start: 2019-07-09 | End: 2019-07-10 | Stop reason: HOSPADM

## 2019-07-09 RX ADMIN — SODIUM CHLORIDE 1500 ML: 9 INJECTION, SOLUTION INTRAVENOUS at 15:13

## 2019-07-15 ENCOUNTER — TELEPHONE (OUTPATIENT)
Dept: CARDIOLOGY | Facility: CLINIC | Age: 24
End: 2019-07-15

## 2019-07-15 DIAGNOSIS — G90.A POTS (POSTURAL ORTHOSTATIC TACHYCARDIA SYNDROME): Primary | ICD-10-CM

## 2019-07-15 NOTE — TELEPHONE ENCOUNTER
Patient called to ask for a PICC line because they have to stick her 5-6 times at her last infusion. I explained infection risk to her and her baby, but she requested I still ask you.

## 2019-07-16 ENCOUNTER — HOSPITAL ENCOUNTER (OUTPATIENT)
Dept: ONCOLOGY | Facility: HOSPITAL | Age: 24
Setting detail: INFUSION SERIES
Discharge: HOME | End: 2019-07-16

## 2019-07-16 VITALS
RESPIRATION RATE: 20 BRPM | HEART RATE: 107 BPM | TEMPERATURE: 97.6 F | DIASTOLIC BLOOD PRESSURE: 87 MMHG | SYSTOLIC BLOOD PRESSURE: 140 MMHG | WEIGHT: 290 LBS | BODY MASS INDEX: 42.95 KG/M2 | HEIGHT: 69 IN

## 2019-07-16 DIAGNOSIS — R10.30 PREGNANCY RELATED BILATERAL LOWER ABDOMINAL PAIN, ANTEPARTUM: Primary | ICD-10-CM

## 2019-07-16 DIAGNOSIS — O26.899 PREGNANCY RELATED BILATERAL LOWER ABDOMINAL PAIN, ANTEPARTUM: Primary | ICD-10-CM

## 2019-07-16 PROCEDURE — 96360 HYDRATION IV INFUSION INIT: CPT

## 2019-07-16 PROCEDURE — 36415 COLL VENOUS BLD VENIPUNCTURE: CPT

## 2019-07-16 PROCEDURE — 96361 HYDRATE IV INFUSION ADD-ON: CPT

## 2019-07-16 RX ORDER — SODIUM CHLORIDE 9 MG/ML
1500 INJECTION, SOLUTION INTRAVENOUS AS NEEDED
Status: DISCONTINUED | OUTPATIENT
Start: 2019-07-16 | End: 2019-07-17 | Stop reason: HOSPADM

## 2019-07-16 RX ORDER — SODIUM CHLORIDE 9 MG/ML
1500 INJECTION, SOLUTION INTRAVENOUS AS NEEDED
Status: CANCELLED | OUTPATIENT
Start: 2019-07-16

## 2019-07-16 RX ORDER — DROXIDOPA 300 MG/1
300 CAPSULE ORAL 3 TIMES DAILY
Qty: 270 CAPSULE | Refills: 3 | Status: SHIPPED | OUTPATIENT
Start: 2019-07-16 | End: 2020-07-23

## 2019-07-16 RX ORDER — DROXIDOPA 200 MG/1
200 CAPSULE ORAL 3 TIMES DAILY
Qty: 270 CAPSULE | Refills: 3 | Status: SHIPPED | OUTPATIENT
Start: 2019-07-16 | End: 2020-02-08 | Stop reason: SDUPTHER

## 2019-07-16 RX ADMIN — SODIUM CHLORIDE 1500 ML: 9 INJECTION, SOLUTION INTRAVENOUS at 14:52

## 2019-07-16 NOTE — TELEPHONE ENCOUNTER
Patient notified. PICC nurse paged, Ada Humphries. She was made aware of orders and said to make sure it is scheduled in interventional radiology. Aristides will send the order to central scheduling for them to arrange.

## 2019-07-18 DIAGNOSIS — G90.A POTS (POSTURAL ORTHOSTATIC TACHYCARDIA SYNDROME): Primary | ICD-10-CM

## 2019-07-22 ENCOUNTER — HOSPITAL ENCOUNTER (OUTPATIENT)
Dept: INFUSION THERAPY | Facility: HOSPITAL | Age: 24
Discharge: HOME | End: 2019-07-22
Admitting: INTERNAL MEDICINE

## 2019-07-22 VITALS
SYSTOLIC BLOOD PRESSURE: 116 MMHG | BODY MASS INDEX: 40.18 KG/M2 | WEIGHT: 287 LBS | HEART RATE: 93 BPM | OXYGEN SATURATION: 97 % | HEIGHT: 71 IN | TEMPERATURE: 97.7 F | RESPIRATION RATE: 20 BRPM | DIASTOLIC BLOOD PRESSURE: 75 MMHG

## 2019-07-22 DIAGNOSIS — G90.A POTS (POSTURAL ORTHOSTATIC TACHYCARDIA SYNDROME): ICD-10-CM

## 2019-07-22 PROCEDURE — C1751 CATH, INF, PER/CENT/MIDLINE: HCPCS

## 2019-07-22 PROCEDURE — C1894 INTRO/SHEATH, NON-LASER: HCPCS

## 2019-07-22 RX ORDER — SODIUM CHLORIDE 0.9 % (FLUSH) 0.9 %
10 SYRINGE (ML) INJECTION AS NEEDED
Status: DISCONTINUED | OUTPATIENT
Start: 2019-07-22 | End: 2019-07-24 | Stop reason: HOSPADM

## 2019-07-22 RX ORDER — SODIUM CHLORIDE 0.9 % (FLUSH) 0.9 %
10 SYRINGE (ML) INJECTION EVERY 12 HOURS SCHEDULED
Status: DISCONTINUED | OUTPATIENT
Start: 2019-07-22 | End: 2019-07-24 | Stop reason: HOSPADM

## 2019-07-22 NOTE — PROGRESS NOTES
Placed by Sally Larry RN - confirmed with 3cg technology.  RUE single lumen PICC with 44cm total and 0cm exposed.

## 2019-07-22 NOTE — PROGRESS NOTES
Picc line placed in Rupper arm. Dressing clean,dry, and intact with stockinet cover.   Given set of printed discharge instructions.   Discharged ambulatory.

## 2019-07-23 ENCOUNTER — HOSPITAL ENCOUNTER (OUTPATIENT)
Dept: ONCOLOGY | Facility: HOSPITAL | Age: 24
Setting detail: INFUSION SERIES
Discharge: HOME | End: 2019-07-23

## 2019-07-23 ENCOUNTER — TELEPHONE (OUTPATIENT)
Dept: CARDIOLOGY | Facility: CLINIC | Age: 24
End: 2019-07-23

## 2019-07-23 VITALS
SYSTOLIC BLOOD PRESSURE: 118 MMHG | WEIGHT: 289 LBS | DIASTOLIC BLOOD PRESSURE: 70 MMHG | HEIGHT: 71 IN | HEART RATE: 95 BPM | BODY MASS INDEX: 40.46 KG/M2 | TEMPERATURE: 97.3 F | RESPIRATION RATE: 16 BRPM

## 2019-07-23 DIAGNOSIS — O26.899 PREGNANCY RELATED BILATERAL LOWER ABDOMINAL PAIN, ANTEPARTUM: Primary | ICD-10-CM

## 2019-07-23 DIAGNOSIS — R10.30 PREGNANCY RELATED BILATERAL LOWER ABDOMINAL PAIN, ANTEPARTUM: Primary | ICD-10-CM

## 2019-07-23 PROCEDURE — 96361 HYDRATE IV INFUSION ADD-ON: CPT

## 2019-07-23 PROCEDURE — 96360 HYDRATION IV INFUSION INIT: CPT

## 2019-07-23 RX ORDER — SODIUM CHLORIDE 9 MG/ML
1500 INJECTION, SOLUTION INTRAVENOUS AS NEEDED
Status: CANCELLED | OUTPATIENT
Start: 2019-07-23

## 2019-07-23 RX ORDER — SODIUM CHLORIDE 9 MG/ML
1500 INJECTION, SOLUTION INTRAVENOUS AS NEEDED
Status: DISCONTINUED | OUTPATIENT
Start: 2019-07-23 | End: 2019-07-24 | Stop reason: HOSPADM

## 2019-07-23 RX ADMIN — SODIUM CHLORIDE 1500 ML: 9 INJECTION, SOLUTION INTRAVENOUS at 14:41

## 2019-07-23 NOTE — TELEPHONE ENCOUNTER
[facility] called regarding Northera prescription . They requested a us to give a verbal order for her Northera so they could send it to the patient. Her pharmacy has changed. I called this is and they will be contacting her.

## 2019-07-30 ENCOUNTER — HOSPITAL ENCOUNTER (OUTPATIENT)
Dept: ONCOLOGY | Facility: HOSPITAL | Age: 24
Setting detail: INFUSION SERIES
Discharge: HOME | End: 2019-07-30

## 2019-07-30 VITALS
TEMPERATURE: 97.3 F | SYSTOLIC BLOOD PRESSURE: 131 MMHG | DIASTOLIC BLOOD PRESSURE: 72 MMHG | BODY MASS INDEX: 40.6 KG/M2 | HEIGHT: 71 IN | RESPIRATION RATE: 16 BRPM | HEART RATE: 95 BPM | WEIGHT: 290 LBS

## 2019-07-30 DIAGNOSIS — O26.899 PREGNANCY RELATED BILATERAL LOWER ABDOMINAL PAIN, ANTEPARTUM: Primary | ICD-10-CM

## 2019-07-30 DIAGNOSIS — R10.30 PREGNANCY RELATED BILATERAL LOWER ABDOMINAL PAIN, ANTEPARTUM: Primary | ICD-10-CM

## 2019-07-30 PROCEDURE — 96361 HYDRATE IV INFUSION ADD-ON: CPT

## 2019-07-30 PROCEDURE — 96360 HYDRATION IV INFUSION INIT: CPT

## 2019-07-30 RX ORDER — SODIUM CHLORIDE 9 MG/ML
1500 INJECTION, SOLUTION INTRAVENOUS AS NEEDED
Status: CANCELLED | OUTPATIENT
Start: 2019-07-30

## 2019-07-30 RX ORDER — SODIUM CHLORIDE 9 MG/ML
1500 INJECTION, SOLUTION INTRAVENOUS AS NEEDED
Status: DISCONTINUED | OUTPATIENT
Start: 2019-07-30 | End: 2019-07-31 | Stop reason: HOSPADM

## 2019-07-30 RX ORDER — SODIUM CHLORIDE 0.9 % (FLUSH) 0.9 %
10 SYRINGE (ML) INJECTION AS NEEDED
Status: CANCELLED | OUTPATIENT
Start: 2019-07-30

## 2019-07-30 RX ADMIN — SODIUM CHLORIDE 1500 ML: 9 INJECTION, SOLUTION INTRAVENOUS at 14:37

## 2019-08-05 ENCOUNTER — PATIENT MESSAGE (OUTPATIENT)
Dept: FAMILY MEDICINE CLINIC | Facility: CLINIC | Age: 24
End: 2019-08-05

## 2019-08-05 RX ORDER — BISOPROLOL FUMARATE 5 MG/1
5 TABLET, FILM COATED ORAL 2 TIMES DAILY
Qty: 60 TABLET | Refills: 11 | Status: SHIPPED | OUTPATIENT
Start: 2019-08-05 | End: 2020-08-22 | Stop reason: SDUPTHER

## 2019-08-05 NOTE — TELEPHONE ENCOUNTER
From: Ammon Vargas  To: Eb Prabhakar PA  Sent: 8/5/2019 9:33 AM EDT  Subject: Prescription Question    I need my epi pens prescription sent to [facility] pharmacy. Thank you

## 2019-08-06 ENCOUNTER — HOSPITAL ENCOUNTER (OUTPATIENT)
Dept: ONCOLOGY | Facility: HOSPITAL | Age: 24
Setting detail: INFUSION SERIES
Discharge: HOME | End: 2019-08-06

## 2019-08-06 VITALS
RESPIRATION RATE: 16 BRPM | HEART RATE: 101 BPM | DIASTOLIC BLOOD PRESSURE: 70 MMHG | HEIGHT: 71 IN | TEMPERATURE: 98.2 F | BODY MASS INDEX: 40.6 KG/M2 | WEIGHT: 290 LBS | SYSTOLIC BLOOD PRESSURE: 118 MMHG

## 2019-08-06 DIAGNOSIS — R10.30 PREGNANCY RELATED BILATERAL LOWER ABDOMINAL PAIN, ANTEPARTUM: Primary | ICD-10-CM

## 2019-08-06 DIAGNOSIS — O26.899 PREGNANCY RELATED BILATERAL LOWER ABDOMINAL PAIN, ANTEPARTUM: Primary | ICD-10-CM

## 2019-08-06 PROCEDURE — 96361 HYDRATE IV INFUSION ADD-ON: CPT

## 2019-08-06 PROCEDURE — 96360 HYDRATION IV INFUSION INIT: CPT

## 2019-08-06 RX ORDER — EPINEPHRINE 0.3 MG/.3ML
0.3 INJECTION SUBCUTANEOUS ONCE AS NEEDED
Qty: 2 EACH | Refills: 0 | Status: SHIPPED | OUTPATIENT
Start: 2019-08-06 | End: 2021-05-14 | Stop reason: SDUPTHER

## 2019-08-06 RX ORDER — SODIUM CHLORIDE 9 MG/ML
1500 INJECTION, SOLUTION INTRAVENOUS AS NEEDED
Status: DISCONTINUED | OUTPATIENT
Start: 2019-08-06 | End: 2019-08-07 | Stop reason: HOSPADM

## 2019-08-06 RX ORDER — CEPHALEXIN 500 MG/1
500 CAPSULE ORAL 3 TIMES DAILY
Qty: 30 CAPSULE | Refills: 0 | Status: SHIPPED | OUTPATIENT
Start: 2019-08-06 | End: 2019-08-16

## 2019-08-06 RX ORDER — SODIUM CHLORIDE 9 MG/ML
1500 INJECTION, SOLUTION INTRAVENOUS AS NEEDED
Status: CANCELLED | OUTPATIENT
Start: 2019-08-06

## 2019-08-06 RX ADMIN — SODIUM CHLORIDE 1500 ML: 9 INJECTION, SOLUTION INTRAVENOUS at 15:04

## 2019-08-06 NOTE — PROGRESS NOTES
Our office was called today by Zofia ALICEA in outpatient infusion concerning Ammon's PICC line. I saw patient there in outpatient infusion and examined PICC line site. Noted small 1 cm red hard area at site of PICC line entrance into her arm, little to no streaking, but painful to touch. Patient has noted it has been red for about one week but denies fevers, chills, malaise and otherwise feels well.   Recommend the PICC be pulled and will prescribe Keflex 500 mg TID x 10 days. Antibiotic choice has been discussed with pharmacy. She will come to our office next Wednesday after completion of antibiotic for Dr. Moran to look at the site.     Electronically signed by VERONICA Cormier, 08/06/19, 4:02 PM.

## 2019-08-08 ENCOUNTER — HOSPITAL ENCOUNTER (OUTPATIENT)
Facility: HOSPITAL | Age: 24
Discharge: HOME | End: 2019-08-09
Attending: OBSTETRICS & GYNECOLOGY | Admitting: OBSTETRICS & GYNECOLOGY

## 2019-08-08 VITALS — TEMPERATURE: 98.3 F | RESPIRATION RATE: 18 BRPM | BODY MASS INDEX: 42.95 KG/M2 | HEIGHT: 69 IN | WEIGHT: 290 LBS

## 2019-08-08 LAB
BACTERIA UR QL AUTO: NORMAL /HPF
BILIRUB UR QL STRIP: NEGATIVE
CLARITY UR: CLEAR
COLOR UR: YELLOW
GLUCOSE UR STRIP-MCNC: NEGATIVE MG/DL
HGB UR QL STRIP.AUTO: NEGATIVE
HYALINE CASTS UR QL AUTO: NORMAL /LPF
KETONES UR QL STRIP: NEGATIVE
LEUKOCYTE ESTERASE UR QL STRIP.AUTO: ABNORMAL
NITRITE UR QL STRIP: NEGATIVE
PH UR STRIP.AUTO: 7 [PH] (ref 5–8)
PROT UR QL STRIP: NEGATIVE
RBC # UR: NORMAL /HPF
REF LAB TEST METHOD: NORMAL
SP GR UR STRIP: 1.01 (ref 1–1.03)
SQUAMOUS #/AREA URNS HPF: NORMAL /HPF
UROBILINOGEN UR QL STRIP: ABNORMAL
WBC UR QL AUTO: NORMAL /HPF

## 2019-08-08 PROCEDURE — 81001 URINALYSIS AUTO W/SCOPE: CPT | Performed by: OBSTETRICS & GYNECOLOGY

## 2019-08-08 PROCEDURE — 87086 URINE CULTURE/COLONY COUNT: CPT | Performed by: OBSTETRICS & GYNECOLOGY

## 2019-08-09 PROCEDURE — 99213 OFFICE O/P EST LOW 20 MIN: CPT | Performed by: OBSTETRICS & GYNECOLOGY

## 2019-08-09 PROCEDURE — G0463 HOSPITAL OUTPT CLINIC VISIT: HCPCS

## 2019-08-09 PROCEDURE — 59025 FETAL NON-STRESS TEST: CPT

## 2019-08-09 NOTE — H&P
Reta  Obstetric History and Physical    Chief Complaint   Patient presents with   • Contractions       Subjective     Patient is a 24 y.o. female  currently at 27w4d, who presents with complaints of possible contractions and severe pelvic pain status post intercourse this afternoon.  She denies vaginal bleeding or leakage of fluid.  She has noted normal fetal movement.    Her prenatal care is benign to date.. Her previous obstetric/gynecological history is noncontributory.    The following portions of the patients history were reviewed and updated as appropriate: current medications, allergies, past medical history, past surgical history, past family history, past social history and problem list .        Prenatal Information:   Maternal Prenatal Labs  Blood Type No results found for: ABO   Rh Status No results found for: RH   Antibody Screen No results found for: ABSCRN   Gonnorhea No results found for: GCCX   Chlamydia No results found for: CLAMYDCU   RPR No results found for: RPR   Syphilis Antibody No results found for: SYPHILIS   Rubella No results found for: RUBELLAIGGIN   Hepatitis B Surface Antigen No results found for: HEPBSAG   HIV-1 Antibody No results found for: LABHIV1   Hepatitis C Antibody No results found for: HEPCAB   Rapid Urin Drug Screen No results found for: AMPMETHU, BARBITSCNUR, LABBENZSCN, LABMETHSCN, LABOPIASCN, THCURSCR, COCAINEUR, AMPHETSCREEN, PROPOXSCN, BUPRENORSCNU, METAMPSCNUR, OXYCODONESCN, TRICYCLICSCN   Group B Strep Culture No results found for: GBSANTIGEN           External Prenatal Results     Pregnancy Outside Results - Transcribed From Office Records - See Scanned Records For Details     Test Value Date Time    Hgb 10.4 g/dL 19 2346    Hct 32.9 % 19 2346    ABO O  19 0450    Rh Positive  19 0450    Antibody Screen Negative  19 1511    Glucose Fasting GTT       Glucose Tolerance Test 1 hour       Glucose Tolerance Test 3 hour        Gonorrhea (discrete) Negative  18 1530    Chlamydia (discrete) Negative  18 1530    RPR Non-Reactive  19 1511    VDRL       Syphilis Antibody       Rubella 7.9 IU/mL 19 1511      Equivocal  19 1511    HBsAg Non-Reactive  19 1511    Herpes Simplex Virus PCR       Herpes Simplex VIrus Culture       HIV Non-Reactive  19 1511    Hep C RNA Quant PCR       Hep C Antibody Non-Reactive  19 1511    AFP       Group B Strep       GBS Susceptibility to Clindamycin       GBS Susceptibility to Erythromycin       Fetal Fibronectin       Genetic Testing, Maternal Blood             Drug Screening     Test Value Date Time    Urine Drug Screen       Amphetamine Screen Negative ng/mL 05/31/15 1350    Barbiturate Screen Positive ng/mL 05/31/15 1350    Benzodiazepine Screen Positive ng/mL 05/31/15 1350    Methadone Screen Negative ng/mL 05/31/15 1350    Phencyclidine Screen Negative ng/mL 05/31/15 1350    Opiates Screen       THC Screen       Cocaine Screen       Propoxyphene Screen Negative ng/mL 05/31/15 1350    Buprenorphine Screen Negative ng/mL 05/31/15 1350    Methamphetamine Screen       Oxycodone Screen Negative ng/mL 05/31/15 1350    Tricyclic Antidepressants Screen                     Past OB History:       Obstetric History       T0      L0     SAB0   TAB0   Ectopic1   Molar0   Multiple0   Live Births0       # Outcome Date GA Lbr Chi/2nd Weight Sex Delivery Anes PTL Lv   2 Current            1 Ectopic 18 7w0d    ECTOPIC         Obstetric Comments   FOB #1-G1,2       Past Medical History:  Past Medical History:   Diagnosis Date   • Asthma    • Autonomic failure    • CRPS (complex regional pain syndrome type I)    • Fibromyalgia    • Migraine    • Migraines    • MRSA carrier    • POTS (postural orthostatic tachycardia syndrome)       Past Surgical History Past Surgical History:   Procedure Laterality Date   • ADENOIDECTOMY     • CHOLECYSTECTOMY     • EAR TUBES      • TONSILLECTOMY AND ADENOIDECTOMY        Family History: Family History   Problem Relation Age of Onset   • Diabetes Mother    • Cancer Maternal Grandmother    • Diabetes Maternal Grandmother    • Kidney disease Maternal Grandmother    • Stroke Maternal Grandmother    • Cancer Maternal Grandfather    • Heart attack Maternal Grandfather    • Hearing loss Maternal Grandfather    • Stroke Maternal Grandfather    • Cancer Paternal Grandfather    • Hearing loss Paternal Grandfather    • Heart attack Paternal Grandfather       Social History:  reports that she has never smoked. She has never used smokeless tobacco.   reports that she does not drink alcohol.   reports that she does not use drugs.   Allergies: Banana; Eggs or egg-derived products; Cantaloupe (diagnostic); Mushroom; Shellfish-derived products; and Rizatriptan  Current Medications:          No current facility-administered medications on file prior to encounter.      Current Outpatient Medications on File Prior to Encounter   Medication Sig Dispense Refill   • bisoprolol (ZEBeta) 5 MG tablet Take 1 tablet by mouth 2 (Two) Times a Day. 60 tablet 11   • cephalexin (KEFLEX) 500 MG capsule Take 1 capsule by mouth 3 (Three) Times a Day for 10 days. 30 capsule 0   • Droxidopa (NORTHERA) 200 MG capsule Take 1 capsule by mouth 3 (Three) Times a Day. (Patient taking differently: Take 500 mg by mouth 3 (Three) Times a Day.) 270 capsule 3   • fludrocortisone 0.1 MG tablet Take 2 tablets by mouth Daily With Breakfast. 60 tablet 11   • IRON PO Take 1 tablet by mouth.     • Prenatal Vit-Fe Fumarate-FA (PRENATAL 27-1) 27-1 MG tablet tablet Take  by mouth Daily.     • albuterol (PROAIR HFA) 108 (90 BASE) MCG/ACT inhaler Daily.     • Cetirizine HCl (ZYRTEC ALLERGY PO) Take  by mouth Daily.     • droxidopa (NORTHERA) 300 MG capsule capsule Take 1 capsule by mouth 3 (Three) Times a Day. 270 capsule 3   • EPINEPHrine (EPIPEN 2-BJ) 0.3 MG/0.3ML solution auto-injector injection  Inject 0.3 mL into the appropriate muscle as directed by prescriber 1 (One) Time As Needed (anaphylaxis) for up to 1 dose. 2 each 0   • fluticasone (FLONASE) 50 MCG/ACT nasal spray Instill 2 sprays into the nostril(s) as directed by provider Daily. 16 g 3       General ROS: Pertinent items are noted in HPI, all other systems reviewed and negative    Objective       Vital Signs Range for the last 24 hours  Temperature: Temp:  [98.3 °F (36.8 °C)] 98.3 °F (36.8 °C)   Temp Source: Temp src: Oral   BP:     Pulse:     Respirations: Resp:  [18] 18   SPO2:     O2 Amount (l/min):     O2 Devices     Weight: Weight:  [132 kg (290 lb)] 132 kg (290 lb)     Physical Examination: General appearance - alert, well appearing, and in no distress, oriented to person, place, and time and overweight  Mental status - alert, oriented to person, place, and time, normal mood, behavior, speech, dress, motor activity, and thought processes  Eyes - pupils equal and reactive, extraocular eye movements intact, sclera anicteric  Mouth - mucous membranes moist, pharynx normal without lesions and dental hygiene good  Neck - supple, no significant adenopathy, thyroid exam: thyroid is normal in size without nodules or tenderness  Chest - clear to auscultation, no wheezes, rales or rhonchi, symmetric air entry  Heart - normal rate, regular rhythm, normal S1, S2, no murmurs, rubs, clicks or gallops  Abdomen-soft, nontender, nondistended, no masses or organomegaly   Abdomen, Non-Tender  Pelvic - VULVA: normal appearing vulva with no masses, tenderness or lesions.  Cervix -closed/thick/long.  No evidence of vaginal bleeding.  Neurological - alert, oriented, normal speech, no focal findings or movement disorder noted, DTR's normal and symmetric  Extremities - pedal edema 1 +    Fetal Heart Rate Assessment  Indication: OB triage--pelvic pain/contractions   Start Time:                 end Time: 55   NST Results: NST reactive and appropriate for  "gestational age.  Fetal heart rate baseline 120-130 bpm.  10 x 10 accelerations noted.  No decelerations.  No evidence of regular uterine contraction pattern.        Laboratory Results:   Lab Results   Component Value Date    ALKPHOS 97 2019    ALT 10 2019    AST 12 2019    CREATININE 0.38 (L) 2019    BILITOT 0.2 2019       No results found for: WBC, RBC, HGB, HCT, MCV, MCH, MCHC, RDW, RDWSD, MPV, PLT, NEUTRORELPCT, LYMPHORELPCT, MONORELPCT, EOSRELPCT, BASORELPCT, AUTOIGPER, NEUTROABS, LYMPHSABS, MONOSABS, EOSABS, BASOSABS, AUTOIGNUM, NRBC          Brief Urine Lab Results  (Last result in the past 365 days)      Color   Clarity   Blood   Leuk Est   Nitrite   Protein   CREAT   Urine HCG        19 2252 Yellow Clear Negative Trace Negative Negative                 Radiology Review: No new studies  Other Studies: Urinalysis shows 0-2 white blood cells and no evidence of bacteria.  Nitrites were negative and no evidence of blood.  Leukocyte esterase was trace.  Urine culture sent.    Assessment/Plan       * No active hospital problems. *        Assessment:  1. Pelvic pain in pregnancy.  2. Round ligament pain.  3. No evidence of  labor.    Plan:  1. Reviewed signs/symptoms of  labor.  Discussed round ligament pain.  2. Discharge home.  3. Keep regularly scheduled OB office visit.     Total time spent today with Ammon  was 20 minutes (level 3).  Of this time, > 50% was spent face-to-face time coordinating care, answering her questions and counseling regarding pathophysiology of her presenting problem along with plans for any diagnostic work-up and treatment.        Guru Flowers \"Ele\" SHERRY Currie MD  2019  1:53 AM    "

## 2019-08-10 LAB — BACTERIA SPEC AEROBE CULT: NORMAL

## 2019-08-13 ENCOUNTER — TRANSCRIBE ORDERS (OUTPATIENT)
Dept: LAB | Facility: HOSPITAL | Age: 24
End: 2019-08-13

## 2019-08-13 ENCOUNTER — LAB (OUTPATIENT)
Dept: LAB | Facility: HOSPITAL | Age: 24
End: 2019-08-13

## 2019-08-13 ENCOUNTER — HOSPITAL ENCOUNTER (OUTPATIENT)
Dept: ONCOLOGY | Facility: HOSPITAL | Age: 24
Setting detail: INFUSION SERIES
Discharge: HOME | End: 2019-08-13

## 2019-08-13 VITALS
HEIGHT: 69 IN | SYSTOLIC BLOOD PRESSURE: 119 MMHG | WEIGHT: 293 LBS | RESPIRATION RATE: 16 BRPM | BODY MASS INDEX: 43.4 KG/M2 | DIASTOLIC BLOOD PRESSURE: 63 MMHG | TEMPERATURE: 98.1 F | HEART RATE: 86 BPM

## 2019-08-13 DIAGNOSIS — Z34.03 ENCOUNTER FOR SUPERVISION OF NORMAL FIRST PREGNANCY IN THIRD TRIMESTER: ICD-10-CM

## 2019-08-13 DIAGNOSIS — O26.899 PREGNANCY RELATED BILATERAL LOWER ABDOMINAL PAIN, ANTEPARTUM: Primary | ICD-10-CM

## 2019-08-13 DIAGNOSIS — R10.30 PREGNANCY RELATED BILATERAL LOWER ABDOMINAL PAIN, ANTEPARTUM: Primary | ICD-10-CM

## 2019-08-13 DIAGNOSIS — Z34.03 ENCOUNTER FOR SUPERVISION OF NORMAL FIRST PREGNANCY IN THIRD TRIMESTER: Primary | ICD-10-CM

## 2019-08-13 LAB
BLD GP AB SCN SERPL QL: NEGATIVE
DEPRECATED RDW RBC AUTO: 45.9 FL (ref 37–54)
ERYTHROCYTE [DISTWIDTH] IN BLOOD BY AUTOMATED COUNT: 15.2 % (ref 12.3–15.4)
GLUCOSE 1H P 100 G GLC PO SERPL-MCNC: 136 MG/DL
HCT VFR BLD AUTO: 36.8 % (ref 34–46.6)
HGB BLD-MCNC: 11.2 G/DL (ref 12–15.9)
MCH RBC QN AUTO: 25.7 PG (ref 26.6–33)
MCHC RBC AUTO-ENTMCNC: 30.4 G/DL (ref 31.5–35.7)
MCV RBC AUTO: 84.6 FL (ref 79–97)
PLATELET # BLD AUTO: 360 10*3/MM3 (ref 140–450)
PMV BLD AUTO: 10.3 FL (ref 6–12)
RBC # BLD AUTO: 4.35 10*6/MM3 (ref 3.77–5.28)
WBC NRBC COR # BLD: 10.42 10*3/MM3 (ref 3.4–10.8)

## 2019-08-13 PROCEDURE — 36415 COLL VENOUS BLD VENIPUNCTURE: CPT

## 2019-08-13 PROCEDURE — 96360 HYDRATION IV INFUSION INIT: CPT

## 2019-08-13 PROCEDURE — 96361 HYDRATE IV INFUSION ADD-ON: CPT

## 2019-08-13 PROCEDURE — 82950 GLUCOSE TEST: CPT

## 2019-08-13 PROCEDURE — 86850 RBC ANTIBODY SCREEN: CPT

## 2019-08-13 PROCEDURE — 85027 COMPLETE CBC AUTOMATED: CPT

## 2019-08-13 RX ORDER — SODIUM CHLORIDE 9 MG/ML
1500 INJECTION, SOLUTION INTRAVENOUS AS NEEDED
Status: CANCELLED | OUTPATIENT
Start: 2019-08-13

## 2019-08-13 RX ORDER — SODIUM CHLORIDE 9 MG/ML
1500 INJECTION, SOLUTION INTRAVENOUS AS NEEDED
Status: DISCONTINUED | OUTPATIENT
Start: 2019-08-13 | End: 2019-08-14 | Stop reason: HOSPADM

## 2019-08-13 RX ADMIN — SODIUM CHLORIDE 1500 ML: 9 INJECTION, SOLUTION INTRAVENOUS at 14:45

## 2019-08-14 ENCOUNTER — OFFICE VISIT (OUTPATIENT)
Dept: CARDIOLOGY | Facility: CLINIC | Age: 24
End: 2019-08-14

## 2019-08-14 DIAGNOSIS — G90.A POTS (POSTURAL ORTHOSTATIC TACHYCARDIA SYNDROME): Primary | ICD-10-CM

## 2019-08-14 PROCEDURE — 99024 POSTOP FOLLOW-UP VISIT: CPT | Performed by: INTERNAL MEDICINE

## 2019-08-14 NOTE — PROGRESS NOTES
Patient comes in for wound check today after PICC line removal last week and 6 days of Keflex. Site appears WNL and normally healing small puncture site with scabbing and no sign of erythema or infection. Not draining.     Electronically signed by VERONICA Cormier, 08/14/19, 4:28 PM.

## 2019-08-20 ENCOUNTER — HOSPITAL ENCOUNTER (OUTPATIENT)
Dept: ONCOLOGY | Facility: HOSPITAL | Age: 24
Setting detail: INFUSION SERIES
Discharge: HOME | End: 2019-08-20

## 2019-08-20 VITALS
DIASTOLIC BLOOD PRESSURE: 58 MMHG | BODY MASS INDEX: 43.4 KG/M2 | RESPIRATION RATE: 16 BRPM | WEIGHT: 293 LBS | TEMPERATURE: 98.2 F | HEIGHT: 69 IN | SYSTOLIC BLOOD PRESSURE: 113 MMHG | HEART RATE: 77 BPM

## 2019-08-20 DIAGNOSIS — R10.30 PREGNANCY RELATED BILATERAL LOWER ABDOMINAL PAIN, ANTEPARTUM: Primary | ICD-10-CM

## 2019-08-20 DIAGNOSIS — O26.899 PREGNANCY RELATED BILATERAL LOWER ABDOMINAL PAIN, ANTEPARTUM: Primary | ICD-10-CM

## 2019-08-20 PROCEDURE — 96360 HYDRATION IV INFUSION INIT: CPT

## 2019-08-20 PROCEDURE — 96361 HYDRATE IV INFUSION ADD-ON: CPT

## 2019-08-20 RX ORDER — SODIUM CHLORIDE 9 MG/ML
1500 INJECTION, SOLUTION INTRAVENOUS AS NEEDED
Status: DISCONTINUED | OUTPATIENT
Start: 2019-08-20 | End: 2019-08-21 | Stop reason: HOSPADM

## 2019-08-20 RX ORDER — SODIUM CHLORIDE 9 MG/ML
1500 INJECTION, SOLUTION INTRAVENOUS AS NEEDED
Status: CANCELLED | OUTPATIENT
Start: 2019-08-20

## 2019-08-20 RX ADMIN — SODIUM CHLORIDE 1500 ML: 9 INJECTION, SOLUTION INTRAVENOUS at 14:55

## 2019-08-21 ENCOUNTER — OFFICE VISIT (OUTPATIENT)
Dept: CARDIOLOGY | Facility: CLINIC | Age: 24
End: 2019-08-21

## 2019-08-21 VITALS
HEART RATE: 87 BPM | DIASTOLIC BLOOD PRESSURE: 70 MMHG | OXYGEN SATURATION: 96 % | WEIGHT: 293 LBS | SYSTOLIC BLOOD PRESSURE: 110 MMHG | BODY MASS INDEX: 41.02 KG/M2 | HEIGHT: 71 IN

## 2019-08-21 DIAGNOSIS — G90.A POTS (POSTURAL ORTHOSTATIC TACHYCARDIA SYNDROME): ICD-10-CM

## 2019-08-21 DIAGNOSIS — R55 VASOVAGAL SYNCOPE: Primary | ICD-10-CM

## 2019-08-21 PROCEDURE — 99213 OFFICE O/P EST LOW 20 MIN: CPT | Performed by: INTERNAL MEDICINE

## 2019-08-21 NOTE — PROGRESS NOTES
Ammon Vargas  1995  [phone]    08/21/2019    [facility] CARDIOLOGY     E[place] Prabhakar, [place]  [address] [facility] [address]    Chief Complaint   Patient presents with   • Vasovagal syncope         PROBLEM LIST:     1.  Syncope, active September 2014:   a. Tilt table, September 2014, showing  cardioinhibitory and vasodepressor components, symptomatic.    b. Event monitor, 10/27/2014:  Sinus tachycardia up to 180 bpm with less strenuous activities, such as activities of daily living.   c. Echocardiogram, 11/07/2014:  EF 53%, IVS 0.95, LVPW  0.95, mild MR and mild TR.   d. Initiation of midodrine and Celexa, November 2014.    e. Hospitalization 02/12/2015 through 02/15 2015 at [facility] for a syncopal episode with negative EEG and CT scan of the head, possible etiology of myoclonic  jerking caused by baclofen; however, the patient is now back on baclofen.    f. Extensive evaluation at [facility], July 2015 with neural evaluation showing no epileptic seizures, normal tilt table test and normal QSART.  g.  Initiation of Northera, August 2015.   h. Echocardiogram 12/13/17: EF 55%, physiologic tricuspid valve regurgitation is present. Estimated right ventricular systolic pressure from tricuspid regurgitation is normal (<35 mmHg)  2. Complex regional pain syndrome  with significant muscle spasm and paresthesias:   a. Followed by Brenton Pineda MD, Sarai Swain, APRN.  3. Frequent MRSA skin infections, active since  age 5.   4. Migraine headaches.  5. Surgical history:   a. Cholecystectomy.   b.  Tonsillectomy and adenoidectomy.  c. Nasal surgery.        Allergies  Allergies   Allergen Reactions   • Banana Anaphylaxis   • Eggs Or Egg-Derived Products Hives, Swelling and Angioedema   • Cantaloupe (Diagnostic) Hives   • Mushroom Hives   • Shellfish-Derived Products Hives   • Rizatriptan Rash       Current Medications    Current Outpatient  Medications:   •  albuterol (PROAIR HFA) 108 (90 BASE) MCG/ACT inhaler, Inhale 1 puff Daily., Disp: , Rfl:   •  bisoprolol (ZEBeta) 5 MG tablet, Take 1 tablet by mouth 2 (Two) Times a Day., Disp: 60 tablet, Rfl: 11  •  Cetirizine HCl (ZYRTEC ALLERGY PO), Take 1 tablet by mouth As Needed., Disp: , Rfl:   •  Droxidopa (NORTHERA) 200 MG capsule, Take 1 capsule by mouth 3 (Three) Times a Day. (Patient taking differently: Take 500 mg by mouth 3 (Three) Times a Day.), Disp: 270 capsule, Rfl: 3  •  droxidopa (NORTHERA) 300 MG capsule capsule, Take 1 capsule by mouth 3 (Three) Times a Day., Disp: 270 capsule, Rfl: 3  •  EPINEPHrine (EPIPEN 2-BJ) 0.3 MG/0.3ML solution auto-injector injection, Inject 0.3 mL into the appropriate muscle as directed by prescriber 1 (One) Time As Needed (anaphylaxis) for up to 1 dose., Disp: 2 each, Rfl: 0  •  fludrocortisone 0.1 MG tablet, Take 2 tablets by mouth Daily With Breakfast., Disp: 60 tablet, Rfl: 11  •  fluticasone (FLONASE) 50 MCG/ACT nasal spray, Instill 2 sprays into the nostril(s) as directed by provider Daily., Disp: 16 g, Rfl: 3  •  IRON PO, Take 1 tablet by mouth., Disp: , Rfl:   •  Prenatal Vit-Fe Fumarate-FA (PRENATAL 27-1) 27-1 MG tablet tablet, Take 1 tablet by mouth Daily., Disp: , Rfl:   No current facility-administered medications for this visit.     History of Present Illness     Pt presents for follow up of syncope. Since we last saw the pt, she is doing much better with receiving IV fluids every Tuesday. She no longer has a PICC line as it was questionably infected. She completed a week of Keflex. She has not had a syncopal episode. She is is drinking water and Gatorade at home. She is 29 weeks pregnant now. She has seen high risk OB and everything checked out ok. She denies CP, TERAN,  LH, and dizziness. Denies any hospitalizations, ER visits. No bleeding issues. Overall feels well for the most part. She has had swelling in her feet at the end of the day which  "resolves after keeping her feet up.     ROS:  General:  Denies fatigue, weight gain or loss  Cardiovascular:  Denies CP, PND, syncope, near syncope, + occasional edema - palpitations.  Pulmonary:  Denies TERAN, cough, or wheezing      Vitals:    08/21/19 1124   BP: 110/70   BP Location: Right arm   Patient Position: Sitting   Pulse: 87   SpO2: 96%   Weight: 136 kg (300 lb)   Height: 180.3 cm (71\")     Body mass index is 41.84 kg/m².  PE:  General: NAD  Neck: no JVD, no carotid bruits, no TM  Heart RRR, NL S1, S2, no rubs, murmurs  Lungs: CTA, no wheezes, rhonchi, or rales  Abd: soft, non-tender, NL BS  Ext: No musculoskeletal deformities, no edema, cyanosis, or clubbing  Psych: normal mood and affect    Diagnostic Data:      Procedures    1. Vasovagal syncope    2. POTS (postural orthostatic tachycardia syndrome)          Plan:  1) VV Syncope/POTS:   - improved with weekly fluid infusions. Continue florinef and Northera. No recent syncopal episodes.       F/up in 6 months    Scribed for Chavez Moran MD by Camille Hopkins PA-C. 8/21/2019  11:42 AM     I, Chavez Moran MD, personally performed the services described in this documentation as scribed by the above named individual in my presence, and it is both accurate and complete.  8/21/2019  11:51 AM          "

## 2019-08-27 ENCOUNTER — HOSPITAL ENCOUNTER (OUTPATIENT)
Dept: ONCOLOGY | Facility: HOSPITAL | Age: 24
Setting detail: INFUSION SERIES
Discharge: HOME | End: 2019-08-27

## 2019-08-27 DIAGNOSIS — O26.899 PREGNANCY RELATED BILATERAL LOWER ABDOMINAL PAIN, ANTEPARTUM: Primary | ICD-10-CM

## 2019-08-27 DIAGNOSIS — R10.30 PREGNANCY RELATED BILATERAL LOWER ABDOMINAL PAIN, ANTEPARTUM: Primary | ICD-10-CM

## 2019-08-27 PROCEDURE — 96360 HYDRATION IV INFUSION INIT: CPT

## 2019-08-27 PROCEDURE — 96361 HYDRATE IV INFUSION ADD-ON: CPT

## 2019-08-27 RX ORDER — SODIUM CHLORIDE 9 MG/ML
1500 INJECTION, SOLUTION INTRAVENOUS AS NEEDED
Status: CANCELLED | OUTPATIENT
Start: 2019-08-27

## 2019-08-27 RX ORDER — SODIUM CHLORIDE 9 MG/ML
1500 INJECTION, SOLUTION INTRAVENOUS AS NEEDED
Status: DISCONTINUED | OUTPATIENT
Start: 2019-08-27 | End: 2019-08-28 | Stop reason: HOSPADM

## 2019-08-27 RX ADMIN — SODIUM CHLORIDE 1000 ML: 9 INJECTION, SOLUTION INTRAVENOUS at 14:15

## 2019-08-28 NOTE — PROGRESS NOTES
Subjective     HISTORY OF PRESENT ILLNESS:     History of Present Illness  ***    Past Medical History, Past Surgical History, Social History, Family History have been reviewed and are without significant changes except as mentioned.    Review of Systems   A comprehensive 14 point review of systems was performed and was negative except as mentioned.    Medications:  The current medication list was reviewed in the EMR    ALLERGIES:    Allergies   Allergen Reactions   • Banana Anaphylaxis   • Eggs Or Egg-Derived Products Hives, Swelling and Angioedema   • Cantaloupe (Diagnostic) Hives   • Mushroom Hives   • Shellfish-Derived Products Hives   • Rizatriptan Rash       Objective      There were no vitals filed for this visit.  No flowsheet data found.    Physical Exam  ***    RECENT LABS:  Hematology WBC   Date Value Ref Range Status   08/13/2019 10.42 3.40 - 10.80 10*3/mm3 Final     RBC   Date Value Ref Range Status   08/13/2019 4.35 3.77 - 5.28 10*6/mm3 Final     Hemoglobin   Date Value Ref Range Status   08/13/2019 11.2 (L) 12.0 - 15.9 g/dL Final     Hematocrit   Date Value Ref Range Status   08/13/2019 36.8 34.0 - 46.6 % Final     Platelets   Date Value Ref Range Status   08/13/2019 360 140 - 450 10*3/mm3 Final              Assessment/Plan   ***                  8/28/2019      CC:

## 2019-09-03 ENCOUNTER — HOSPITAL ENCOUNTER (OUTPATIENT)
Dept: ONCOLOGY | Facility: HOSPITAL | Age: 24
Setting detail: INFUSION SERIES
Discharge: HOME | End: 2019-09-03

## 2019-09-03 VITALS
HEIGHT: 71 IN | DIASTOLIC BLOOD PRESSURE: 60 MMHG | BODY MASS INDEX: 41.02 KG/M2 | WEIGHT: 293 LBS | HEART RATE: 77 BPM | RESPIRATION RATE: 16 BRPM | SYSTOLIC BLOOD PRESSURE: 132 MMHG | TEMPERATURE: 98 F

## 2019-09-03 DIAGNOSIS — R10.30 PREGNANCY RELATED BILATERAL LOWER ABDOMINAL PAIN, ANTEPARTUM: Primary | ICD-10-CM

## 2019-09-03 DIAGNOSIS — O26.899 PREGNANCY RELATED BILATERAL LOWER ABDOMINAL PAIN, ANTEPARTUM: Primary | ICD-10-CM

## 2019-09-03 PROCEDURE — 96361 HYDRATE IV INFUSION ADD-ON: CPT

## 2019-09-03 PROCEDURE — 96360 HYDRATION IV INFUSION INIT: CPT

## 2019-09-03 RX ORDER — SODIUM CHLORIDE 9 MG/ML
1500 INJECTION, SOLUTION INTRAVENOUS AS NEEDED
Status: DISCONTINUED | OUTPATIENT
Start: 2019-09-03 | End: 2019-09-04 | Stop reason: HOSPADM

## 2019-09-03 RX ORDER — SODIUM CHLORIDE 9 MG/ML
1500 INJECTION, SOLUTION INTRAVENOUS AS NEEDED
Status: CANCELLED | OUTPATIENT
Start: 2019-09-03

## 2019-09-03 RX ADMIN — SODIUM CHLORIDE 1500 ML: 9 INJECTION, SOLUTION INTRAVENOUS at 14:31

## 2019-09-10 ENCOUNTER — HOSPITAL ENCOUNTER (OUTPATIENT)
Dept: ONCOLOGY | Facility: HOSPITAL | Age: 24
Setting detail: INFUSION SERIES
Discharge: HOME | End: 2019-09-10

## 2019-09-10 VITALS
HEIGHT: 71 IN | RESPIRATION RATE: 20 BRPM | DIASTOLIC BLOOD PRESSURE: 70 MMHG | HEART RATE: 67 BPM | TEMPERATURE: 97.9 F | SYSTOLIC BLOOD PRESSURE: 106 MMHG | BODY MASS INDEX: 41.02 KG/M2 | WEIGHT: 293 LBS

## 2019-09-10 DIAGNOSIS — O26.899 PREGNANCY RELATED BILATERAL LOWER ABDOMINAL PAIN, ANTEPARTUM: Primary | ICD-10-CM

## 2019-09-10 DIAGNOSIS — R10.30 PREGNANCY RELATED BILATERAL LOWER ABDOMINAL PAIN, ANTEPARTUM: Primary | ICD-10-CM

## 2019-09-10 PROCEDURE — 96360 HYDRATION IV INFUSION INIT: CPT

## 2019-09-10 PROCEDURE — 96361 HYDRATE IV INFUSION ADD-ON: CPT

## 2019-09-10 RX ORDER — SODIUM CHLORIDE 9 MG/ML
1500 INJECTION, SOLUTION INTRAVENOUS AS NEEDED
Status: DISCONTINUED | OUTPATIENT
Start: 2019-09-10 | End: 2019-09-11 | Stop reason: HOSPADM

## 2019-09-10 RX ORDER — SODIUM CHLORIDE 9 MG/ML
1500 INJECTION, SOLUTION INTRAVENOUS AS NEEDED
Status: CANCELLED | OUTPATIENT
Start: 2019-09-10

## 2019-09-10 RX ADMIN — SODIUM CHLORIDE 1500 ML: 9 INJECTION, SOLUTION INTRAVENOUS at 14:13

## 2019-09-16 ENCOUNTER — HOSPITAL ENCOUNTER (OUTPATIENT)
Facility: HOSPITAL | Age: 24
Discharge: HOME | End: 2019-09-16
Attending: OBSTETRICS & GYNECOLOGY | Admitting: OBSTETRICS & GYNECOLOGY

## 2019-09-16 VITALS
TEMPERATURE: 98.9 F | OXYGEN SATURATION: 97 % | SYSTOLIC BLOOD PRESSURE: 113 MMHG | WEIGHT: 293 LBS | HEIGHT: 71 IN | DIASTOLIC BLOOD PRESSURE: 68 MMHG | RESPIRATION RATE: 20 BRPM | BODY MASS INDEX: 41.02 KG/M2

## 2019-09-16 PROBLEM — O47.9 FALSE LABOR: Status: ACTIVE | Noted: 2019-09-16

## 2019-09-16 LAB
BACTERIA UR QL AUTO: ABNORMAL /HPF
BILIRUB UR QL STRIP: NEGATIVE
CLARITY UR: ABNORMAL
COLOR UR: YELLOW
GLUCOSE UR STRIP-MCNC: NEGATIVE MG/DL
HGB UR QL STRIP.AUTO: NEGATIVE
HYALINE CASTS UR QL AUTO: ABNORMAL /LPF
KETONES UR QL STRIP: NEGATIVE
LEUKOCYTE ESTERASE UR QL STRIP.AUTO: ABNORMAL
MUCOUS THREADS URNS QL MICRO: ABNORMAL /HPF
NITRITE UR QL STRIP: NEGATIVE
PH UR STRIP.AUTO: 6 [PH] (ref 5–8)
PROT UR QL STRIP: ABNORMAL
RBC # UR: ABNORMAL /HPF
REF LAB TEST METHOD: ABNORMAL
SP GR UR STRIP: >=1.03 (ref 1–1.03)
SQUAMOUS #/AREA URNS HPF: ABNORMAL /HPF
UROBILINOGEN UR QL STRIP: ABNORMAL
WBC UR QL AUTO: ABNORMAL /HPF

## 2019-09-16 PROCEDURE — 81001 URINALYSIS AUTO W/SCOPE: CPT | Performed by: OBSTETRICS & GYNECOLOGY

## 2019-09-16 PROCEDURE — 87086 URINE CULTURE/COLONY COUNT: CPT | Performed by: OBSTETRICS & GYNECOLOGY

## 2019-09-16 PROCEDURE — G0463 HOSPITAL OUTPT CLINIC VISIT: HCPCS

## 2019-09-16 PROCEDURE — 59025 FETAL NON-STRESS TEST: CPT

## 2019-09-16 RX ORDER — DEXTROSE, SODIUM CHLORIDE, SODIUM LACTATE, POTASSIUM CHLORIDE, AND CALCIUM CHLORIDE 5; .6; .31; .03; .02 G/100ML; G/100ML; G/100ML; G/100ML; G/100ML
500 INJECTION, SOLUTION INTRAVENOUS CONTINUOUS
Status: ACTIVE | OUTPATIENT
Start: 2019-09-16 | End: 2019-09-16

## 2019-09-16 RX ADMIN — SODIUM CHLORIDE, SODIUM LACTATE, POTASSIUM CHLORIDE, CALCIUM CHLORIDE AND DEXTROSE MONOHYDRATE 500 ML/HR: 5; 600; 310; 30; 20 INJECTION, SOLUTION INTRAVENOUS at 21:50

## 2019-09-17 ENCOUNTER — APPOINTMENT (OUTPATIENT)
Dept: ONCOLOGY | Facility: HOSPITAL | Age: 24
End: 2019-09-17

## 2019-09-17 LAB — BACTERIA SPEC AEROBE CULT: NO GROWTH

## 2019-09-17 NOTE — DISCHARGE SUMMARY
24-year-old G2, P0 Ec1 at 32 weeks and 6 days  She is having increased pain this evening in her lower abdomen  The pain comes and goes every 5 minutes or so, she is not sure if it is contractions  Good fetal movement    Patient has multiple medical problems; chronic pain syndrome, migraines, fibromyalgia,  Asthma, POTS    Patient gets hydration once a week for her pots syndrome    Her UA today is equivocal with WBCs and epithelial cells, specific gravity is greater than 1.030    Fetal heart tones appear reactive    A/P rule out false labor, possible dehydration, possible UTI culture sent    Chart and prenatal record reviewed    Addendum: Patient received 1 liter D5LR; she feels much better after hydration;  Cervix is unchanged; fetal heart tones stable    DC home, keep  next appointment at office; follow-up the urine culture

## 2019-09-17 NOTE — PROGRESS NOTES
24-year-old G2, P0 Ec1 at 32 weeks and 6 days  She is having increased pain this evening in her lower abdomen  The pain comes and goes every 5 minutes or so, she is not sure if it is contractions  Good fetal movement    Patient has multiple medical problems; chronic pain syndrome, migraines, fibromyalgia,  Asthma, POTS    Patient gets hydration once a week for her pots syndrome    Her UA today is equivocal with WBCs and epithelial cells, specific gravity is greater than 1.030    Fetal heart tones appear reactive    A/P rule out false labor, possible dehydration, possible UTI culture sent    Chart and prenatal record reviewed

## 2019-09-24 ENCOUNTER — HOSPITAL ENCOUNTER (OUTPATIENT)
Dept: ONCOLOGY | Facility: HOSPITAL | Age: 24
Setting detail: INFUSION SERIES
Discharge: HOME | End: 2019-09-24

## 2019-09-24 VITALS
WEIGHT: 293 LBS | DIASTOLIC BLOOD PRESSURE: 76 MMHG | BODY MASS INDEX: 41.02 KG/M2 | HEIGHT: 71 IN | RESPIRATION RATE: 18 BRPM | TEMPERATURE: 97.8 F | HEART RATE: 77 BPM | SYSTOLIC BLOOD PRESSURE: 124 MMHG

## 2019-09-24 DIAGNOSIS — R10.30 PREGNANCY RELATED BILATERAL LOWER ABDOMINAL PAIN, ANTEPARTUM: Primary | ICD-10-CM

## 2019-09-24 DIAGNOSIS — O26.899 PREGNANCY RELATED BILATERAL LOWER ABDOMINAL PAIN, ANTEPARTUM: Primary | ICD-10-CM

## 2019-09-24 PROCEDURE — 96361 HYDRATE IV INFUSION ADD-ON: CPT

## 2019-09-24 PROCEDURE — 96360 HYDRATION IV INFUSION INIT: CPT

## 2019-09-24 RX ORDER — SODIUM CHLORIDE 9 MG/ML
1500 INJECTION, SOLUTION INTRAVENOUS AS NEEDED
Status: CANCELLED | OUTPATIENT
Start: 2019-09-24

## 2019-09-24 RX ORDER — SODIUM CHLORIDE 9 MG/ML
1500 INJECTION, SOLUTION INTRAVENOUS AS NEEDED
Status: DISCONTINUED | OUTPATIENT
Start: 2019-09-24 | End: 2019-09-25 | Stop reason: HOSPADM

## 2019-09-24 RX ADMIN — SODIUM CHLORIDE 1500 ML: 9 INJECTION, SOLUTION INTRAVENOUS at 14:21

## 2019-10-01 ENCOUNTER — HOSPITAL ENCOUNTER (OUTPATIENT)
Dept: ONCOLOGY | Facility: HOSPITAL | Age: 24
Setting detail: INFUSION SERIES
Discharge: HOME | End: 2019-10-01

## 2019-10-01 VITALS
HEIGHT: 71 IN | TEMPERATURE: 98 F | WEIGHT: 293 LBS | HEART RATE: 85 BPM | BODY MASS INDEX: 41.02 KG/M2 | RESPIRATION RATE: 16 BRPM | DIASTOLIC BLOOD PRESSURE: 72 MMHG | SYSTOLIC BLOOD PRESSURE: 129 MMHG

## 2019-10-01 DIAGNOSIS — O26.899 PREGNANCY RELATED BILATERAL LOWER ABDOMINAL PAIN, ANTEPARTUM: Primary | ICD-10-CM

## 2019-10-01 DIAGNOSIS — R10.30 PREGNANCY RELATED BILATERAL LOWER ABDOMINAL PAIN, ANTEPARTUM: Primary | ICD-10-CM

## 2019-10-01 PROCEDURE — 96360 HYDRATION IV INFUSION INIT: CPT

## 2019-10-01 PROCEDURE — 96361 HYDRATE IV INFUSION ADD-ON: CPT

## 2019-10-01 RX ORDER — SODIUM CHLORIDE 9 MG/ML
1500 INJECTION, SOLUTION INTRAVENOUS AS NEEDED
Status: CANCELLED | OUTPATIENT
Start: 2019-10-01

## 2019-10-01 RX ORDER — SODIUM CHLORIDE 9 MG/ML
1500 INJECTION, SOLUTION INTRAVENOUS AS NEEDED
Status: DISCONTINUED | OUTPATIENT
Start: 2019-10-01 | End: 2019-10-02 | Stop reason: HOSPADM

## 2019-10-01 RX ADMIN — SODIUM CHLORIDE 1500 ML: 9 INJECTION, SOLUTION INTRAVENOUS at 14:23

## 2019-10-06 ENCOUNTER — HOSPITAL ENCOUNTER (OUTPATIENT)
Facility: HOSPITAL | Age: 24
Setting detail: OBSERVATION
Discharge: HOME | End: 2019-10-06
Attending: OBSTETRICS & GYNECOLOGY | Admitting: OBSTETRICS & GYNECOLOGY

## 2019-10-06 VITALS
WEIGHT: 293 LBS | DIASTOLIC BLOOD PRESSURE: 82 MMHG | SYSTOLIC BLOOD PRESSURE: 118 MMHG | HEART RATE: 82 BPM | RESPIRATION RATE: 18 BRPM | HEIGHT: 71 IN | BODY MASS INDEX: 41.02 KG/M2 | TEMPERATURE: 98 F

## 2019-10-06 PROBLEM — N89.8 VAGINAL DISCHARGE: Status: ACTIVE | Noted: 2019-10-06

## 2019-10-06 LAB
BACTERIA UR QL AUTO: ABNORMAL /HPF
BILIRUB UR QL STRIP: NEGATIVE
CLARITY UR: CLEAR
COLOR UR: YELLOW
GLUCOSE UR STRIP-MCNC: NEGATIVE MG/DL
HGB UR QL STRIP.AUTO: NEGATIVE
HYALINE CASTS UR QL AUTO: ABNORMAL /LPF
KETONES UR QL STRIP: NEGATIVE
LEUKOCYTE ESTERASE UR QL STRIP.AUTO: ABNORMAL
NITRITE UR QL STRIP: NEGATIVE
PH UR STRIP.AUTO: 8 [PH] (ref 5–8)
POC AMNISURE: NEGATIVE
PROT UR QL STRIP: NEGATIVE
RBC # UR: ABNORMAL /HPF
REF LAB TEST METHOD: ABNORMAL
SP GR UR STRIP: 1.01 (ref 1–1.03)
SQUAMOUS #/AREA URNS HPF: ABNORMAL /HPF
UROBILINOGEN UR QL STRIP: ABNORMAL
WBC UR QL AUTO: ABNORMAL /HPF

## 2019-10-06 PROCEDURE — 84112 EVAL AMNIOTIC FLUID PROTEIN: CPT | Performed by: OBSTETRICS & GYNECOLOGY

## 2019-10-06 PROCEDURE — 87086 URINE CULTURE/COLONY COUNT: CPT | Performed by: OBSTETRICS & GYNECOLOGY

## 2019-10-06 PROCEDURE — 59025 FETAL NON-STRESS TEST: CPT

## 2019-10-06 PROCEDURE — 81001 URINALYSIS AUTO W/SCOPE: CPT | Performed by: OBSTETRICS & GYNECOLOGY

## 2019-10-06 PROCEDURE — G0378 HOSPITAL OBSERVATION PER HR: HCPCS

## 2019-10-07 NOTE — H&P
24-year-old G2,  35 weeks and 5 days  Comes in to rule out rupture of membranes  Fetal heart tones are reactive  Cervix is 0-1/60%/-2 station  Patient is not uncomfortable, has had several visits to labor and delivery, is scheduled for another visit with Dr. Ashraf this week.  Amnisure neg ,FU with Dr Radha Call this week  Chart and prenatal record reviewed

## 2019-10-08 ENCOUNTER — HOSPITAL ENCOUNTER (OUTPATIENT)
Dept: ONCOLOGY | Facility: HOSPITAL | Age: 24
Setting detail: INFUSION SERIES
Discharge: HOME | End: 2019-10-08

## 2019-10-08 ENCOUNTER — LAB (OUTPATIENT)
Dept: LAB | Facility: HOSPITAL | Age: 24
End: 2019-10-08

## 2019-10-08 ENCOUNTER — TRANSCRIBE ORDERS (OUTPATIENT)
Dept: LAB | Facility: HOSPITAL | Age: 24
End: 2019-10-08

## 2019-10-08 VITALS
DIASTOLIC BLOOD PRESSURE: 66 MMHG | SYSTOLIC BLOOD PRESSURE: 119 MMHG | TEMPERATURE: 97.7 F | HEIGHT: 71 IN | HEART RATE: 53 BPM | RESPIRATION RATE: 18 BRPM | WEIGHT: 293 LBS | BODY MASS INDEX: 41.02 KG/M2

## 2019-10-08 DIAGNOSIS — Z34.03 ENCOUNTER FOR SUPERVISION OF NORMAL FIRST PREGNANCY IN THIRD TRIMESTER: Primary | ICD-10-CM

## 2019-10-08 DIAGNOSIS — O26.899 PREGNANCY RELATED BILATERAL LOWER ABDOMINAL PAIN, ANTEPARTUM: Primary | ICD-10-CM

## 2019-10-08 DIAGNOSIS — R10.30 PREGNANCY RELATED BILATERAL LOWER ABDOMINAL PAIN, ANTEPARTUM: Primary | ICD-10-CM

## 2019-10-08 DIAGNOSIS — Z34.03 ENCOUNTER FOR SUPERVISION OF NORMAL FIRST PREGNANCY IN THIRD TRIMESTER: ICD-10-CM

## 2019-10-08 LAB — BACTERIA SPEC AEROBE CULT: NORMAL

## 2019-10-08 PROCEDURE — 96360 HYDRATION IV INFUSION INIT: CPT

## 2019-10-08 PROCEDURE — 87081 CULTURE SCREEN ONLY: CPT

## 2019-10-08 PROCEDURE — 96361 HYDRATE IV INFUSION ADD-ON: CPT

## 2019-10-08 RX ORDER — SODIUM CHLORIDE 9 MG/ML
1500 INJECTION, SOLUTION INTRAVENOUS AS NEEDED
Status: CANCELLED | OUTPATIENT
Start: 2019-10-08

## 2019-10-08 RX ORDER — SODIUM CHLORIDE 9 MG/ML
1500 INJECTION, SOLUTION INTRAVENOUS AS NEEDED
Status: DISCONTINUED | OUTPATIENT
Start: 2019-10-08 | End: 2019-10-09 | Stop reason: HOSPADM

## 2019-10-08 RX ADMIN — SODIUM CHLORIDE 1500 ML: 9 INJECTION, SOLUTION INTRAVENOUS at 14:35

## 2019-10-12 LAB — BACTERIA SPEC AEROBE CULT: NORMAL

## 2019-10-15 ENCOUNTER — HOSPITAL ENCOUNTER (OUTPATIENT)
Dept: ONCOLOGY | Facility: HOSPITAL | Age: 24
Setting detail: INFUSION SERIES
Discharge: HOME | End: 2019-10-15

## 2019-10-15 VITALS
TEMPERATURE: 97.1 F | DIASTOLIC BLOOD PRESSURE: 66 MMHG | HEIGHT: 71 IN | BODY MASS INDEX: 41.02 KG/M2 | WEIGHT: 293 LBS | RESPIRATION RATE: 20 BRPM | HEART RATE: 83 BPM | SYSTOLIC BLOOD PRESSURE: 132 MMHG

## 2019-10-15 DIAGNOSIS — R10.30 PREGNANCY RELATED BILATERAL LOWER ABDOMINAL PAIN, ANTEPARTUM: Primary | ICD-10-CM

## 2019-10-15 DIAGNOSIS — O26.899 PREGNANCY RELATED BILATERAL LOWER ABDOMINAL PAIN, ANTEPARTUM: Primary | ICD-10-CM

## 2019-10-15 PROCEDURE — 96361 HYDRATE IV INFUSION ADD-ON: CPT

## 2019-10-15 PROCEDURE — 96360 HYDRATION IV INFUSION INIT: CPT

## 2019-10-15 RX ORDER — SODIUM CHLORIDE 9 MG/ML
1500 INJECTION, SOLUTION INTRAVENOUS AS NEEDED
Status: CANCELLED | OUTPATIENT
Start: 2019-10-15

## 2019-10-15 RX ORDER — SODIUM CHLORIDE 9 MG/ML
1500 INJECTION, SOLUTION INTRAVENOUS AS NEEDED
Status: DISCONTINUED | OUTPATIENT
Start: 2019-10-15 | End: 2019-10-17 | Stop reason: HOSPADM

## 2019-10-15 RX ADMIN — SODIUM CHLORIDE 1500 ML: 9 INJECTION, SOLUTION INTRAVENOUS at 14:18

## 2019-10-20 ENCOUNTER — HOSPITAL ENCOUNTER (OUTPATIENT)
Facility: HOSPITAL | Age: 24
Discharge: HOME | End: 2019-10-20
Attending: OBSTETRICS & GYNECOLOGY | Admitting: OBSTETRICS & GYNECOLOGY

## 2019-10-20 VITALS — HEART RATE: 82 BPM | TEMPERATURE: 98.6 F | RESPIRATION RATE: 20 BRPM

## 2019-10-20 PROCEDURE — 84112 EVAL AMNIOTIC FLUID PROTEIN: CPT | Performed by: OBSTETRICS & GYNECOLOGY

## 2019-10-20 PROCEDURE — 99218 PR INITIAL OBSERVATION CARE/DAY 30 MINUTES: CPT | Performed by: OBSTETRICS & GYNECOLOGY

## 2019-10-20 PROCEDURE — G0463 HOSPITAL OUTPT CLINIC VISIT: HCPCS

## 2019-10-20 RX ORDER — ACETAMINOPHEN 500 MG
500 TABLET ORAL EVERY 6 HOURS PRN
COMMUNITY
End: 2022-08-10

## 2019-10-21 LAB — POC AMNISURE: NEGATIVE

## 2019-10-21 PROCEDURE — 84112 EVAL AMNIOTIC FLUID PROTEIN: CPT | Performed by: OBSTETRICS & GYNECOLOGY

## 2019-10-21 NOTE — NURSING NOTE
Spoke to patient. Provided options as discussed with Dr. Call. Pt states she will allow Dr. Banegas to evaluate her as she does not wish to wait an indefinite amount to time to be seen by Dr. Call.

## 2019-10-21 NOTE — H&P
Ammon LYMAN Sam  1995  [id number]  [id number]    CC: leaking fluid  HPI:  Patient is 24 y.o. female   currently at 37w5d presents with c/o ?SROM.  3 gushes.  Occurred shortly after intercourse, denies assoc bleeding.  Pt is having irregular uterine contractions, radiates to back, rates 4/10.  Good FM.  BPNC to date    PMH:  Current meds: Northera 500mg tid, flutocortisol 10mg bid, bisoprolol, Fe, PNV  Illnesses: POTS, fibromyalgia, autonomics failure, chron regional pain syndrome, hx MRSA, migraines  Surgeries: T and A, lap lowell, ear tubes, D and C  Allergies: maxalt- face swells       Shellfish- hives, throat swells       Eggs- hives, throat swells       bananas    Past OB History:       Obstetric History       T0      L0     SAB0   TAB0   Ectopic1   Molar0   Multiple0   Live Births0       # Outcome Date GA Lbr Chi/2nd Weight Sex Delivery Anes PTL Lv   2 Current            1 Ectopic 18 7w0d    ECTOPIC         Obstetric Comments   FOB #1-G1,2            SH: tob neg , EtOH neg, drugs neg  FH: heart dz pos , diabetes pos , cancer pos    General ROS: contractions, N, D.   All other systems reviewed and are negative.      Physical Examination: General appearance - alert, well appearing, and in no distress  Vital signs - Pulse 82   Temp 98.6 °F (37 °C) (Oral)   Resp 20   LMP 2019 (Approximate)   HEENT: normocephalic, atraumatic,oropharynx clear, appearance of ears and nose normal  Neck - supple, no significant adenopathy, no thyromegaly  Lymphatics - no palpable lymphadenopathy in the neck or groin, no hepatosplenomegaly  Chest - clear to auscultation, no wheezes, rales or rhonchi, respiratory effort non-labored  Heart - normal rate, regular rhythm, no murmurs, rubs, clicks or gallops, no JVD, trace lower extremity edema  Abdomen - soft, nontender, nondistended, no masses, no hepatosplenomegaly  no rebound tenderness noted, bowel sounds normal  Vaginal Exam: 1cm, no blood in  vault, amnisure negative ,external genitalia normal  Extremities - trace pedal edema noted, no calf tend  Skin -warm and dry, normal coloration and turgor, no rashes, no suspicious skin lesions noted      Fetal monitoring: indication contractions , onset 2044 , offset 2128 , baseline 130-140 , mod BTB variability , multiple accels (15 X 15), no decels, occas contractions, interpretation reactive NST    Radiology     Assessment 1)IUP 37 5/7 weeks   2)false labor   3)multiple medical problems (see above)    Plan 1)observe   2)home    3)keep next sched appt    Soren Banegas MD  10/20/2019  11:04 PM

## 2019-10-21 NOTE — NURSING NOTE
Spoke to Dr. Call. MD made aware pt here with contractions. VE done 1/50-60/-2. Pt stated after vag exam that she thought she might be ruptured so now unable to use nitrazine or amnisure since KY jelly used with vag exam. Goldy Q4-7 min.  Pt previously requested not to see Dr. Banegas who is laborist cameron. Dr. Call states she will not be in until other patient is ready to delivery so she can evaluate her then or the patient may elect to have Dr. Banegas evaluate her, or the patient may leave if she does not wish to wait for evaluation.

## 2019-10-22 ENCOUNTER — HOSPITAL ENCOUNTER (OUTPATIENT)
Dept: ONCOLOGY | Facility: HOSPITAL | Age: 24
Setting detail: INFUSION SERIES
Discharge: HOME | End: 2019-10-22

## 2019-10-22 VITALS
DIASTOLIC BLOOD PRESSURE: 78 MMHG | HEIGHT: 71 IN | RESPIRATION RATE: 18 BRPM | HEART RATE: 77 BPM | BODY MASS INDEX: 41.02 KG/M2 | WEIGHT: 293 LBS | TEMPERATURE: 97.7 F | SYSTOLIC BLOOD PRESSURE: 129 MMHG

## 2019-10-22 DIAGNOSIS — O26.899 PREGNANCY RELATED BILATERAL LOWER ABDOMINAL PAIN, ANTEPARTUM: Primary | ICD-10-CM

## 2019-10-22 DIAGNOSIS — R10.30 PREGNANCY RELATED BILATERAL LOWER ABDOMINAL PAIN, ANTEPARTUM: Primary | ICD-10-CM

## 2019-10-22 PROCEDURE — 96361 HYDRATE IV INFUSION ADD-ON: CPT

## 2019-10-22 PROCEDURE — 96360 HYDRATION IV INFUSION INIT: CPT

## 2019-10-22 RX ORDER — SODIUM CHLORIDE 9 MG/ML
1500 INJECTION, SOLUTION INTRAVENOUS AS NEEDED
Status: CANCELLED | OUTPATIENT
Start: 2019-10-22

## 2019-10-22 RX ORDER — SODIUM CHLORIDE 9 MG/ML
1500 INJECTION, SOLUTION INTRAVENOUS AS NEEDED
Status: DISCONTINUED | OUTPATIENT
Start: 2019-10-22 | End: 2019-10-23 | Stop reason: HOSPADM

## 2019-10-22 RX ADMIN — SODIUM CHLORIDE 1500 ML: 9 INJECTION, SOLUTION INTRAVENOUS at 14:15

## 2019-10-23 ENCOUNTER — HOSPITAL ENCOUNTER (OUTPATIENT)
Facility: HOSPITAL | Age: 24
Discharge: HOME | End: 2019-10-23
Attending: OBSTETRICS & GYNECOLOGY | Admitting: OBSTETRICS & GYNECOLOGY

## 2019-10-23 VITALS
SYSTOLIC BLOOD PRESSURE: 133 MMHG | OXYGEN SATURATION: 98 % | RESPIRATION RATE: 20 BRPM | HEART RATE: 69 BPM | TEMPERATURE: 98.2 F | DIASTOLIC BLOOD PRESSURE: 85 MMHG

## 2019-10-23 PROCEDURE — G0463 HOSPITAL OUTPT CLINIC VISIT: HCPCS

## 2019-10-23 PROCEDURE — 59025 FETAL NON-STRESS TEST: CPT

## 2019-10-23 PROCEDURE — 99213 OFFICE O/P EST LOW 20 MIN: CPT | Performed by: OBSTETRICS & GYNECOLOGY

## 2019-10-24 NOTE — H&P
[facility]  Obstetric History and Physical    Chief Complaint   Patient presents with   • Contractions       Subjective     Patient is a 24 y.o. female  currently at 38w1d, who presents with contractions.  They started earlier this afternoon, but spaced out and are less intense since arrival.  She was 1-2 cm on admission and after 2 hours unchanged.  Denies leaking or vaginal bleeding.  +FM.  Requesting to go home.         The following portions of the patients history were reviewed and updated as appropriate: current medications, allergies, past medical history, past surgical history, past family history, past social history and problem list .       Prenatal Information:   Prenatal Labs  Lab Results   Component Value Date    HEPBSAG Non-Reactive 2019    ABO O 2019    RH Positive 2019    ABSCRN Negative 2019    IXVPZJS75 NonReactive 2015    HEPCVIRUSABY Non-Reactive 2019         External Prenatal Results     Pregnancy Outside Results - Transcribed From Office Records - See Scanned Records For Details     Test Value Date Time    Hgb 11.2 g/dL 19 1148    Hct 36.8 % 19 1148    ABO O  19 0450    Rh Positive  19 0450    Antibody Screen Negative  19 1148    Glucose Fasting GTT       Glucose Tolerance Test 1 hour       Glucose Tolerance Test 3 hour       Gonorrhea (discrete) Negative  18 1530    Chlamydia (discrete) Negative  18 1530    RPR Non-Reactive  19 1511    VDRL       Syphilis Antibody       Rubella 7.9 IU/mL 19 1511      Equivocal  19 1511    HBsAg Non-Reactive  19 1511    Herpes Simplex Virus PCR       Herpes Simplex VIrus Culture       HIV Non-Reactive  19 1511    Hep C RNA Quant PCR       Hep C Antibody Non-Reactive  19 1511    AFP       Group B Strep No Group B Streptococcus isolated  10/08/19 1823    GBS Susceptibility to Clindamycin       GBS Susceptibility to Erythromycin       Fetal  Fibronectin       Genetic Testing, Maternal Blood             Drug Screening     Test Value Date Time    Urine Drug Screen       Amphetamine Screen Negative ng/mL 05/31/15 1350    Barbiturate Screen Positive ng/mL 05/31/15 1350    Benzodiazepine Screen Positive ng/mL 05/31/15 1350    Methadone Screen Negative ng/mL 05/31/15 1350    Phencyclidine Screen Negative ng/mL 05/31/15 1350    Opiates Screen       THC Screen       Cocaine Screen       Propoxyphene Screen Negative ng/mL 05/31/15 1350    Buprenorphine Screen Negative ng/mL 05/31/15 1350    Methamphetamine Screen       Oxycodone Screen Negative ng/mL 05/31/15 1350    Tricyclic Antidepressants Screen                     Past OB History:       Obstetric History       T0      L0     SAB0   TAB0   Ectopic1   Molar0   Multiple0   Live Births0       # Outcome Date GA Lbr Chi/2nd Weight Sex Delivery Anes PTL Lv   2 Current            1 Ectopic 18 7w0d    ECTOPIC         Obstetric Comments   FOB #1-G1,2       Past Medical History: Past Medical History:   Diagnosis Date   • Asthma    • Autonomic failure    • CRPS (complex regional pain syndrome type I)    • Fibromyalgia    • Fibromyalgia    • Migraine    • Migraines    • MRSA carrier    • POTS (postural orthostatic tachycardia syndrome)    • POTS (postural orthostatic tachycardia syndrome)    • Pure autonomic failure       Past Surgical History Past Surgical History:   Procedure Laterality Date   • ADENOIDECTOMY     • CHOLECYSTECTOMY     • EAR TUBES     • TONSILLECTOMY AND ADENOIDECTOMY        Family History: Family History   Problem Relation Age of Onset   • Diabetes Mother    • Cancer Maternal Grandmother    • Diabetes Maternal Grandmother    • Kidney disease Maternal Grandmother    • Stroke Maternal Grandmother    • Cancer Maternal Grandfather    • Heart attack Maternal Grandfather    • Hearing loss Maternal Grandfather    • Stroke Maternal Grandfather    • Cancer Paternal Grandfather    • Hearing  loss Paternal Grandfather    • Heart attack Paternal Grandfather    • No Known Problems Father       Social History:  reports that she has never smoked. She has never used smokeless tobacco.   reports that she drinks alcohol.   reports that she does not use drugs.        General ROS: Denies fever, HA, shortness of air, muscle weakness, and rashes    Objective       Vital Signs Range for the last 24 hours  Temperature: Temp:  [98.2 °F (36.8 °C)] 98.2 °F (36.8 °C)   Temp Source: Temp src: Oral   BP: BP: (133)/(85) 133/85   Pulse: Heart Rate:  [69] 69   Respirations: Resp:  [20] 20   SPO2: SpO2:  [97 %-98 %] 98 %   O2 Amount (l/min):     O2 Devices     Weight:       Physical Examination: Alert and oriented X 3  Chest - clear to auscultation, no wheezes, rales or rhonchi, symmetric air entry  Heart - normal rate, S1, S2, no murmurs  Abdomen - no rebound tenderness noted  bowel sounds normal  Gravid uterus, No RUQ pain, No guarding  Extremities - Non-tender bilaterally        Cervix: Exam by:  R.N.    Dilation:  1-2    Effacement:  60   Station:  -2       Fetal Heart Rate Assessment   Method:     Beats/min:     Baseline:  130s   Varibility:  mod   Accels:  y   Decels:  n   Tracing Category:  1     Uterine Assessment   Method:     Frequency (min):  Irregular    Ctx Count in 10 min:     Duration:     Intensity:  mild    Intensity by IUPC:     Resting Tone:     Resting Tone by IUPC:     [place] Units:                   Assessment:  1.  Intrauterine pregnancy at 38w1d weeks gestation with reactive fetal status.    2.  False labour not ruptured    Plan:  1. FHTs  Reactive NST  2. No cervical change or signs and symptoms of SROM or labour  3. Reviewed labour guidelines  4. All questions have been answered.  5. Discharge home with routine OB follow-up      Neo Garcia MD  10/23/2019  9:45 PM

## 2019-10-29 ENCOUNTER — HOSPITAL ENCOUNTER (OUTPATIENT)
Dept: ONCOLOGY | Facility: HOSPITAL | Age: 24
Setting detail: INFUSION SERIES
Discharge: HOME | End: 2019-10-29

## 2019-10-29 VITALS
SYSTOLIC BLOOD PRESSURE: 128 MMHG | DIASTOLIC BLOOD PRESSURE: 76 MMHG | BODY MASS INDEX: 41.02 KG/M2 | TEMPERATURE: 97.9 F | HEART RATE: 73 BPM | RESPIRATION RATE: 18 BRPM | HEIGHT: 71 IN | WEIGHT: 293 LBS

## 2019-10-29 DIAGNOSIS — R10.30 PREGNANCY RELATED BILATERAL LOWER ABDOMINAL PAIN, ANTEPARTUM: Primary | ICD-10-CM

## 2019-10-29 DIAGNOSIS — O26.899 PREGNANCY RELATED BILATERAL LOWER ABDOMINAL PAIN, ANTEPARTUM: Primary | ICD-10-CM

## 2019-10-29 PROCEDURE — 96361 HYDRATE IV INFUSION ADD-ON: CPT

## 2019-10-29 PROCEDURE — 96360 HYDRATION IV INFUSION INIT: CPT

## 2019-10-29 RX ORDER — SODIUM CHLORIDE 9 MG/ML
1500 INJECTION, SOLUTION INTRAVENOUS AS NEEDED
Status: CANCELLED | OUTPATIENT
Start: 2019-10-29

## 2019-10-29 RX ORDER — SODIUM CHLORIDE 9 MG/ML
1500 INJECTION, SOLUTION INTRAVENOUS AS NEEDED
Status: DISCONTINUED | OUTPATIENT
Start: 2019-10-29 | End: 2019-10-30 | Stop reason: HOSPADM

## 2019-10-29 RX ADMIN — SODIUM CHLORIDE 1500 ML: 9 INJECTION, SOLUTION INTRAVENOUS at 14:15

## 2019-10-30 ENCOUNTER — PREP FOR SURGERY (OUTPATIENT)
Dept: OTHER | Facility: HOSPITAL | Age: 24
End: 2019-10-30

## 2019-10-30 ENCOUNTER — HOSPITAL ENCOUNTER (INPATIENT)
Dept: LABOR AND DELIVERY | Facility: HOSPITAL | Age: 24
LOS: 2 days | Discharge: HOME | End: 2019-11-02
Attending: OBSTETRICS & GYNECOLOGY | Admitting: OBSTETRICS & GYNECOLOGY

## 2019-10-30 DIAGNOSIS — Z3A.39 39 WEEKS GESTATION OF PREGNANCY: Primary | ICD-10-CM

## 2019-10-30 DIAGNOSIS — Z3A.39 39 WEEKS GESTATION OF PREGNANCY: ICD-10-CM

## 2019-10-30 LAB
DEPRECATED RDW RBC AUTO: 45.7 FL (ref 37–54)
ERYTHROCYTE [DISTWIDTH] IN BLOOD BY AUTOMATED COUNT: 15.5 % (ref 12.3–15.4)
HCT VFR BLD AUTO: 33.6 % (ref 34–46.6)
HGB BLD-MCNC: 10.6 G/DL (ref 12–15.9)
MCH RBC QN AUTO: 25.7 PG (ref 26.6–33)
MCHC RBC AUTO-ENTMCNC: 31.5 G/DL (ref 31.5–35.7)
MCV RBC AUTO: 81.6 FL (ref 79–97)
PLATELET # BLD AUTO: 290 10*3/MM3 (ref 140–450)
PMV BLD AUTO: 10.2 FL (ref 6–12)
RBC # BLD AUTO: 4.12 10*6/MM3 (ref 3.77–5.28)
WBC NRBC COR # BLD: 12.5 10*3/MM3 (ref 3.4–10.8)

## 2019-10-30 PROCEDURE — 85027 COMPLETE CBC AUTOMATED: CPT | Performed by: OBSTETRICS & GYNECOLOGY

## 2019-10-30 PROCEDURE — 36415 COLL VENOUS BLD VENIPUNCTURE: CPT | Performed by: OBSTETRICS & GYNECOLOGY

## 2019-10-30 RX ORDER — LIDOCAINE HYDROCHLORIDE 10 MG/ML
5 INJECTION, SOLUTION EPIDURAL; INFILTRATION; INTRACAUDAL; PERINEURAL AS NEEDED
Status: CANCELLED | OUTPATIENT
Start: 2019-10-30

## 2019-10-30 RX ORDER — OXYTOCIN-SODIUM CHLORIDE 0.9% IV SOLN 30 UNIT/500ML 30-0.9/5 UT/ML-%
85 SOLUTION INTRAVENOUS ONCE
Status: CANCELLED | OUTPATIENT
Start: 2019-10-30 | End: 2019-10-30

## 2019-10-30 RX ORDER — MAGNESIUM CARB/ALUMINUM HYDROX 105-160MG
30 TABLET,CHEWABLE ORAL ONCE
Status: CANCELLED | OUTPATIENT
Start: 2019-10-30 | End: 2019-10-30

## 2019-10-30 RX ORDER — OXYTOCIN-SODIUM CHLORIDE 0.9% IV SOLN 30 UNIT/500ML 30-0.9/5 UT/ML-%
650 SOLUTION INTRAVENOUS ONCE
Status: CANCELLED | OUTPATIENT
Start: 2019-10-30 | End: 2019-10-30

## 2019-10-30 RX ORDER — MORPHINE SULFATE 2 MG/ML
2 INJECTION, SOLUTION INTRAMUSCULAR; INTRAVENOUS
Status: CANCELLED | OUTPATIENT
Start: 2019-10-30 | End: 2019-11-09

## 2019-10-30 RX ORDER — MAGNESIUM CARB/ALUMINUM HYDROX 105-160MG
30 TABLET,CHEWABLE ORAL ONCE
Status: DISCONTINUED | OUTPATIENT
Start: 2019-10-30 | End: 2019-10-31

## 2019-10-30 RX ORDER — MISOPROSTOL 200 UG/1
800 TABLET ORAL AS NEEDED
Status: CANCELLED | OUTPATIENT
Start: 2019-10-30

## 2019-10-30 RX ORDER — PROMETHAZINE HYDROCHLORIDE 25 MG/ML
12.5 INJECTION, SOLUTION INTRAMUSCULAR; INTRAVENOUS EVERY 6 HOURS PRN
Status: CANCELLED | OUTPATIENT
Start: 2019-10-30

## 2019-10-30 RX ORDER — SODIUM CHLORIDE, SODIUM LACTATE, POTASSIUM CHLORIDE, CALCIUM CHLORIDE 600; 310; 30; 20 MG/100ML; MG/100ML; MG/100ML; MG/100ML
125 INJECTION, SOLUTION INTRAVENOUS CONTINUOUS
Status: DISCONTINUED | OUTPATIENT
Start: 2019-10-30 | End: 2019-10-31

## 2019-10-30 RX ORDER — ACETAMINOPHEN 325 MG/1
650 TABLET ORAL EVERY 4 HOURS PRN
Status: CANCELLED | OUTPATIENT
Start: 2019-10-30

## 2019-10-30 RX ORDER — OXYTOCIN-SODIUM CHLORIDE 0.9% IV SOLN 30 UNIT/500ML 30-0.9/5 UT/ML-%
2-24 SOLUTION INTRAVENOUS
Status: DISCONTINUED | OUTPATIENT
Start: 2019-10-31 | End: 2019-10-30

## 2019-10-30 RX ORDER — METHYLERGONOVINE MALEATE 0.2 MG/ML
200 INJECTION INTRAVENOUS ONCE AS NEEDED
Status: CANCELLED | OUTPATIENT
Start: 2019-10-30

## 2019-10-30 RX ORDER — SODIUM CHLORIDE 0.9 % (FLUSH) 0.9 %
10 SYRINGE (ML) INJECTION AS NEEDED
Status: DISCONTINUED | OUTPATIENT
Start: 2019-10-30 | End: 2019-10-31

## 2019-10-30 RX ORDER — SODIUM CHLORIDE 0.9 % (FLUSH) 0.9 %
3 SYRINGE (ML) INJECTION EVERY 12 HOURS SCHEDULED
Status: CANCELLED | OUTPATIENT
Start: 2019-10-30

## 2019-10-30 RX ORDER — SODIUM CHLORIDE 0.9 % (FLUSH) 0.9 %
10 SYRINGE (ML) INJECTION AS NEEDED
Status: CANCELLED | OUTPATIENT
Start: 2019-10-30

## 2019-10-30 RX ORDER — OXYCODONE AND ACETAMINOPHEN 7.5; 325 MG/1; MG/1
2 TABLET ORAL EVERY 4 HOURS PRN
Status: CANCELLED | OUTPATIENT
Start: 2019-10-30 | End: 2019-11-09

## 2019-10-30 RX ORDER — SODIUM CHLORIDE, SODIUM LACTATE, POTASSIUM CHLORIDE, CALCIUM CHLORIDE 600; 310; 30; 20 MG/100ML; MG/100ML; MG/100ML; MG/100ML
125 INJECTION, SOLUTION INTRAVENOUS CONTINUOUS
Status: CANCELLED | OUTPATIENT
Start: 2019-10-30

## 2019-10-30 RX ORDER — PROMETHAZINE HYDROCHLORIDE 12.5 MG/1
12.5 TABLET ORAL EVERY 6 HOURS PRN
Status: CANCELLED | OUTPATIENT
Start: 2019-10-30

## 2019-10-30 RX ORDER — OXYTOCIN-SODIUM CHLORIDE 0.9% IV SOLN 30 UNIT/500ML 30-0.9/5 UT/ML-%
2-24 SOLUTION INTRAVENOUS
Status: CANCELLED | OUTPATIENT
Start: 2019-10-31

## 2019-10-30 RX ORDER — LIDOCAINE HYDROCHLORIDE 10 MG/ML
5 INJECTION, SOLUTION EPIDURAL; INFILTRATION; INTRACAUDAL; PERINEURAL AS NEEDED
Status: DISCONTINUED | OUTPATIENT
Start: 2019-10-30 | End: 2019-10-31

## 2019-10-30 RX ORDER — SODIUM CHLORIDE 0.9 % (FLUSH) 0.9 %
3 SYRINGE (ML) INJECTION EVERY 12 HOURS SCHEDULED
Status: DISCONTINUED | OUTPATIENT
Start: 2019-10-30 | End: 2019-10-31 | Stop reason: HOSPADM

## 2019-10-30 RX ORDER — OXYTOCIN-SODIUM CHLORIDE 0.9% IV SOLN 30 UNIT/500ML 30-0.9/5 UT/ML-%
2-24 SOLUTION INTRAVENOUS
Status: DISCONTINUED | OUTPATIENT
Start: 2019-10-30 | End: 2019-10-31 | Stop reason: HOSPADM

## 2019-10-30 RX ORDER — CARBOPROST TROMETHAMINE 250 UG/ML
250 INJECTION, SOLUTION INTRAMUSCULAR AS NEEDED
Status: CANCELLED | OUTPATIENT
Start: 2019-10-30

## 2019-10-30 RX ORDER — PROMETHAZINE HYDROCHLORIDE 12.5 MG/1
12.5 SUPPOSITORY RECTAL EVERY 6 HOURS PRN
Status: CANCELLED | OUTPATIENT
Start: 2019-10-30

## 2019-10-31 ENCOUNTER — ANESTHESIA EVENT (OUTPATIENT)
Dept: LABOR AND DELIVERY | Facility: HOSPITAL | Age: 24
End: 2019-10-31

## 2019-10-31 ENCOUNTER — ANESTHESIA (OUTPATIENT)
Dept: LABOR AND DELIVERY | Facility: HOSPITAL | Age: 24
End: 2019-10-31

## 2019-10-31 PROBLEM — N89.8 VAGINAL DISCHARGE: Status: RESOLVED | Noted: 2019-10-06 | Resolved: 2019-10-31

## 2019-10-31 PROBLEM — O47.9 FALSE LABOR: Status: RESOLVED | Noted: 2019-09-16 | Resolved: 2019-10-31

## 2019-10-31 PROBLEM — O34.40 UNFAVORABLE CERVIX IN TERM PREGNANCY: Status: ACTIVE | Noted: 2019-10-31

## 2019-10-31 PROBLEM — Z34.90 CURRENTLY PREGNANT: Status: ACTIVE | Noted: 2019-10-31

## 2019-10-31 LAB
ABO GROUP BLD: NORMAL
BLD GP AB SCN SERPL QL: NEGATIVE
RH BLD: POSITIVE
T&S EXPIRATION DATE: NORMAL

## 2019-10-31 PROCEDURE — 25010000002 FENTANYL CITRATE (PF) 100 MCG/2ML SOLUTION: Performed by: ANESTHESIOLOGY

## 2019-10-31 PROCEDURE — 59025 FETAL NON-STRESS TEST: CPT

## 2019-10-31 PROCEDURE — C1755 CATHETER, INTRASPINAL: HCPCS | Performed by: ANESTHESIOLOGY

## 2019-10-31 PROCEDURE — 25010000002 ONDANSETRON PER 1 MG: Performed by: OBSTETRICS & GYNECOLOGY

## 2019-10-31 PROCEDURE — 51702 INSERT TEMP BLADDER CATH: CPT

## 2019-10-31 PROCEDURE — 86900 BLOOD TYPING SEROLOGIC ABO: CPT | Performed by: OBSTETRICS & GYNECOLOGY

## 2019-10-31 PROCEDURE — 86901 BLOOD TYPING SEROLOGIC RH(D): CPT | Performed by: OBSTETRICS & GYNECOLOGY

## 2019-10-31 PROCEDURE — C1755 CATHETER, INTRASPINAL: HCPCS

## 2019-10-31 PROCEDURE — 25010000002 PROMETHAZINE PER 50 MG: Performed by: OBSTETRICS & GYNECOLOGY

## 2019-10-31 PROCEDURE — 86850 RBC ANTIBODY SCREEN: CPT | Performed by: OBSTETRICS & GYNECOLOGY

## 2019-10-31 PROCEDURE — 59200 INSERT CERVICAL DILATOR: CPT | Performed by: OBSTETRICS & GYNECOLOGY

## 2019-10-31 RX ORDER — PROMETHAZINE HYDROCHLORIDE 25 MG/ML
12.5 INJECTION, SOLUTION INTRAMUSCULAR; INTRAVENOUS EVERY 6 HOURS PRN
Status: DISCONTINUED | OUTPATIENT
Start: 2019-10-31 | End: 2019-10-31 | Stop reason: HOSPADM

## 2019-10-31 RX ORDER — BISOPROLOL FUMARATE 5 MG/1
5 TABLET, FILM COATED ORAL 2 TIMES DAILY
Status: DISCONTINUED | OUTPATIENT
Start: 2019-10-31 | End: 2019-11-02 | Stop reason: HOSPADM

## 2019-10-31 RX ORDER — TRISODIUM CITRATE DIHYDRATE AND CITRIC ACID MONOHYDRATE 500; 334 MG/5ML; MG/5ML
30 SOLUTION ORAL ONCE
Status: DISCONTINUED | OUTPATIENT
Start: 2019-10-31 | End: 2019-10-31

## 2019-10-31 RX ORDER — MISOPROSTOL 200 UG/1
800 TABLET ORAL AS NEEDED
Status: DISCONTINUED | OUTPATIENT
Start: 2019-10-31 | End: 2019-10-31 | Stop reason: HOSPADM

## 2019-10-31 RX ORDER — LIDOCAINE HYDROCHLORIDE AND EPINEPHRINE 15; 5 MG/ML; UG/ML
INJECTION, SOLUTION EPIDURAL AS NEEDED
Status: DISCONTINUED | OUTPATIENT
Start: 2019-10-31 | End: 2019-10-31 | Stop reason: SURG

## 2019-10-31 RX ORDER — ACETAMINOPHEN 325 MG/1
650 TABLET ORAL EVERY 4 HOURS PRN
Status: DISCONTINUED | OUTPATIENT
Start: 2019-10-31 | End: 2019-10-31 | Stop reason: HOSPADM

## 2019-10-31 RX ORDER — ONDANSETRON 4 MG/1
4 TABLET, FILM COATED ORAL EVERY 6 HOURS PRN
Status: DISCONTINUED | OUTPATIENT
Start: 2019-10-31 | End: 2019-11-02 | Stop reason: HOSPADM

## 2019-10-31 RX ORDER — LIDOCAINE HYDROCHLORIDE AND EPINEPHRINE 20; 5 MG/ML; UG/ML
INJECTION, SOLUTION EPIDURAL; INFILTRATION; INTRACAUDAL; PERINEURAL AS NEEDED
Status: DISCONTINUED | OUTPATIENT
Start: 2019-10-31 | End: 2019-10-31 | Stop reason: SURG

## 2019-10-31 RX ORDER — DIPHENHYDRAMINE HYDROCHLORIDE 50 MG/ML
12.5 INJECTION INTRAMUSCULAR; INTRAVENOUS EVERY 8 HOURS PRN
Status: DISCONTINUED | OUTPATIENT
Start: 2019-10-31 | End: 2019-10-31 | Stop reason: HOSPADM

## 2019-10-31 RX ORDER — OXYTOCIN-SODIUM CHLORIDE 0.9% IV SOLN 30 UNIT/500ML 30-0.9/5 UT/ML-%
650 SOLUTION INTRAVENOUS ONCE
Status: COMPLETED | OUTPATIENT
Start: 2019-10-31 | End: 2019-10-31

## 2019-10-31 RX ORDER — PROMETHAZINE HYDROCHLORIDE 12.5 MG/1
12.5 TABLET ORAL EVERY 6 HOURS PRN
Status: DISCONTINUED | OUTPATIENT
Start: 2019-10-31 | End: 2019-10-31 | Stop reason: HOSPADM

## 2019-10-31 RX ORDER — PROMETHAZINE HYDROCHLORIDE 25 MG/ML
12.5 INJECTION, SOLUTION INTRAMUSCULAR; INTRAVENOUS EVERY 4 HOURS PRN
Status: DISCONTINUED | OUTPATIENT
Start: 2019-10-31 | End: 2019-11-02 | Stop reason: HOSPADM

## 2019-10-31 RX ORDER — PROMETHAZINE HYDROCHLORIDE 25 MG/ML
12.5 INJECTION, SOLUTION INTRAMUSCULAR; INTRAVENOUS EVERY 6 HOURS PRN
Status: DISCONTINUED | OUTPATIENT
Start: 2019-10-31 | End: 2019-11-02 | Stop reason: HOSPADM

## 2019-10-31 RX ORDER — SODIUM CHLORIDE 0.9 % (FLUSH) 0.9 %
1-10 SYRINGE (ML) INJECTION AS NEEDED
Status: DISCONTINUED | OUTPATIENT
Start: 2019-10-31 | End: 2019-11-02 | Stop reason: HOSPADM

## 2019-10-31 RX ORDER — MORPHINE SULFATE 2 MG/ML
2 INJECTION, SOLUTION INTRAMUSCULAR; INTRAVENOUS
Status: DISCONTINUED | OUTPATIENT
Start: 2019-10-31 | End: 2019-10-31 | Stop reason: HOSPADM

## 2019-10-31 RX ORDER — FAMOTIDINE 10 MG/ML
20 INJECTION, SOLUTION INTRAVENOUS ONCE AS NEEDED
Status: DISCONTINUED | OUTPATIENT
Start: 2019-10-31 | End: 2019-10-31

## 2019-10-31 RX ORDER — ONDANSETRON 2 MG/ML
4 INJECTION INTRAMUSCULAR; INTRAVENOUS ONCE AS NEEDED
Status: DISCONTINUED | OUTPATIENT
Start: 2019-10-31 | End: 2019-10-31

## 2019-10-31 RX ORDER — OXYCODONE AND ACETAMINOPHEN 7.5; 325 MG/1; MG/1
2 TABLET ORAL EVERY 4 HOURS PRN
Status: DISCONTINUED | OUTPATIENT
Start: 2019-10-31 | End: 2019-10-31 | Stop reason: HOSPADM

## 2019-10-31 RX ORDER — IBUPROFEN 600 MG/1
600 TABLET ORAL EVERY 6 HOURS PRN
Status: DISCONTINUED | OUTPATIENT
Start: 2019-10-31 | End: 2019-11-02 | Stop reason: HOSPADM

## 2019-10-31 RX ORDER — FLUDROCORTISONE ACETATE 0.1 MG/1
0.2 TABLET ORAL
Status: DISCONTINUED | OUTPATIENT
Start: 2019-11-01 | End: 2019-11-02 | Stop reason: HOSPADM

## 2019-10-31 RX ORDER — EPHEDRINE SULFATE/0.9% NACL/PF 25 MG/5 ML
10 SYRINGE (ML) INTRAVENOUS
Status: DISCONTINUED | OUTPATIENT
Start: 2019-10-31 | End: 2019-10-31

## 2019-10-31 RX ORDER — ONDANSETRON 2 MG/ML
4 INJECTION INTRAMUSCULAR; INTRAVENOUS EVERY 6 HOURS PRN
Status: DISCONTINUED | OUTPATIENT
Start: 2019-10-31 | End: 2019-11-02 | Stop reason: HOSPADM

## 2019-10-31 RX ORDER — PROMETHAZINE HYDROCHLORIDE 12.5 MG/1
12.5 TABLET ORAL EVERY 4 HOURS PRN
Status: DISCONTINUED | OUTPATIENT
Start: 2019-10-31 | End: 2019-11-02 | Stop reason: HOSPADM

## 2019-10-31 RX ORDER — DOCUSATE SODIUM 100 MG/1
100 CAPSULE, LIQUID FILLED ORAL 2 TIMES DAILY
Status: DISCONTINUED | OUTPATIENT
Start: 2019-10-31 | End: 2019-11-02 | Stop reason: HOSPADM

## 2019-10-31 RX ORDER — METHYLERGONOVINE MALEATE 0.2 MG/ML
200 INJECTION INTRAVENOUS ONCE AS NEEDED
Status: DISCONTINUED | OUTPATIENT
Start: 2019-10-31 | End: 2019-10-31 | Stop reason: HOSPADM

## 2019-10-31 RX ORDER — HYDROCODONE BITARTRATE AND ACETAMINOPHEN 5; 325 MG/1; MG/1
1 TABLET ORAL EVERY 4 HOURS PRN
Status: DISCONTINUED | OUTPATIENT
Start: 2019-10-31 | End: 2019-11-02 | Stop reason: HOSPADM

## 2019-10-31 RX ORDER — DROXIDOPA 300 MG/1
300 CAPSULE ORAL 3 TIMES DAILY
Status: DISCONTINUED | OUTPATIENT
Start: 2019-10-31 | End: 2019-11-02 | Stop reason: HOSPADM

## 2019-10-31 RX ORDER — BISACODYL 10 MG
10 SUPPOSITORY, RECTAL RECTAL DAILY PRN
Status: DISCONTINUED | OUTPATIENT
Start: 2019-11-01 | End: 2019-11-02 | Stop reason: HOSPADM

## 2019-10-31 RX ORDER — FENTANYL CITRATE 50 UG/ML
INJECTION, SOLUTION INTRAMUSCULAR; INTRAVENOUS AS NEEDED
Status: DISCONTINUED | OUTPATIENT
Start: 2019-10-31 | End: 2019-10-31 | Stop reason: SURG

## 2019-10-31 RX ORDER — PROMETHAZINE HYDROCHLORIDE 12.5 MG/1
12.5 SUPPOSITORY RECTAL EVERY 6 HOURS PRN
Status: DISCONTINUED | OUTPATIENT
Start: 2019-10-31 | End: 2019-10-31 | Stop reason: HOSPADM

## 2019-10-31 RX ORDER — CARBOPROST TROMETHAMINE 250 UG/ML
250 INJECTION, SOLUTION INTRAMUSCULAR AS NEEDED
Status: DISCONTINUED | OUTPATIENT
Start: 2019-10-31 | End: 2019-10-31 | Stop reason: HOSPADM

## 2019-10-31 RX ORDER — OXYTOCIN-SODIUM CHLORIDE 0.9% IV SOLN 30 UNIT/500ML 30-0.9/5 UT/ML-%
85 SOLUTION INTRAVENOUS ONCE
Status: COMPLETED | OUTPATIENT
Start: 2019-10-31 | End: 2019-10-31

## 2019-10-31 RX ORDER — DROXIDOPA 200 MG/1
200 CAPSULE ORAL 3 TIMES DAILY
Status: DISCONTINUED | OUTPATIENT
Start: 2019-10-31 | End: 2019-11-02 | Stop reason: HOSPADM

## 2019-10-31 RX ORDER — OXYCODONE HYDROCHLORIDE AND ACETAMINOPHEN 5; 325 MG/1; MG/1
1 TABLET ORAL EVERY 4 HOURS PRN
Status: DISCONTINUED | OUTPATIENT
Start: 2019-10-31 | End: 2019-11-02 | Stop reason: HOSPADM

## 2019-10-31 RX ADMIN — SODIUM CHLORIDE, POTASSIUM CHLORIDE, SODIUM LACTATE AND CALCIUM CHLORIDE 125 ML/HR: 600; 310; 30; 20 INJECTION, SOLUTION INTRAVENOUS at 08:55

## 2019-10-31 RX ADMIN — OXYTOCIN 2 MILLI-UNITS/MIN: 10 INJECTION, SOLUTION INTRAMUSCULAR; INTRAVENOUS at 00:15

## 2019-10-31 RX ADMIN — Medication 16 ML/HR: at 05:52

## 2019-10-31 RX ADMIN — ONDANSETRON 4 MG: 2 INJECTION INTRAMUSCULAR; INTRAVENOUS at 04:21

## 2019-10-31 RX ADMIN — FENTANYL CITRATE 100 MCG: 50 INJECTION, SOLUTION INTRAMUSCULAR; INTRAVENOUS at 05:52

## 2019-10-31 RX ADMIN — LIDOCAINE HYDROCHLORIDE,EPINEPHRINE BITARTRATE 7 ML: 20; .005 INJECTION, SOLUTION EPIDURAL; INFILTRATION; INTRACAUDAL; PERINEURAL at 05:43

## 2019-10-31 RX ADMIN — IBUPROFEN 600 MG: 600 TABLET ORAL at 18:31

## 2019-10-31 RX ADMIN — OXYTOCIN 650 ML/HR: 10 INJECTION, SOLUTION INTRAMUSCULAR; INTRAVENOUS at 13:35

## 2019-10-31 RX ADMIN — WITCH HAZEL 1 PAD: 500 SOLUTION RECTAL; TOPICAL at 16:41

## 2019-10-31 RX ADMIN — OXYTOCIN 85 ML/HR: 10 INJECTION, SOLUTION INTRAMUSCULAR; INTRAVENOUS at 14:18

## 2019-10-31 RX ADMIN — LIDOCAINE HYDROCHLORIDE AND EPINEPHRINE 3 ML: 15; 5 INJECTION, SOLUTION EPIDURAL at 05:46

## 2019-10-31 RX ADMIN — LIDOCAINE HYDROCHLORIDE AND EPINEPHRINE 2 ML: 15; 5 INJECTION, SOLUTION EPIDURAL at 05:47

## 2019-10-31 RX ADMIN — Medication: at 16:41

## 2019-10-31 RX ADMIN — PROMETHAZINE HYDROCHLORIDE 12.5 MG: 25 INJECTION INTRAMUSCULAR; INTRAVENOUS at 05:26

## 2019-11-01 LAB
ALP SERPL-CCNC: 123 U/L (ref 39–117)
ALT SERPL W P-5'-P-CCNC: 6 U/L (ref 1–33)
AST SERPL-CCNC: 15 U/L (ref 1–32)
BASOPHILS # BLD AUTO: 0.03 10*3/MM3 (ref 0–0.2)
BASOPHILS NFR BLD AUTO: 0.2 % (ref 0–1.5)
BILIRUB SERPL-MCNC: 0.2 MG/DL (ref 0.2–1.2)
CREAT BLD-MCNC: 0.43 MG/DL (ref 0.57–1)
DEPRECATED RDW RBC AUTO: 46.7 FL (ref 37–54)
EOSINOPHIL # BLD AUTO: 0.15 10*3/MM3 (ref 0–0.4)
EOSINOPHIL NFR BLD AUTO: 1.1 % (ref 0.3–6.2)
ERYTHROCYTE [DISTWIDTH] IN BLOOD BY AUTOMATED COUNT: 15.7 % (ref 12.3–15.4)
HCT VFR BLD AUTO: 31.7 % (ref 34–46.6)
HGB BLD-MCNC: 9.8 G/DL (ref 12–15.9)
IMM GRANULOCYTES # BLD AUTO: 0.08 10*3/MM3 (ref 0–0.05)
IMM GRANULOCYTES NFR BLD AUTO: 0.6 % (ref 0–0.5)
LDH SERPL-CCNC: 186 U/L (ref 135–214)
LYMPHOCYTES # BLD AUTO: 1.71 10*3/MM3 (ref 0.7–3.1)
LYMPHOCYTES NFR BLD AUTO: 13.1 % (ref 19.6–45.3)
MCH RBC QN AUTO: 25.5 PG (ref 26.6–33)
MCHC RBC AUTO-ENTMCNC: 30.9 G/DL (ref 31.5–35.7)
MCV RBC AUTO: 82.6 FL (ref 79–97)
MONOCYTES # BLD AUTO: 0.7 10*3/MM3 (ref 0.1–0.9)
MONOCYTES NFR BLD AUTO: 5.4 % (ref 5–12)
NEUTROPHILS # BLD AUTO: 10.41 10*3/MM3 (ref 1.7–7)
NEUTROPHILS NFR BLD AUTO: 79.6 % (ref 42.7–76)
NRBC BLD AUTO-RTO: 0 /100 WBC (ref 0–0.2)
PLATELET # BLD AUTO: 240 10*3/MM3 (ref 140–450)
PMV BLD AUTO: 10.5 FL (ref 6–12)
RBC # BLD AUTO: 3.84 10*6/MM3 (ref 3.77–5.28)
URATE SERPL-MCNC: 4.1 MG/DL (ref 2.4–5.7)
WBC NRBC COR # BLD: 13.08 10*3/MM3 (ref 3.4–10.8)

## 2019-11-01 PROCEDURE — 83615 LACTATE (LD) (LDH) ENZYME: CPT | Performed by: NURSE PRACTITIONER

## 2019-11-01 PROCEDURE — 84075 ASSAY ALKALINE PHOSPHATASE: CPT | Performed by: NURSE PRACTITIONER

## 2019-11-01 PROCEDURE — 84450 TRANSFERASE (AST) (SGOT): CPT | Performed by: NURSE PRACTITIONER

## 2019-11-01 PROCEDURE — 85025 COMPLETE CBC W/AUTO DIFF WBC: CPT | Performed by: OBSTETRICS & GYNECOLOGY

## 2019-11-01 PROCEDURE — 84460 ALANINE AMINO (ALT) (SGPT): CPT | Performed by: NURSE PRACTITIONER

## 2019-11-01 PROCEDURE — 82565 ASSAY OF CREATININE: CPT | Performed by: NURSE PRACTITIONER

## 2019-11-01 PROCEDURE — 84550 ASSAY OF BLOOD/URIC ACID: CPT | Performed by: NURSE PRACTITIONER

## 2019-11-01 PROCEDURE — 82247 BILIRUBIN TOTAL: CPT | Performed by: NURSE PRACTITIONER

## 2019-11-01 RX ORDER — PRENATAL VIT/IRON FUM/FOLIC AC 27MG-0.8MG
1 TABLET ORAL DAILY
Status: DISCONTINUED | OUTPATIENT
Start: 2019-11-01 | End: 2019-11-02 | Stop reason: HOSPADM

## 2019-11-01 RX ADMIN — HYDROCODONE BITARTRATE AND ACETAMINOPHEN 1 TABLET: 5; 325 TABLET ORAL at 21:31

## 2019-11-01 RX ADMIN — PRENATAL VITAMINS-IRON FUMARATE 27 MG IRON-FOLIC ACID 0.8 MG TABLET 1 TABLET: at 14:09

## 2019-11-01 RX ADMIN — DOCUSATE SODIUM 100 MG: 100 CAPSULE, LIQUID FILLED ORAL at 08:02

## 2019-11-01 RX ADMIN — HYDROCODONE BITARTRATE AND ACETAMINOPHEN 1 TABLET: 5; 325 TABLET ORAL at 02:59

## 2019-11-01 RX ADMIN — HYDROCODONE BITARTRATE AND ACETAMINOPHEN 1 TABLET: 5; 325 TABLET ORAL at 08:01

## 2019-11-01 RX ADMIN — IBUPROFEN 600 MG: 600 TABLET ORAL at 14:13

## 2019-11-01 RX ADMIN — HYDROCODONE BITARTRATE AND ACETAMINOPHEN 1 TABLET: 5; 325 TABLET ORAL at 12:02

## 2019-11-01 RX ADMIN — HYDROCODONE BITARTRATE AND ACETAMINOPHEN 1 TABLET: 5; 325 TABLET ORAL at 16:00

## 2019-11-01 RX ADMIN — DOCUSATE SODIUM 100 MG: 100 CAPSULE, LIQUID FILLED ORAL at 21:31

## 2019-11-01 RX ADMIN — WITCH HAZEL 1 PAD: 500 SOLUTION RECTAL; TOPICAL at 16:19

## 2019-11-01 RX ADMIN — IBUPROFEN 600 MG: 600 TABLET ORAL at 00:43

## 2019-11-01 RX ADMIN — IBUPROFEN 600 MG: 600 TABLET ORAL at 23:06

## 2019-11-01 RX ADMIN — Medication: at 16:19

## 2019-11-01 NOTE — CONSULTS
Continued Stay Note  [facility]     Patient Name: Ammon Vargas  MRN: [id number]  Today's Date: 11/1/2019    Admit Date: 10/30/2019    Discharge Plan     Row Name 11/01/19 1405       Plan    Plan  MSW available     Plan Comments  Visited pt. Discussed PPD and provided info. Pt reports she is doing well.     Final Discharge Disposition Code  01 - home or self-care        Discharge Codes    No documentation.             Priya Vasquez MSW

## 2019-11-01 NOTE — PROGRESS NOTES
11/1/2019  PPD #1    Subjective   Macayla feels well.  Patient describes her lochia less than menses.  Pain is well controlled  Denies HA, vision changes     Objective   Temp: Temp:  [97.8 °F (36.6 °C)-98.6 °F (37 °C)] 98.6 °F (37 °C) Temp src: Oral   BP: BP: (100-152)/() 127/78        Pulse: Heart Rate:  [56-96] 77  RR: Resp:  [18-20] 18    General:  No acute distress   Abdomen: Fundus firm and beneath umbilicus   Pelvis: deferred     Lab Results   Component Value Date    WBC 13.08 (H) 11/01/2019    HGB 9.8 (L) 11/01/2019    HCT 31.7 (L) 11/01/2019    MCV 82.6 11/01/2019     11/01/2019    HEPBSAG Non-Reactive 03/21/2019       Assessment  1. PPD# 1 after vaginal delivery   2. Elevated BP- sporadic elevation. Now normal range, on bisoprolol for POTS. Asymptomatic    Plan  1. Routine postpartum care.  2. PEP ordered, will continue to monitor BP      This note has been electronically signed.    Rosita Bender, APRN  November 1, 2019

## 2019-11-01 NOTE — ANESTHESIA POSTPROCEDURE EVALUATION
Patient: Ammon Vargas    Procedure Summary     Date:  10/31/19 Room / Location:      Anesthesia Start:  0530 Anesthesia Stop:  1331    Procedure:  LABOR ANALGESIA Diagnosis:      Scheduled Providers:   Provider:  Petr Luna DO    Anesthesia Type:  epidural ASA Status:  3          Anesthesia Type: epidural  Last vitals  BP   135/74 (11/01/19 0800)   Temp   97.6 °F (36.4 °C) (11/01/19 0800)   Pulse   82 (11/01/19 0800)   Resp   18 (11/01/19 0800)     SpO2         Post Anesthesia Care and Evaluation    Patient location during evaluation: bedside  Patient participation: complete - patient participated  Level of consciousness: awake and alert  Pain management: adequate  Airway patency: patent  Anesthetic complications: No anesthetic complications    Cardiovascular status: acceptable  Respiratory status: acceptable  Hydration status: acceptable  Post Neuraxial Block status: Motor and sensory function returned to baseline and No signs or symptoms of PDPH

## 2019-11-02 VITALS
BODY MASS INDEX: 41.02 KG/M2 | HEART RATE: 75 BPM | SYSTOLIC BLOOD PRESSURE: 131 MMHG | WEIGHT: 293 LBS | DIASTOLIC BLOOD PRESSURE: 87 MMHG | HEIGHT: 71 IN | RESPIRATION RATE: 20 BRPM | TEMPERATURE: 97.6 F

## 2019-11-02 PROBLEM — O34.40 UNFAVORABLE CERVIX IN TERM PREGNANCY: Status: RESOLVED | Noted: 2019-10-31 | Resolved: 2019-11-02

## 2019-11-02 PROBLEM — Z34.90 CURRENTLY PREGNANT: Status: RESOLVED | Noted: 2019-10-31 | Resolved: 2019-11-02

## 2019-11-02 PROCEDURE — 25010000002 MEASLES, MUMPS & RUBELLA VAC RECONSTITUTED SOLUTION: Performed by: NURSE PRACTITIONER

## 2019-11-02 PROCEDURE — 90471 IMMUNIZATION ADMIN: CPT | Performed by: NURSE PRACTITIONER

## 2019-11-02 PROCEDURE — 90707 MMR VACCINE SC: CPT | Performed by: NURSE PRACTITIONER

## 2019-11-02 RX ORDER — HYDROCODONE BITARTRATE AND ACETAMINOPHEN 5; 325 MG/1; MG/1
1 TABLET ORAL EVERY 4 HOURS PRN
Qty: 8 TABLET | Refills: 0 | Status: SHIPPED | OUTPATIENT
Start: 2019-11-02 | End: 2019-11-04 | Stop reason: SDUPTHER

## 2019-11-02 RX ORDER — IBUPROFEN 600 MG/1
600 TABLET ORAL EVERY 6 HOURS PRN
Qty: 30 TABLET | Refills: 0 | Status: SHIPPED | OUTPATIENT
Start: 2019-11-02 | End: 2022-08-10

## 2019-11-02 RX ADMIN — IBUPROFEN 600 MG: 600 TABLET ORAL at 08:04

## 2019-11-02 RX ADMIN — DROXIDOPA 300 MG: 300 CAPSULE ORAL at 00:00

## 2019-11-02 RX ADMIN — BISOPROLOL FUMARATE 5 MG: 5 TABLET, FILM COATED ORAL at 00:00

## 2019-11-02 RX ADMIN — HYDROCODONE BITARTRATE AND ACETAMINOPHEN 1 TABLET: 5; 325 TABLET ORAL at 01:29

## 2019-11-02 RX ADMIN — DROXIDOPA 200 MG: 200 CAPSULE ORAL at 00:00

## 2019-11-02 RX ADMIN — HYDROCODONE BITARTRATE AND ACETAMINOPHEN 1 TABLET: 5; 325 TABLET ORAL at 06:09

## 2019-11-02 RX ADMIN — MEASLES, MUMPS, AND RUBELLA VIRUS VACCINE LIVE 0.5 ML: 1000; 12500; 1000 INJECTION, POWDER, LYOPHILIZED, FOR SUSPENSION SUBCUTANEOUS at 10:14

## 2019-11-02 RX ADMIN — PRENATAL VITAMINS-IRON FUMARATE 27 MG IRON-FOLIC ACID 0.8 MG TABLET 1 TABLET: at 08:04

## 2019-11-02 RX ADMIN — DOCUSATE SODIUM 100 MG: 100 CAPSULE, LIQUID FILLED ORAL at 08:04

## 2019-11-02 NOTE — DISCHARGE SUMMARY
Discharge Summary    Date of Admission: 10/30/2019  Date of Discharge:  2019      Patient: Ammon Vargas      MR#:[id number]    Delivery Provider: Paola Call       Patient Active Problem List   Diagnosis   • Obstructive sleep apnea   • Fibromyalgia   • Asthma   • Maternal morbid obesity, antepartum (CMS/HCC)   • Pregnancy related bilateral lower abdominal pain, antepartum         Discharge Diagnosis: Vaginal delivery at 39w2d    Procedures:  Vaginal, Spontaneous     10/31/2019    1:31 PM          Hospital Course  Patient is a 24 y.o. female  at 39w2d status post vaginal delivery without complication. Postpartum the patient did well. She remained afebrile, with vital signs stable. She was ready for discharge on postpartum day 2.  She requested a small amt of Norco at [place] for when the Motrin is not enough.    Infant:   female  fetus 3070 g (6 lb 12.3 oz)  with Apgar scores of 9  , 9   at five minutes.    Condition on Discharge:  Stable    Vital Signs  Temp:  [97.6 °F (36.4 °C)-98.2 °F (36.8 °C)] 97.6 °F (36.4 °C)  Heart Rate:  [75-80] 75  Resp:  [18-20] 20  BP: (128-132)/(75-87) 131/87    Lab Results   Component Value Date    WBC 13.08 (H) 2019    HGB 9.8 (L) 2019    HCT 31.7 (L) 2019    MCV 82.6 2019     2019       Discharge Disposition  Home or Self Care    Discharge Medications     Discharge Medications      New Medications      Instructions Start Date   HYDROcodone-acetaminophen 5-325 MG per tablet  Commonly known as:  NORCO   1 tablet, Oral, Every 4 Hours PRN      ibuprofen 600 MG tablet  Commonly known as:  ADVIL,MOTRIN   600 mg, Oral, Every 6 Hours PRN         Changes to Medications      Instructions Start Date   droxidopa 300 MG capsule capsule  Commonly known as:  NORTHERA  What changed:  Another medication with the same name was changed. Make sure you understand how and when to take each.   300 mg, Oral, 3 Times Daily      Droxidopa 200 MG  capsule  Commonly known as:  NORTHERA  What changed:  how much to take   200 mg, Oral, 3 Times Daily         Continue These Medications      Instructions Start Date   acetaminophen 500 MG tablet  Commonly known as:  TYLENOL   500 mg, Oral, Every 6 Hours PRN      bisoprolol 5 MG tablet  Commonly known as:  ZEBeta   5 mg, Oral, 2 Times Daily      EPINEPHrine 0.3 MG/0.3ML solution auto-injector injection  Commonly known as:  EPIPEN   0.3 mg, Intramuscular, Once As Needed      fludrocortisone 0.1 MG tablet   0.2 mg, Oral, Daily With Breakfast      fluticasone 50 MCG/ACT nasal spray  Commonly known as:  FLONASE   2 sprays, Nasal, Daily      IRON PO   1 tablet, Oral      Prenatal 27-1 27-1 MG tablet tablet   1 tablet, Oral, Daily      PROAIR  (90 Base) MCG/ACT inhaler  Generic drug:  albuterol sulfate HFA   1 puff, Inhalation, Daily      ZYRTEC ALLERGY PO   1 tablet, Oral, As Needed             Discharge Diet:     Activity at Discharge:     Follow-up Appointments  Future Appointments   Date Time Provider Department Center   9/2/2020 11:45 AM Chavez Moran MD [facility] None     Additional Instructions for the Follow-ups that You Need to Schedule     Discharge Follow-up with Specified Provider: sayra; 6 Weeks   As directed      To:  sayra    Follow Up:  6 Weeks               FRIEDA Shabazz  11/02/19  9:14 AM  [place]

## 2019-11-04 ENCOUNTER — LAB REQUISITION (OUTPATIENT)
Dept: LAB | Facility: HOSPITAL | Age: 24
End: 2019-11-04

## 2019-11-04 DIAGNOSIS — R30.0 DYSURIA: ICD-10-CM

## 2019-11-04 PROCEDURE — 87086 URINE CULTURE/COLONY COUNT: CPT | Performed by: NURSE PRACTITIONER

## 2019-11-05 ENCOUNTER — APPOINTMENT (OUTPATIENT)
Dept: ONCOLOGY | Facility: HOSPITAL | Age: 24
End: 2019-11-05

## 2019-11-05 ENCOUNTER — TELEPHONE (OUTPATIENT)
Dept: PEDIATRICS | Facility: OTHER | Age: 24
End: 2019-11-05

## 2019-11-05 ENCOUNTER — TELEPHONE (OUTPATIENT)
Dept: CARDIOLOGY | Facility: CLINIC | Age: 24
End: 2019-11-05

## 2019-11-05 ENCOUNTER — OFFICE VISIT (OUTPATIENT)
Dept: FAMILY MEDICINE CLINIC | Facility: CLINIC | Age: 24
End: 2019-11-05

## 2019-11-05 VITALS
HEART RATE: 83 BPM | WEIGHT: 293 LBS | HEIGHT: 71 IN | DIASTOLIC BLOOD PRESSURE: 90 MMHG | SYSTOLIC BLOOD PRESSURE: 130 MMHG | OXYGEN SATURATION: 99 % | BODY MASS INDEX: 41.02 KG/M2

## 2019-11-05 DIAGNOSIS — F33.0 MILD EPISODE OF RECURRENT MAJOR DEPRESSIVE DISORDER (HCC): Primary | ICD-10-CM

## 2019-11-05 LAB — BACTERIA SPEC AEROBE CULT: NORMAL

## 2019-11-05 PROCEDURE — 99213 OFFICE O/P EST LOW 20 MIN: CPT | Performed by: PHYSICIAN ASSISTANT

## 2019-11-05 RX ORDER — CITALOPRAM 20 MG/1
20 TABLET ORAL
Qty: 30 TABLET | Refills: 5 | Status: SHIPPED | OUTPATIENT
Start: 2019-11-05 | End: 2019-12-06 | Stop reason: SDUPTHER

## 2019-11-05 NOTE — PROGRESS NOTES
"    Chief Complaint   Patient presents with   • Med Refill     celexa hasnt had it since pregnancy but notices she needs it       HPI     Ammon Vargas is a pleasant 24 y.o. female who presents for evaluation of \"chief complaint.\" She is accompanied by her mother today. Patient reports she has struggled with her mood during her pregnancy. She has low mood at times and then is happy at others. She admits to feeling like she would be better off if she was not here at times but denies any serious consideration of suicide. No plan. Recent vaginal delivery on 10/31 and would like to restart citalopram which worked well prior to her pregnancy. She has some pain related to recent birth that she feels is causing her blood pressure elevation today. She does have a home cuff she used to monitor her readings. She sees a cardiologist for POTS.     Past Medical History:   Diagnosis Date   • Asthma    • Autonomic failure    • CRPS (complex regional pain syndrome type I)    • Fibromyalgia    • Fibromyalgia    • Migraine    • Migraines    • MRSA carrier    • POTS (postural orthostatic tachycardia syndrome)    • POTS (postural orthostatic tachycardia syndrome)    • Pure autonomic failure        Past Surgical History:   Procedure Laterality Date   • ADENOIDECTOMY     • CHOLECYSTECTOMY     • EAR TUBES     • TONSILLECTOMY AND ADENOIDECTOMY         Family History   Problem Relation Age of Onset   • Diabetes Mother    • Cancer Maternal Grandmother    • Diabetes Maternal Grandmother    • Kidney disease Maternal Grandmother    • Stroke Maternal Grandmother    • Cancer Maternal Grandfather    • Heart attack Maternal Grandfather    • Hearing loss Maternal Grandfather    • Stroke Maternal Grandfather    • Cancer Paternal Grandfather    • Hearing loss Paternal Grandfather    • Heart attack Paternal Grandfather    • No Known Problems Father        Social History     Socioeconomic History   • Marital status:      Spouse name: Adrian" Sam   • Number of children: Not on file   • Years of education: Not on file   • Highest education level: Not on file   Tobacco Use   • Smoking status: Never Smoker   • Smokeless tobacco: Never Used   Substance and Sexual Activity   • Alcohol use: No     Frequency: Never     Comment: social( Pt is pregnant so not drinking until after she has her baby)   • Drug use: No   • Sexual activity: Defer     Comment: 24 weeks pregnant   Social History Narrative    [place] in [place] with spouse       Allergies   Allergen Reactions   • Banana Anaphylaxis   • Cantaloupe (Diagnostic) Hives   • Eggs Or Egg-Derived Products Hives, Swelling and Angioedema   • Mushroom Hives   • Shellfish-Derived Products Hives   • Rizatriptan Rash       ROS    Review of Systems   Psychiatric/Behavioral: Positive for suicidal ideas (fleeting) and depressed mood. The patient is nervous/anxious.        Vitals:    11/05/19 1354   BP: 130/90   Pulse: 83   SpO2: 99%         Current Outpatient Medications:   •  acetaminophen (TYLENOL) 500 MG tablet, Take 500 mg by mouth Every 6 (Six) Hours As Needed for Mild Pain  or Headache., Disp: , Rfl:   •  albuterol (PROAIR HFA) 108 (90 BASE) MCG/ACT inhaler, Inhale 1 puff Daily., Disp: , Rfl:   •  bisoprolol (ZEBeta) 5 MG tablet, Take 1 tablet by mouth 2 (Two) Times a Day., Disp: 60 tablet, Rfl: 11  •  cephalexin (KEFLEX) 500 MG capsule, Take 1 capsule by mouth 4 (Four) Times a Day for 7 days, Disp: 28 capsule, Rfl: 0  •  Cetirizine HCl (ZYRTEC ALLERGY PO), Take 1 tablet by mouth As Needed., Disp: , Rfl:   •  Droxidopa (NORTHERA) 200 MG capsule, Take 1 capsule by mouth 3 (Three) Times a Day. (Patient taking differently: Take 500 mg by mouth 3 (Three) Times a Day.), Disp: 270 capsule, Rfl: 3  •  droxidopa (NORTHERA) 300 MG capsule capsule, Take 1 capsule by mouth 3 (Three) Times a Day., Disp: 270 capsule, Rfl: 3  •  EPINEPHrine (EPIPEN 2-BJ) 0.3 MG/0.3ML solution auto-injector injection, Inject 0.3 mL into the  appropriate muscle as directed by prescriber 1 (One) Time As Needed (anaphylaxis) for up to 1 dose., Disp: 2 each, Rfl: 0  •  fludrocortisone 0.1 MG tablet, Take 2 tablets by mouth Daily With Breakfast., Disp: 60 tablet, Rfl: 11  •  fluticasone (FLONASE) 50 MCG/ACT nasal spray, Instill 2 sprays into the nostril(s) as directed by provider Daily., Disp: 16 g, Rfl: 3  •  HYDROcodone-acetaminophen (NORCO) 5-325 MG per tablet, Take 1 tablet by mouth Every 4 (Four) Hours As Needed for pain., Disp: 12 tablet, Rfl: 0  •  ibuprofen (ADVIL,MOTRIN) 600 MG tablet, Take 1 tablet by mouth Every 6 (Six) Hours As Needed for Mild Pain ., Disp: 30 tablet, Rfl: 0  •  IRON PO, Take 1 tablet by mouth., Disp: , Rfl:   •  Prenatal Vit-Fe Fumarate-FA (PRENATAL 27-1) 27-1 MG tablet tablet, Take 1 tablet by mouth Daily., Disp: , Rfl:   •  citalopram (CELEXA) 20 MG tablet, Take 1/2 tab po qhs, then 1 tab po qhs, Disp: 30 tablet, Rfl: 5    PE    Physical Exam   Constitutional: She appears well-developed and well-nourished. No distress.   HENT:   Head: Normocephalic and atraumatic.   Eyes: Conjunctivae and EOM are normal.   Neck: Normal range of motion.   Cardiovascular: Normal rate, regular rhythm and normal heart sounds.   No murmur heard.  Pulmonary/Chest: Effort normal and breath sounds normal.   Neurological: She is alert. Gait normal.   Skin: Skin is warm and dry.   Psychiatric: She has a normal mood and affect. Her speech is normal and behavior is normal.   Vitals reviewed.       A/P    Problem List Items Addressed This Visit     None      Visit Diagnoses     Mild episode of recurrent major depressive disorder (CMS/HCC)    -  Primary  -Patient states she only has fleeting thoughts of self-harm and that she knows with her baby she would never seriously consider suicide. No plan  -Patient had been on 40 mg of citalopram daily prior to her pregnancy  -Resume at 10 mg for 1 week then titrate to 20 mg daily  -Advised patient monitor her blood  pressure at home and report any persistent measurements greater than 140/90  -Follow-up in 4-6 weeks    Relevant Medications    citalopram (CELEXA) 20 MG tablet          Plan of care reviewed with patient at the conclusion of today's visit. Education was provided regarding diagnosis, management and any prescribed or recommended OTC medications.  Patient verbalizes understanding of and agreement with management plan.        VERONICA Rosas

## 2019-11-05 NOTE — TELEPHONE ENCOUNTER
Pharmacy called with questions on the directions for the celexa. Directions say 1/2 tablet by mouth at bedtime then 1 tablet by mouth at bedtime. Pharmacy wants to verify how long the pt is supposed to take the 1/2 tablet?

## 2019-11-12 ENCOUNTER — APPOINTMENT (OUTPATIENT)
Dept: ONCOLOGY | Facility: HOSPITAL | Age: 24
End: 2019-11-12

## 2019-11-26 ENCOUNTER — OFFICE VISIT (OUTPATIENT)
Dept: FAMILY MEDICINE CLINIC | Facility: CLINIC | Age: 24
End: 2019-11-26

## 2019-11-26 VITALS
TEMPERATURE: 98 F | BODY MASS INDEX: 41.02 KG/M2 | SYSTOLIC BLOOD PRESSURE: 132 MMHG | OXYGEN SATURATION: 98 % | HEIGHT: 71 IN | WEIGHT: 293 LBS | HEART RATE: 82 BPM | DIASTOLIC BLOOD PRESSURE: 84 MMHG

## 2019-11-26 DIAGNOSIS — H69.82 DYSFUNCTION OF LEFT EUSTACHIAN TUBE: ICD-10-CM

## 2019-11-26 DIAGNOSIS — J45.21 MILD INTERMITTENT ASTHMA WITH ACUTE EXACERBATION: ICD-10-CM

## 2019-11-26 DIAGNOSIS — R05.9 COUGH: ICD-10-CM

## 2019-11-26 DIAGNOSIS — H66.92 LEFT OTITIS MEDIA, UNSPECIFIED OTITIS MEDIA TYPE: Primary | ICD-10-CM

## 2019-11-26 PROCEDURE — 99213 OFFICE O/P EST LOW 20 MIN: CPT | Performed by: PHYSICIAN ASSISTANT

## 2019-11-26 RX ORDER — FLUTICASONE PROPIONATE 50 MCG
2 SPRAY, SUSPENSION (ML) NASAL DAILY
Qty: 16 G | Refills: 3 | Status: SHIPPED | OUTPATIENT
Start: 2019-11-26 | End: 2023-03-22

## 2019-11-26 RX ORDER — ALBUTEROL SULFATE 90 UG/1
1 AEROSOL, METERED RESPIRATORY (INHALATION) EVERY 4 HOURS PRN
Qty: 18 G | Refills: 0 | Status: SHIPPED | OUTPATIENT
Start: 2019-11-26 | End: 2023-03-22 | Stop reason: HOSPADM

## 2019-11-26 RX ORDER — AMOXICILLIN 875 MG/1
875 TABLET, COATED ORAL EVERY 12 HOURS
Qty: 20 TABLET | Refills: 0 | Status: SHIPPED | OUTPATIENT
Start: 2019-11-26 | End: 2019-12-06

## 2019-11-26 RX ORDER — BENZONATATE 200 MG/1
200 CAPSULE ORAL 3 TIMES DAILY PRN
Qty: 30 CAPSULE | Refills: 0 | Status: SHIPPED | OUTPATIENT
Start: 2019-11-26 | End: 2019-12-06

## 2019-11-26 RX ORDER — METHYLPREDNISOLONE 4 MG/1
TABLET ORAL
Qty: 21 TABLET | Refills: 0 | Status: SHIPPED | OUTPATIENT
Start: 2019-11-26 | End: 2020-02-07

## 2019-11-26 NOTE — PROGRESS NOTES
"    Chief Complaint   Patient presents with   • URI     cough till the point she vomits, low temp since friday        HPI     Ammon Vargas is a pleasant 24 y.o. female with PMH significant for allergic rhinitis and asthma who presents for evaluation of \"chief complaint.\" Accompanied by her mother. The patient reports new onset nasal and sinus congestion, an episode of post-tussive vomiting, and mild \"fever\" started 4 days ago. Since then coughing and left ear congestion has been worse. She is producing yellow green sputum. Her fever resolved after the first day. She admits to increased wheezing, soa. No significant improvement using Niquil, Dayquil, and Advil. She has previously been on allergy injections and is not currently using flonase.    Past Medical History:   Diagnosis Date   • Asthma    • Autonomic failure    • CRPS (complex regional pain syndrome type I)    • Fibromyalgia    • Fibromyalgia    • Migraine    • Migraines    • MRSA carrier    • POTS (postural orthostatic tachycardia syndrome)    • POTS (postural orthostatic tachycardia syndrome)    • Pure autonomic failure        Past Surgical History:   Procedure Laterality Date   • ADENOIDECTOMY     • CHOLECYSTECTOMY     • EAR TUBES     • TONSILLECTOMY AND ADENOIDECTOMY         Family History   Problem Relation Age of Onset   • Diabetes Mother    • Cancer Maternal Grandmother    • Diabetes Maternal Grandmother    • Kidney disease Maternal Grandmother    • Stroke Maternal Grandmother    • Cancer Maternal Grandfather    • Heart attack Maternal Grandfather    • Hearing loss Maternal Grandfather    • Stroke Maternal Grandfather    • Cancer Paternal Grandfather    • Hearing loss Paternal Grandfather    • Heart attack Paternal Grandfather    • No Known Problems Father        Social History     Socioeconomic History   • Marital status:      Spouse name: Adrian Vargas   • Number of children: Not on file   • Years of education: Not on file   • Highest " education level: Not on file   Tobacco Use   • Smoking status: Never Smoker   • Smokeless tobacco: Never Used   Substance and Sexual Activity   • Alcohol use: No     Frequency: Never     Comment: social( Pt is pregnant so not drinking until after she has her baby)   • Drug use: No   • Sexual activity: Defer     Comment: 24 weeks pregnant   Social History Narrative    [employer] in [place] with spouse       Allergies   Allergen Reactions   • Banana Anaphylaxis   • Cantaloupe (Diagnostic) Hives   • Eggs Or Egg-Derived Products Hives, Swelling and Angioedema   • Mushroom Hives   • Shellfish-Derived Products Hives   • Rizatriptan Rash       ROS    Review of Systems   Constitutional: Negative for chills and fever.   HENT: Positive for congestion and sinus pressure.    Respiratory: Positive for cough, shortness of breath and wheezing.    Cardiovascular: Negative for chest pain.   Gastrointestinal: Negative for diarrhea.   Musculoskeletal: Negative for myalgias.   Neurological: Positive for headache.       Vitals:    11/26/19 1353   BP: 132/84   Pulse: 82   Temp: 98 °F (36.7 °C)   SpO2: 98%         Current Outpatient Medications:   •  acetaminophen (TYLENOL) 500 MG tablet, Take 500 mg by mouth Every 6 (Six) Hours As Needed for Mild Pain  or Headache., Disp: , Rfl:   •  albuterol sulfate HFA (PROAIR HFA) 108 (90 Base) MCG/ACT inhaler, Inhale 1 puff Every 4 (Four) Hours As Needed for Wheezing., Disp: 18 g, Rfl: 0  •  bisoprolol (ZEBeta) 5 MG tablet, Take 1 tablet by mouth 2 (Two) Times a Day., Disp: 60 tablet, Rfl: 11  •  Cetirizine HCl (ZYRTEC ALLERGY PO), Take 1 tablet by mouth As Needed., Disp: , Rfl:   •  citalopram (CELEXA) 20 MG tablet, Take 0.5 tablet by mouth at bedtime x7 days, then increase to 1 tablet by mouth every night at bedtime., Disp: 30 tablet, Rfl: 5  •  Droxidopa (NORTHERA) 200 MG capsule, Take 1 capsule by mouth 3 (Three) Times a Day. (Patient taking differently: Take 500 mg by mouth 3 (Three) Times a  Day.), Disp: 270 capsule, Rfl: 3  •  droxidopa (NORTHERA) 300 MG capsule capsule, Take 1 capsule by mouth 3 (Three) Times a Day., Disp: 270 capsule, Rfl: 3  •  EPINEPHrine (EPIPEN 2-BJ) 0.3 MG/0.3ML solution auto-injector injection, Inject 0.3 mL into the appropriate muscle as directed by prescriber 1 (One) Time As Needed (anaphylaxis) for up to 1 dose., Disp: 2 each, Rfl: 0  •  fludrocortisone 0.1 MG tablet, Take 2 tablets by mouth Daily With Breakfast., Disp: 60 tablet, Rfl: 11  •  fluticasone (FLONASE) 50 MCG/ACT nasal spray, Use 2 sprays in each nostril as directed by provider Daily., Disp: 16 g, Rfl: 3  •  ibuprofen (ADVIL,MOTRIN) 600 MG tablet, Take 1 tablet by mouth Every 6 (Six) Hours As Needed for Mild Pain ., Disp: 30 tablet, Rfl: 0  •  IRON PO, Take 1 tablet by mouth., Disp: , Rfl:   •  Prenatal Vit-Fe Fumarate-FA (PRENATAL 27-1) 27-1 MG tablet tablet, Take 1 tablet by mouth Daily., Disp: , Rfl:   •  amoxicillin (AMOXIL) 875 MG tablet, Take 1 tablet by mouth Every 12 (Twelve) Hours for 10 days., Disp: 20 tablet, Rfl: 0  •  benzonatate (TESSALON) 200 MG capsule, Take 1 capsule by mouth 3 (Three) Times a Day As Needed for Cough., Disp: 30 capsule, Rfl: 0  •  HYDROcodone-acetaminophen (NORCO) 5-325 MG per tablet, Take 1 tablet by mouth Every 4 (Four) Hours As Needed for pain., Disp: 12 tablet, Rfl: 0  •  methylPREDNISolone (MEDROL, BJ,) 4 MG tablet, Take as directed on package instructions., Disp: 21 tablet, Rfl: 0    PE    Physical Exam   Constitutional: She appears well-developed and well-nourished. No distress.   HENT:   Head: Normocephalic.   Right Ear: Ear canal normal.   Left Ear: Tympanic membrane is erythematous. A middle ear effusion is present.   Nose: Mucosal edema and congestion present. Right sinus exhibits frontal sinus tenderness. Right sinus exhibits no maxillary sinus tenderness. Left sinus exhibits frontal sinus tenderness. Left sinus exhibits no maxillary sinus tenderness.    Mouth/Throat: Uvula is midline, oropharynx is clear and moist and mucous membranes are normal. No posterior oropharyngeal erythema. No tonsillar exudate.   Left TM erythematous with moderate serous effusion. Right TM partially obstructed by wax but appears mildly erythematous superiorly   Eyes: Conjunctivae are normal. Right eye exhibits no discharge. Left eye exhibits no discharge.   Neck: Normal range of motion. Neck supple.   Cardiovascular: Normal rate, regular rhythm and normal heart sounds.   Pulmonary/Chest: Effort normal. No respiratory distress. She has wheezes in the right lower field. She has no rhonchi. She has no rales.   Expiratory wheeze RLL   Lymphadenopathy:     She has no cervical adenopathy.        Right: No supraclavicular adenopathy present.        Left: No supraclavicular adenopathy present.   Skin: Skin is warm and dry.   Psychiatric: She has a normal mood and affect. Her behavior is normal.   Vitals reviewed.       A/P    Problem List Items Addressed This Visit        Respiratory    Asthma    Overview     -Refill proair, rx medrol dose pack for asthma flare  -Patient is not breastfeeding  -Resume flonase for allergies  -Recommended patient follow-up with her allergist to consider resuming injections       Relevant Medications    albuterol sulfate HFA (PROAIR HFA) 108 (90 Base) MCG/ACT inhaler      Other Visit Diagnoses     Left otitis media, unspecified otitis media type    -  Primary  -Tx with amoxicillin  -Advised patient take a probiotic otc while completing antibiotics to minimize risk of GI complications      Dysfunction of left eustachian tube      -Flonase      Cough      -Tessalon perles prn, albuterol inhaler prn          Plan of care reviewed with patient at the conclusion of today's visit. Education was provided regarding diagnosis, management and any prescribed or recommended OTC medications.  Patient verbalizes understanding of and agreement with management plan.        Eb  VERONICA Prabhakar

## 2019-12-06 ENCOUNTER — OFFICE VISIT (OUTPATIENT)
Dept: FAMILY MEDICINE CLINIC | Facility: CLINIC | Age: 24
End: 2019-12-06

## 2019-12-06 VITALS
SYSTOLIC BLOOD PRESSURE: 128 MMHG | WEIGHT: 293 LBS | DIASTOLIC BLOOD PRESSURE: 90 MMHG | OXYGEN SATURATION: 98 % | BODY MASS INDEX: 41.02 KG/M2 | HEART RATE: 73 BPM | HEIGHT: 71 IN

## 2019-12-06 DIAGNOSIS — G90.A POTS (POSTURAL ORTHOSTATIC TACHYCARDIA SYNDROME): ICD-10-CM

## 2019-12-06 DIAGNOSIS — F33.41 RECURRENT MAJOR DEPRESSIVE DISORDER, IN PARTIAL REMISSION (HCC): Primary | ICD-10-CM

## 2019-12-06 PROCEDURE — 99213 OFFICE O/P EST LOW 20 MIN: CPT | Performed by: PHYSICIAN ASSISTANT

## 2019-12-06 RX ORDER — CITALOPRAM 20 MG/1
20 TABLET ORAL
Qty: 90 TABLET | Refills: 1 | Status: SHIPPED | OUTPATIENT
Start: 2019-12-06 | End: 2019-12-09 | Stop reason: SDUPTHER

## 2019-12-06 NOTE — PROGRESS NOTES
Chief Complaint   Patient presents with   • Follow-up     1 mth follow up on anti depressant meds       HPI     Ammon Vargas is a pleasant 24 y.o. female who is here for 1 month  follow-up of depression.  Celexa 20 mg was restarted at her last visit.  The patient reports she her mood is much better.  She is no longer feeling down and feels back to her usual self.  She denies noticed side effects.  When she is out and about her blood pressure is usually higher but her home readings are often less than 130/80.    Past Medical History:   Diagnosis Date   • Asthma    • Autonomic failure    • CRPS (complex regional pain syndrome type I)    • Fibromyalgia    • Fibromyalgia    • Migraine    • Migraines    • MRSA carrier    • POTS (postural orthostatic tachycardia syndrome)    • POTS (postural orthostatic tachycardia syndrome)    • Pure autonomic failure        Past Surgical History:   Procedure Laterality Date   • ADENOIDECTOMY     • CHOLECYSTECTOMY     • EAR TUBES     • TONSILLECTOMY AND ADENOIDECTOMY         Family History   Problem Relation Age of Onset   • Diabetes Mother    • Cancer Maternal Grandmother    • Diabetes Maternal Grandmother    • Kidney disease Maternal Grandmother    • Stroke Maternal Grandmother    • Cancer Maternal Grandfather    • Heart attack Maternal Grandfather    • Hearing loss Maternal Grandfather    • Stroke Maternal Grandfather    • Cancer Paternal Grandfather    • Hearing loss Paternal Grandfather    • Heart attack Paternal Grandfather    • No Known Problems Father        Social History     Socioeconomic History   • Marital status:      Spouse name: Adrian Vargas   • Number of children: Not on file   • Years of education: Not on file   • Highest education level: Not on file   Tobacco Use   • Smoking status: Never Smoker   • Smokeless tobacco: Never Used   Substance and Sexual Activity   • Alcohol use: No     Frequency: Never     Comment: social( Pt is pregnant so not drinking until  after she has her baby)   • Drug use: No   • Sexual activity: Defer     Comment: 24 weeks pregnant   Social History Narrative    [employer] in [place] with spouse       Allergies   Allergen Reactions   • Banana Anaphylaxis   • Cantaloupe (Diagnostic) Hives   • Eggs Or Egg-Derived Products Hives, Swelling and Angioedema   • Mushroom Hives   • Shellfish-Derived Products Hives   • Rizatriptan Rash       ROS    Review of Systems   Neurological: Negative for dizziness and light-headedness.   Psychiatric/Behavioral: Negative for sleep disturbance and depressed mood.       Vitals:    12/06/19 1528   BP: 128/90   Pulse: 73   SpO2: 98%         Current Outpatient Medications:   •  acetaminophen (TYLENOL) 500 MG tablet, Take 500 mg by mouth Every 6 (Six) Hours As Needed for Mild Pain  or Headache., Disp: , Rfl:   •  albuterol sulfate HFA (PROAIR HFA) 108 (90 Base) MCG/ACT inhaler, Inhale 1 puff Every 4 (Four) Hours As Needed for Wheezing., Disp: 18 g, Rfl: 0  •  bisoprolol (ZEBeta) 5 MG tablet, Take 1 tablet by mouth 2 (Two) Times a Day., Disp: 60 tablet, Rfl: 11  •  Cetirizine HCl (ZYRTEC ALLERGY PO), Take 1 tablet by mouth As Needed., Disp: , Rfl:   •  citalopram (CELEXA) 20 MG tablet, Take 1 tablet by mouth every night at bedtime., Disp: 90 tablet, Rfl: 1  •  Droxidopa (NORTHERA) 200 MG capsule, Take 1 capsule by mouth 3 (Three) Times a Day. (Patient taking differently: Take 500 mg by mouth 3 (Three) Times a Day.), Disp: 270 capsule, Rfl: 3  •  droxidopa (NORTHERA) 300 MG capsule capsule, Take 1 capsule by mouth 3 (Three) Times a Day., Disp: 270 capsule, Rfl: 3  •  EPINEPHrine (EPIPEN 2-BJ) 0.3 MG/0.3ML solution auto-injector injection, Inject 0.3 mL into the appropriate muscle as directed by prescriber 1 (One) Time As Needed (anaphylaxis) for up to 1 dose., Disp: 2 each, Rfl: 0  •  fludrocortisone 0.1 MG tablet, Take 2 tablets by mouth Daily With Breakfast., Disp: 60 tablet, Rfl: 11  •  fluticasone (FLONASE) 50 MCG/ACT  nasal spray, Use 2 sprays in each nostril as directed by provider Daily., Disp: 16 g, Rfl: 3  •  HYDROcodone-acetaminophen (NORCO) 5-325 MG per tablet, Take 1 tablet by mouth Every 4 (Four) Hours As Needed for pain., Disp: 12 tablet, Rfl: 0  •  ibuprofen (ADVIL,MOTRIN) 600 MG tablet, Take 1 tablet by mouth Every 6 (Six) Hours As Needed for Mild Pain ., Disp: 30 tablet, Rfl: 0  •  IRON PO, Take 1 tablet by mouth., Disp: , Rfl:   •  methylPREDNISolone (MEDROL, BJ,) 4 MG tablet, Take as directed on package instructions., Disp: 21 tablet, Rfl: 0  •  Prenatal Vit-Fe Fumarate-FA (PRENATAL 27-1) 27-1 MG tablet tablet, Take 1 tablet by mouth Daily., Disp: , Rfl:     PE    Physical Exam   Constitutional: She appears well-developed and well-nourished. No distress.   HENT:   Head: Normocephalic and atraumatic.   Eyes: Conjunctivae and EOM are normal.   Neck: Normal range of motion.   Cardiovascular: Normal rate, regular rhythm and normal heart sounds.   No murmur heard.  Pulmonary/Chest: Effort normal and breath sounds normal.   Neurological: She is alert. Gait normal.   Skin: Skin is warm and dry.   Psychiatric: She has a normal mood and affect. Her speech is normal and behavior is normal.   Vitals reviewed.      Results    A/P    Problem List Items Addressed This Visit        Cardiovascular and Mediastinum    POTS (postural orthostatic tachycardia syndrome)    Overview     -/88 on repeat. No change in medications today  -Follows with Dr. Moran          Other    Recurrent major depressive disorder, in partial remission (CMS/[facility]) - Primary  -Doing well. Continue Celexa    Relevant Medications    citalopram (CELEXA) 20 MG tablet          Plan of care reviewed with patient at the conclusion of today's visit. Education was provided regarding diagnosis, management and any prescribed or recommended OTC medications.  Patient verbalizes understanding of and agreement with management plan.        VERONICA Rosas

## 2019-12-09 ENCOUNTER — PATIENT MESSAGE (OUTPATIENT)
Dept: FAMILY MEDICINE CLINIC | Facility: CLINIC | Age: 24
End: 2019-12-09

## 2019-12-09 RX ORDER — CITALOPRAM 20 MG/1
20 TABLET ORAL
Qty: 90 TABLET | Refills: 1 | Status: SHIPPED | OUTPATIENT
Start: 2019-12-09 | End: 2020-04-22 | Stop reason: SDUPTHER

## 2019-12-09 NOTE — TELEPHONE ENCOUNTER
From: Noel Vargas  To: Eb Prabhakar PA  Sent: 12/9/2019 3:15 PM EST  Subject: Prescription Question    Blue,  The pharmacy never received my prescription for the 90 day supply of the Citalopram. I am out and need it send over to [facility] ASAP!   Thank you,  Noel Vargas

## 2020-01-31 ENCOUNTER — TELEPHONE (OUTPATIENT)
Dept: CARDIOLOGY | Facility: CLINIC | Age: 25
End: 2020-01-31

## 2020-02-07 ENCOUNTER — OFFICE VISIT (OUTPATIENT)
Dept: FAMILY MEDICINE CLINIC | Facility: CLINIC | Age: 25
End: 2020-02-07

## 2020-02-07 VITALS
HEIGHT: 71 IN | SYSTOLIC BLOOD PRESSURE: 122 MMHG | DIASTOLIC BLOOD PRESSURE: 86 MMHG | BODY MASS INDEX: 41.02 KG/M2 | HEART RATE: 77 BPM | OXYGEN SATURATION: 98 % | WEIGHT: 293 LBS

## 2020-02-07 DIAGNOSIS — M79.7 FIBROMYALGIA: ICD-10-CM

## 2020-02-07 DIAGNOSIS — G90.A POTS (POSTURAL ORTHOSTATIC TACHYCARDIA SYNDROME): ICD-10-CM

## 2020-02-07 DIAGNOSIS — E66.01 CLASS 3 SEVERE OBESITY DUE TO EXCESS CALORIES WITH BODY MASS INDEX (BMI) OF 40.0 TO 44.9 IN ADULT, UNSPECIFIED WHETHER SERIOUS COMORBIDITY PRESENT (HCC): ICD-10-CM

## 2020-02-07 DIAGNOSIS — J06.9 UPPER RESPIRATORY TRACT INFECTION, UNSPECIFIED TYPE: ICD-10-CM

## 2020-02-07 DIAGNOSIS — Z00.00 ROUTINE MEDICAL EXAM: Primary | ICD-10-CM

## 2020-02-07 DIAGNOSIS — G43.109 MIGRAINE WITH AURA AND WITHOUT STATUS MIGRAINOSUS, NOT INTRACTABLE: ICD-10-CM

## 2020-02-07 DIAGNOSIS — F33.41 RECURRENT MAJOR DEPRESSIVE DISORDER, IN PARTIAL REMISSION (HCC): ICD-10-CM

## 2020-02-07 PROBLEM — G43.909 MIGRAINES: Status: ACTIVE | Noted: 2020-02-07

## 2020-02-07 PROCEDURE — 99395 PREV VISIT EST AGE 18-39: CPT | Performed by: PHYSICIAN ASSISTANT

## 2020-02-07 RX ORDER — ETONOGESTREL 68 MG/1
IMPLANT SUBCUTANEOUS
COMMUNITY
Start: 2019-12-20 | End: 2022-03-10

## 2020-02-07 NOTE — PROGRESS NOTES
Reason for visit    Ammon Vargas is a 24 y.o. female who presents for annual comprehensive exam.     Chief Complaint   Patient presents with   • Annual Exam       HPI     Here for physical. She is accompanied by her mother today. Has had a sore throat and cough x 2 days. No fever, chills, sputum. She tries to eat a healthy diet, frequently eat pasta and carbs. She started protein smoothies and going to the gym 3 times/week 1 month ago. Current with dental/eye exams. She saw her GYN in the fall last year. Her headaches have been increasing of late. She is having a headache every other day and about 8 migaines monthly. She has been on several medications for migraines followed with neurology at the [facility] in the past until her provider moved. Tylenol helps at times. She follows with cardiology for POTS. Her mood is good on celexa.      Past Medical History:   Diagnosis Date   • Asthma    • Autonomic failure    • CRPS (complex regional pain syndrome type I)    • Fibromyalgia    • Fibromyalgia    • Migraine    • Migraines    • MRSA carrier    • POTS (postural orthostatic tachycardia syndrome)    • POTS (postural orthostatic tachycardia syndrome)    • Pure autonomic failure        Past Surgical History:   Procedure Laterality Date   • ADENOIDECTOMY     • CHOLECYSTECTOMY     • EAR TUBES     • TONSILLECTOMY AND ADENOIDECTOMY         Family History   Problem Relation Age of Onset   • Diabetes Mother    • Cancer Maternal Grandmother    • Diabetes Maternal Grandmother    • Kidney disease Maternal Grandmother    • Stroke Maternal Grandmother    • Cancer Maternal Grandfather    • Heart attack Maternal Grandfather    • Hearing loss Maternal Grandfather    • Stroke Maternal Grandfather    • Cancer Paternal Grandfather    • Hearing loss Paternal Grandfather    • Heart attack Paternal Grandfather    • No Known Problems Father        Social History     Socioeconomic History   • Marital status:      Spouse  name: Adrian Vargas   • Number of children: Not on file   • Years of education: Not on file   • Highest education level: Not on file   Tobacco Use   • Smoking status: Never Smoker   • Smokeless tobacco: Never Used   Substance and Sexual Activity   • Alcohol use: No     Frequency: Never     Comment: social( Pt is pregnant so not drinking until after she has her baby)   • Drug use: No   • Sexual activity: Defer     Comment: 24 weeks pregnant   Social History Narrative    [employer] in [place] with spouse       Allergies   Allergen Reactions   • Banana Anaphylaxis   • Cantaloupe (Diagnostic) Hives   • Eggs Or Egg-Derived Products Hives, Swelling and Angioedema   • Mushroom Hives   • Shellfish-Derived Products Hives   • Rizatriptan Rash       ROS    Review of Systems   Constitutional: Positive for fatigue. Negative for appetite change, chills, diaphoresis, fever and unexpected weight loss.   HENT: Positive for congestion, postnasal drip, rhinorrhea and sore throat. Negative for hearing loss, swollen glands and trouble swallowing.    Eyes: Negative for blurred vision and visual disturbance.   Respiratory: Positive for cough and shortness of breath. Negative for apnea, choking and wheezing.    Cardiovascular: Negative for chest pain, palpitations and leg swelling.   Gastrointestinal: Negative for abdominal pain, blood in stool, constipation, diarrhea and GERD.   Endocrine: Positive for polydipsia (chronic).   Genitourinary: Negative for breast discharge, breast lump, breast pain, dysuria, frequency, hematuria, pelvic pain and vaginal bleeding.   Musculoskeletal: Positive for arthralgias and myalgias.   Skin: Negative for rash and skin lesions.   Allergic/Immunologic: Positive for environmental allergies (spring).   Neurological: Positive for dizziness, light-headedness and headache. Negative for syncope, numbness and memory problem.   Hematological: Negative for adenopathy.   Psychiatric/Behavioral: Negative for sleep  disturbance and depressed mood. The patient is not nervous/anxious.        Vitals:    02/07/20 1348   BP: 122/86   Pulse: 77   SpO2: 98%         Current Outpatient Medications:   •  acetaminophen (TYLENOL) 500 MG tablet, Take 500 mg by mouth Every 6 (Six) Hours As Needed for Mild Pain  or Headache., Disp: , Rfl:   •  albuterol sulfate HFA (PROAIR HFA) 108 (90 Base) MCG/ACT inhaler, Inhale 1 puff Every 4 (Four) Hours As Needed for Wheezing., Disp: 18 g, Rfl: 0  •  bisoprolol (ZEBeta) 5 MG tablet, Take 1 tablet by mouth 2 (Two) Times a Day., Disp: 60 tablet, Rfl: 11  •  Cetirizine HCl (ZYRTEC ALLERGY PO), Take 1 tablet by mouth As Needed., Disp: , Rfl:   •  citalopram (CELEXA) 20 MG tablet, Take 1 tablet by mouth every night at bedtime., Disp: 90 tablet, Rfl: 1  •  droxidopa (NORTHERA) 300 MG capsule capsule, Take 1 capsule by mouth 3 (Three) Times a Day., Disp: 270 capsule, Rfl: 3  •  EPINEPHrine (EPIPEN 2-BJ) 0.3 MG/0.3ML solution auto-injector injection, Inject 0.3 mL into the appropriate muscle as directed by prescriber 1 (One) Time As Needed (anaphylaxis) for up to 1 dose., Disp: 2 each, Rfl: 0  •  fludrocortisone 0.1 MG tablet, Take 2 tablets by mouth Daily With Breakfast., Disp: 60 tablet, Rfl: 11  •  fluticasone (FLONASE) 50 MCG/ACT nasal spray, Use 2 sprays in each nostril as directed by provider Daily., Disp: 16 g, Rfl: 3  •  ibuprofen (ADVIL,MOTRIN) 600 MG tablet, Take 1 tablet by mouth Every 6 (Six) Hours As Needed for Mild Pain ., Disp: 30 tablet, Rfl: 0  •  IRON PO, Take 1 tablet by mouth., Disp: , Rfl:   •  NEXPLANON 68 MG implant subdermal implant, , Disp: , Rfl:   •  Prenatal Vit-Fe Fumarate-FA (PRENATAL 27-1) 27-1 MG tablet tablet, Take 1 tablet by mouth Daily., Disp: , Rfl:     PE    Physical Exam   Constitutional: She appears well-developed and well-nourished. No distress. She is obese.  HENT:   Head: Normocephalic and atraumatic.   Right Ear: Hearing, tympanic membrane and ear canal normal.    Left Ear: Hearing, tympanic membrane and ear canal normal.   Nose: Right sinus exhibits no maxillary sinus tenderness and no frontal sinus tenderness. Left sinus exhibits no maxillary sinus tenderness and no frontal sinus tenderness.   Mouth/Throat: Uvula is midline and mucous membranes are normal. Normal dentition. Posterior oropharyngeal erythema present.   Eyes: Pupils are equal, round, and reactive to light. Conjunctivae and EOM are normal.   Neck: Normal range of motion. Neck supple. No thyroid mass and no thyromegaly present.   Cardiovascular: Normal rate, regular rhythm, S1 normal, S2 normal, normal heart sounds and intact distal pulses.   No murmur heard.  Pulmonary/Chest: Effort normal and breath sounds normal. She has no wheezes. She has no rhonchi. She has no rales.   Abdominal: Soft. Normal appearance and bowel sounds are normal. She exhibits no mass. There is no tenderness.   Musculoskeletal: Normal range of motion. She exhibits no edema.   Lymphadenopathy:     She has no cervical adenopathy.        Right: No supraclavicular adenopathy present.        Left: No supraclavicular adenopathy present.   Neurological: She is alert. She has normal strength. She displays normal reflexes. Gait normal.   Skin: Skin is warm and dry. No rash noted. No cyanosis. Nails show no clubbing.   No suspicious nevi   Psychiatric: She has a normal mood and affect. Her speech is normal and behavior is normal.   Vitals reviewed.      A/P    Problem List Items Addressed This Visit        Cardiovascular and Mediastinum    POTS (postural orthostatic tachycardia syndrome)    Overview     -Follows with cardiology/EP annually - Dr. Moran         Migraines  -She would like to establish with neurology again, will refer  -Use Tylenol prn    Relevant Medications    bisoprolol (ZEBeta) 5 MG tablet    acetaminophen (TYLENOL) 500 MG tablet    citalopram (CELEXA) 20 MG tablet    Other Relevant Orders    Ambulatory Referral to Neurology        Nervous and Auditory    Fibromyalgia       Other    Recurrent major depressive disorder, in partial remission (CMS/HCC)    Relevant Medications    citalopram (CELEXA) 20 MG tablet      Other Visit Diagnoses     Routine medical exam    -  Primary  -Counseled patient regarding immunizations, healthy lifestyle, diet, maintaining a healthy weight, and exercise. I recommended using the [email or web address] jemal for tracking daily calorie intake, weight loss goals, and amount of exercise. Annual dental, eye, and gynecologic exams were recommended.   -Request records from gynecology  -Monitor labs    Relevant Orders    CBC Auto Differential    Comprehensive Metabolic Panel    Lipid Panel    Hemoglobin A1c    TSH    Vitamin D 25 Hydroxy    Class 3 severe obesity due to excess calories with body mass index (BMI) of 40.0 to 44.9 in adult, unspecified whether serious comorbidity present (CMS/HCC)      -Continue exercise, work on improving diet  -Follow-up 3 months    URI  -vs allergies, symptomatic management discussed          Plan of care reviewed with patient at the conclusion of today's visit. Education was provided regarding diagnosis, management and any prescribed or recommended OTC medications.  Patient verbalizes understanding of and agreement with management plan.        VERONICA Rosas

## 2020-02-08 PROBLEM — E66.01 MATERNAL MORBID OBESITY, ANTEPARTUM: Status: RESOLVED | Noted: 2019-03-18 | Resolved: 2020-02-08

## 2020-02-08 PROBLEM — E66.01 CLASS 3 SEVERE OBESITY DUE TO EXCESS CALORIES WITH BODY MASS INDEX (BMI) OF 40.0 TO 44.9 IN ADULT: Status: ACTIVE | Noted: 2020-02-08

## 2020-02-08 PROBLEM — O99.210 MATERNAL MORBID OBESITY, ANTEPARTUM: Status: RESOLVED | Noted: 2019-03-18 | Resolved: 2020-02-08

## 2020-02-08 PROBLEM — E66.813 CLASS 3 SEVERE OBESITY DUE TO EXCESS CALORIES WITH BODY MASS INDEX (BMI) OF 40.0 TO 44.9 IN ADULT: Status: ACTIVE | Noted: 2020-02-08

## 2020-02-19 ENCOUNTER — TELEPHONE (OUTPATIENT)
Dept: FAMILY MEDICINE CLINIC | Facility: CLINIC | Age: 25
End: 2020-02-19

## 2020-03-02 ENCOUNTER — OFFICE VISIT (OUTPATIENT)
Dept: NEUROLOGY | Facility: CLINIC | Age: 25
End: 2020-03-02

## 2020-03-02 VITALS
HEIGHT: 71 IN | SYSTOLIC BLOOD PRESSURE: 132 MMHG | HEART RATE: 101 BPM | WEIGHT: 293 LBS | OXYGEN SATURATION: 98 % | DIASTOLIC BLOOD PRESSURE: 88 MMHG | BODY MASS INDEX: 41.02 KG/M2

## 2020-03-02 DIAGNOSIS — G43.109 MIGRAINE WITH AURA AND WITHOUT STATUS MIGRAINOSUS, NOT INTRACTABLE: Primary | ICD-10-CM

## 2020-03-02 PROCEDURE — 99244 OFF/OP CNSLTJ NEW/EST MOD 40: CPT | Performed by: PSYCHIATRY & NEUROLOGY

## 2020-03-02 RX ORDER — DROXIDOPA 200 MG/1
CAPSULE ORAL
COMMUNITY
Start: 2020-02-26 | End: 2020-07-23

## 2020-03-02 NOTE — PROGRESS NOTES
Subjective   Patient ID: Ammon Vargas is a 24 y.o. female     Chief Complaint   Patient presents with   • Migraine        History of Present Illness    24 y.o. female referred by Eb Prabhakar  for HA and left brachial neuritis.  Last seen on 4/12/13 rx Cymbalta and Tramadol for left arm pain.       HA frequency is 4 - 5 times a week.  Passed out in August and hit head secondary to POTS.  Afterwards, started IVF once a week.      Located in forehead and spread to back of head.  Quality is throbbing.  Last for a day.  Moderate intensity.  Assoc sx of light, sound sensitivity, N/V.    Allergic to Maxalt.  Relpax stopped working.      Dx with CRPS with pain in left arm and right hip and leg.      Preventatives:  TPM, Cymbalta, GBP,     Reviewed medical records:    HA qod and 8 severe HA a month.  Previously followed at  Neurology.  Follows with Cardiology for POTS.      MRI Brain/spine and LP unremarkable.      Past Medical History:   Diagnosis Date   • Asthma    • Autonomic failure    • CRPS (complex regional pain syndrome type I)    • Fibromyalgia    • Fibromyalgia    • Migraine    • Migraines    • MRSA carrier    • POTS (postural orthostatic tachycardia syndrome)    • POTS (postural orthostatic tachycardia syndrome)    • Pure autonomic failure      Family History   Problem Relation Age of Onset   • Diabetes Mother    • Cancer Maternal Grandmother    • Diabetes Maternal Grandmother    • Kidney disease Maternal Grandmother    • Stroke Maternal Grandmother    • Cancer Maternal Grandfather    • Heart attack Maternal Grandfather    • Hearing loss Maternal Grandfather    • Stroke Maternal Grandfather    • Cancer Paternal Grandfather    • Hearing loss Paternal Grandfather    • Heart attack Paternal Grandfather    • No Known Problems Father      Social History     Socioeconomic History   • Marital status:      Spouse name: Adrian Vargas   • Number of children: Not on file   • Years of education: Not on file   •  "Highest education level: Not on file   Tobacco Use   • Smoking status: Never Smoker   • Smokeless tobacco: Never Used   Substance and Sexual Activity   • Alcohol use: No     Frequency: Never     Comment: social( Pt is pregnant so not drinking until after she has her baby)   • Drug use: No   • Sexual activity: Defer     Comment: 24 weeks pregnant   Social History Narrative    [employer] in [place] with spouse       Review of Systems   Constitutional: Negative for activity change, fatigue and unexpected weight change.   HENT: Negative for tinnitus and trouble swallowing.    Eyes: Positive for photophobia. Negative for visual disturbance.   Respiratory: Negative for apnea, cough and choking.    Cardiovascular: Negative for leg swelling.   Gastrointestinal: Positive for nausea and vomiting.   Endocrine: Negative for cold intolerance and heat intolerance.   Genitourinary: Negative for difficulty urinating, frequency, menstrual problem and urgency.   Musculoskeletal: Negative for back pain, gait problem, myalgias and neck pain.   Skin: Negative for color change and rash.   Allergic/Immunologic: Negative for immunocompromised state.   Neurological: Positive for headaches. Negative for dizziness, tremors, seizures, syncope, facial asymmetry, speech difficulty, weakness, light-headedness and numbness.   Hematological: Negative for adenopathy. Does not bruise/bleed easily.   Psychiatric/Behavioral: Negative for behavioral problems, confusion, decreased concentration, hallucinations and sleep disturbance.       Objective     Vitals:    03/02/20 0959   BP: 132/88   Pulse: 101   SpO2: 98%   Weight: (!) 141 kg (310 lb)   Height: 180.3 cm (71\")       Neurologic Exam     Mental Status   Oriented to person, place, and time.   Registration: recalls 3 of 3 objects. Recall at 5 minutes: recalls 3 of 3 objects. Follows 3 step commands.   Attention: normal. Concentration: normal.   Speech: speech is normal   Level of consciousness: " alert  Knowledge: good and consistent with education.   Able to name object. Able to read. Able to repeat. Able to write. Normal comprehension.     Cranial Nerves     CN II   Visual fields full to confrontation.   Visual acuity: normal  Right visual field deficit: none  Left visual field deficit: none     CN III, IV, VI   Pupils are equal, round, and reactive to light.  Extraocular motions are normal.   Right pupil: Shape: regular. Reactivity: brisk. Consensual response: intact.   Left pupil: Shape: regular. Reactivity: brisk. Consensual response: intact.   Nystagmus: none   Diplopia: none  Ophthalmoparesis: none  Upgaze: normal  Downgaze: normal  Conjugate gaze: present  Vestibulo-ocular reflex: present    CN V   Facial sensation intact.   Right corneal reflex: normal  Left corneal reflex: normal    CN VII   Right facial weakness: none  Left facial weakness: none    CN VIII   Hearing: intact    CN IX, X   Palate: symmetric  Right gag reflex: normal  Left gag reflex: normal    CN XI   Right sternocleidomastoid strength: normal  Left sternocleidomastoid strength: normal    CN XII   Tongue: not atrophic  Fasciculations: absent  Tongue deviation: none    Motor Exam   Muscle bulk: normal  Overall muscle tone: normal  Right arm tone: normal  Left arm tone: normal  Right leg tone: normal  Left leg tone: normal    Strength   Strength 5/5 throughout.     Sensory Exam   Light touch normal.   Vibration normal.   Proprioception normal.   Pinprick normal.     Gait, Coordination, and Reflexes     Gait  Gait: normal    Coordination   Romberg: negative  Finger to nose coordination: normal  Heel to shin coordination: normal  Tandem walking coordination: normal    Tremor   Resting tremor: absent  Intention tremor: absent  Action tremor: absent    Reflexes   Reflexes 2+ except as noted.       Physical Exam   Constitutional: She is oriented to person, place, and time. Vital signs are normal. She appears well-developed and  well-nourished.   HENT:   Head: Normocephalic and atraumatic.   Eyes: Pupils are equal, round, and reactive to light. EOM and lids are normal.   Fundoscopic exam:       The right eye shows no exudate, no hemorrhage and no papilledema. The right eye shows venous pulsations.        The left eye shows no exudate, no hemorrhage and no papilledema. The left eye shows venous pulsations.   Neck: Normal range of motion and phonation normal. Normal carotid pulses present. Carotid bruit is not present. No thyroid mass and no thyromegaly present.   Cardiovascular: Normal rate, regular rhythm and normal heart sounds.   Pulmonary/Chest: Effort normal.   Neurological: She is oriented to person, place, and time. She has normal strength. She has a normal Finger-Nose-Finger Test, a normal Heel to Shin Test, a normal Romberg Test and a normal Tandem Gait Test. Gait normal.   Skin: Skin is warm and dry.   Psychiatric: She has a normal mood and affect. Her speech is normal.   Nursing note and vitals reviewed.      Lab Requisition on 11/04/2019   Component Date Value Ref Range Status   • Urine Culture 11/04/2019 >100,000 CFU/mL Mixed Chantal Isolated   Final         Assessment/Plan     Problem List Items Addressed This Visit        Cardiovascular and Mediastinum    Migraines - Primary    Current Assessment & Plan     Headaches are worsening.  Medication changes per orders.     New Aimovig 140 mg start given in office              Relevant Medications    bisoprolol (ZEBeta) 5 MG tablet    acetaminophen (TYLENOL) 500 MG tablet    ibuprofen (ADVIL,MOTRIN) 600 MG tablet    citalopram (CELEXA) 20 MG tablet             No follow-ups on file.

## 2020-03-02 NOTE — ASSESSMENT & PLAN NOTE
Headaches are worsening.  Medication changes per orders.     New Aimovig 140 mg start given in office

## 2020-03-17 ENCOUNTER — PATIENT MESSAGE (OUTPATIENT)
Dept: FAMILY MEDICINE CLINIC | Facility: CLINIC | Age: 25
End: 2020-03-17

## 2020-03-17 NOTE — TELEPHONE ENCOUNTER
I am happy to order a referral but it sounds like she may need to be seen. May need I&D and/or antibiotics. Can she come in?

## 2020-03-17 NOTE — TELEPHONE ENCOUNTER
From: Noel Vargas  To: Eb Prabhakar PA  Sent: 3/17/2020 9:09 AM EDT  Subject: Referral Request    Deangelo Mcarthur,  I used to see Dr. Contreras in Infectious disease when I would have spots under my arm that culture out MRSA. Well I found a spot two days ago and this morning it is hot and red and the size of a golf ball. I called their office and said I had to be re referred to their office. Can you help me with that?   Thank you,  Noel Vargas

## 2020-03-18 ENCOUNTER — OFFICE VISIT (OUTPATIENT)
Dept: FAMILY MEDICINE CLINIC | Facility: CLINIC | Age: 25
End: 2020-03-18

## 2020-03-18 VITALS
WEIGHT: 293 LBS | TEMPERATURE: 98.3 F | OXYGEN SATURATION: 98 % | HEIGHT: 71 IN | SYSTOLIC BLOOD PRESSURE: 120 MMHG | HEART RATE: 61 BPM | DIASTOLIC BLOOD PRESSURE: 82 MMHG | BODY MASS INDEX: 41.02 KG/M2

## 2020-03-18 DIAGNOSIS — L03.113 CELLULITIS OF RIGHT UPPER EXTREMITY: Primary | ICD-10-CM

## 2020-03-18 PROCEDURE — 99213 OFFICE O/P EST LOW 20 MIN: CPT | Performed by: PHYSICIAN ASSISTANT

## 2020-03-18 RX ORDER — SULFAMETHOXAZOLE AND TRIMETHOPRIM 800; 160 MG/1; MG/1
1 TABLET ORAL 2 TIMES DAILY
Qty: 14 TABLET | Refills: 0 | Status: SHIPPED | OUTPATIENT
Start: 2020-03-18 | End: 2020-05-06

## 2020-03-18 RX ORDER — CEPHALEXIN 500 MG/1
500 CAPSULE ORAL 2 TIMES DAILY
Qty: 14 CAPSULE | Refills: 0 | Status: SHIPPED | OUTPATIENT
Start: 2020-03-18 | End: 2020-03-18 | Stop reason: SDUPTHER

## 2020-03-18 RX ORDER — CEPHALEXIN 500 MG/1
500 CAPSULE ORAL 3 TIMES DAILY
Qty: 21 CAPSULE | Refills: 0 | Status: SHIPPED | OUTPATIENT
Start: 2020-03-18 | End: 2020-03-25

## 2020-03-18 RX ORDER — SACCHAROMYCES BOULARDII 250 MG
250 CAPSULE ORAL 2 TIMES DAILY
Qty: 14 CAPSULE | Refills: 0 | Status: SHIPPED | OUTPATIENT
Start: 2020-03-18 | End: 2020-03-25

## 2020-03-18 NOTE — PATIENT INSTRUCTIONS
Cellulitis, Adult    Cellulitis is a skin infection. The infected area is usually warm, red, swollen, and tender. This condition occurs most often in the arms and lower legs. The infection can travel to the muscles, blood, and underlying tissue and become serious. It is very important to get treated for this condition.  What are the causes?  Cellulitis is caused by bacteria. The bacteria enter through a break in the skin, such as a cut, burn, insect bite, open sore, or crack.  What increases the risk?  This condition is more likely to occur in people who:  · Have a weak body defense system (immune system).  · Have open wounds on the skin, such as cuts, burns, bites, and scrapes. Bacteria can enter the body through these open wounds.  · Are older than 60 years of age.  · Have diabetes.  · Have a type of long-lasting (chronic) liver disease (cirrhosis) or kidney disease.  · Are obese.  · Have a skin condition such as:  ? Itchy rash (eczema).  ? Slow movement of blood in the veins (venous stasis).  ? Fluid buildup below the skin (edema).  · Have had radiation therapy.  · Use IV drugs.  What are the signs or symptoms?  Symptoms of this condition include:  · Redness, streaking, or spotting on the skin.  · Swollen area of the skin.  · Tenderness or pain when an area of the skin is touched.  · Warm skin.  · A fever.  · Chills.  · Blisters.  How is this diagnosed?  This condition is diagnosed based on a medical history and physical exam. You may also have tests, including:  · Blood tests.  · Imaging tests.  How is this treated?  Treatment for this condition may include:  · Medicines, such as antibiotic medicines or medicines to treat allergies (antihistamines).  · Supportive care, such as rest and application of cold or warm cloths (compresses) to the skin.  · Hospital care, if the condition is severe.  The infection usually starts to get better within 1-2 days of treatment.  Follow these instructions at  home:    Medicines  · Take over-the-counter and prescription medicines only as told by your health care provider.  · If you were prescribed an antibiotic medicine, take it as told by your health care provider. Do not stop taking the antibiotic even if you start to feel better.  General instructions  · Drink enough fluid to keep your urine pale yellow.  · Do not touch or rub the infected area.  · Raise (elevate) the infected area above the level of your heart while you are sitting or lying down.  · Apply warm or cold compresses to the affected area as told by your health care provider.  · Keep all follow-up visits as told by your health care provider. This is important. These visits let your health care provider make sure a more serious infection is not developing.  Contact a health care provider if:  · You have a fever.  · Your symptoms do not begin to improve within 1-2 days of starting treatment.  · Your bone or joint underneath the infected area becomes painful after the skin has healed.  · Your infection returns in the same area or another area.  · You notice a swollen bump in the infected area.  · You develop new symptoms.  · You have a general ill feeling (malaise) with muscle aches and pains.  Get help right away if:  · Your symptoms get worse.  · You feel very sleepy.  · You develop vomiting or diarrhea that persists.  · You notice red streaks coming from the infected area.  · Your red area gets larger or turns dark in color.  These symptoms may represent a serious problem that is an emergency. Do not wait to see if the symptoms will go away. Get medical help right away. Call your local emergency services (911 in the [place] Do not drive yourself to the hospital.  Summary  · Cellulitis is a skin infection. This condition occurs most often in the arms and lower legs.  · Treatment for this condition may include medicines, such as antibiotic medicines or antihistamines.  · Take over-the-counter and prescription  medicines only as told by your health care provider. If you were prescribed an antibiotic medicine, do not stop taking the antibiotic even if you start to feel better.  · Contact a health care provider if your symptoms do not begin to improve within 1-2 days of starting treatment or your symptoms get worse.  · Keep all follow-up visits as told by your health care provider. This is important. These visits let your health care provider make sure that a more serious infection is not developing.  This information is not intended to replace advice given to you by your health care provider. Make sure you discuss any questions you have with your health care provider.  Document Released: 09/27/2006 Document Revised: 05/09/2019 Document Reviewed: 05/09/2019  [employer] Interactive Patient Education © 2020 [employer]

## 2020-03-18 NOTE — PROGRESS NOTES
"    Chief Complaint   Patient presents with   • Abscess     large boil under right arm, hot red and aggravated x2 days and one popped up on left side today       HPI     Ammon Vargas is a pleasant 24 y.o. female who presents for evaluation of \"chief complaint.\"  Patient states her  noticed a red spot underneath her right arm 3 days ago. Since then the area has become swollen, firm, painful, and hot to the touch. She reports a history of MRSA infections since age 11. Similar symptoms 2 years ago required 1 week of IV antibiotics. She denies fever, chills, or drainage.     Past Medical History:   Diagnosis Date   • Asthma    • Autonomic failure    • CRPS (complex regional pain syndrome type I)    • Fibromyalgia    • Fibromyalgia    • Migraine    • Migraines    • MRSA carrier    • POTS (postural orthostatic tachycardia syndrome)    • POTS (postural orthostatic tachycardia syndrome)    • Pure autonomic failure        Past Surgical History:   Procedure Laterality Date   • ADENOIDECTOMY     • CHOLECYSTECTOMY     • EAR TUBES     • TONSILLECTOMY AND ADENOIDECTOMY         Family History   Problem Relation Age of Onset   • Diabetes Mother    • Cancer Maternal Grandmother    • Diabetes Maternal Grandmother    • Kidney disease Maternal Grandmother    • Stroke Maternal Grandmother    • Cancer Maternal Grandfather    • Heart attack Maternal Grandfather    • Hearing loss Maternal Grandfather    • Stroke Maternal Grandfather    • Cancer Paternal Grandfather    • Hearing loss Paternal Grandfather    • Heart attack Paternal Grandfather    • No Known Problems Father        Social History     Socioeconomic History   • Marital status:      Spouse name: Adrian Vargas   • Number of children: Not on file   • Years of education: Not on file   • Highest education level: Not on file   Tobacco Use   • Smoking status: Never Smoker   • Smokeless tobacco: Never Used   Substance and Sexual Activity   • Alcohol use: No     " Frequency: Never     Comment: social( Pt is pregnant so not drinking until after she has her baby)   • Drug use: No   • Sexual activity: Defer     Comment: 24 weeks pregnant   Social History Narrative    [employer] in [place] with spouse       Allergies   Allergen Reactions   • Banana Anaphylaxis   • Cantaloupe (Diagnostic) Hives   • Eggs Or Egg-Derived Products Hives, Swelling and Angioedema   • Mushroom Hives   • Shellfish-Derived Products Hives   • Maxalt [Rizatriptan Benzoate] Other (See Comments)     reaction   • Rizatriptan Rash       ROS    Review of Systems   Constitutional: Negative for chills and fever.   Skin: Positive for rash and skin lesions.       Vitals:    03/18/20 1445   BP: 120/82   Pulse: 61   Temp: 98.3 °F (36.8 °C)   SpO2: 98%     Body mass index is 43.79 kg/m².      Current Outpatient Medications:   •  acetaminophen (TYLENOL) 500 MG tablet, Take 500 mg by mouth Every 6 (Six) Hours As Needed for Mild Pain  or Headache., Disp: , Rfl:   •  albuterol sulfate HFA (PROAIR HFA) 108 (90 Base) MCG/ACT inhaler, Inhale 1 puff Every 4 (Four) Hours As Needed for Wheezing., Disp: 18 g, Rfl: 0  •  bisoprolol (ZEBeta) 5 MG tablet, Take 1 tablet by mouth 2 (Two) Times a Day., Disp: 60 tablet, Rfl: 11  •  Cetirizine HCl (ZYRTEC ALLERGY PO), Take 1 tablet by mouth As Needed., Disp: , Rfl:   •  citalopram (CELEXA) 20 MG tablet, Take 1 tablet by mouth every night at bedtime., Disp: 90 tablet, Rfl: 1  •  droxidopa (NORTHERA) 300 MG capsule capsule, Take 1 capsule by mouth 3 (Three) Times a Day., Disp: 270 capsule, Rfl: 3  •  EPINEPHrine (EPIPEN 2-BJ) 0.3 MG/0.3ML solution auto-injector injection, Inject 0.3 mL into the appropriate muscle as directed by prescriber 1 (One) Time As Needed (anaphylaxis) for up to 1 dose., Disp: 2 each, Rfl: 0  •  fludrocortisone 0.1 MG tablet, Take 2 tablets by mouth Daily With Breakfast., Disp: 60 tablet, Rfl: 11  •  fluticasone (FLONASE) 50 MCG/ACT nasal spray, Use 2 sprays in each  nostril as directed by provider Daily., Disp: 16 g, Rfl: 3  •  ibuprofen (ADVIL,MOTRIN) 600 MG tablet, Take 1 tablet by mouth Every 6 (Six) Hours As Needed for Mild Pain ., Disp: 30 tablet, Rfl: 0  •  IRON PO, Take 1 tablet by mouth., Disp: , Rfl:   •  NEXPLANON 68 MG implant subdermal implant, , Disp: , Rfl:   •  NORTHERA 200 MG capsule, , Disp: , Rfl:   •  cephalexin (KEFLEX) 500 MG capsule, Take 1 capsule by mouth 2 (Two) Times a Day for 7 days., Disp: 14 capsule, Rfl: 0  •  saccharomyces boulardii (Florastor) 250 MG capsule, Take 1 capsule by mouth 2 (Two) Times a Day for 7 days., Disp: 14 capsule, Rfl: 0  •  sulfamethoxazole-trimethoprim (BACTRIM DS,SEPTRA DS) 800-160 MG per tablet, Take 1 tablet by mouth 2 (Two) Times a Day., Disp: 14 tablet, Rfl: 0    PE    Physical Exam   Constitutional: She appears well-developed and well-nourished. No distress.   Skin:        Psychiatric: She has a normal mood and affect.   Vitals reviewed.       A/P    Problem List Items Addressed This Visit     None      Visit Diagnoses     Cellulitis of right upper extremity    -  Primary  -Afebrile today. No area to I&D and nothing to culture  -Monitor cbc, esr, crp  -Will treat empirically with Bactrim and Keflex.  Patient has the tendency to get yeast infections on antibiotics. Recommended probiotic and advised her to call for diflucan if needed  -Alternate Tylenol and Ibuprofen prn for pain control  -Hx of IV abx requirement in the past. Needs referral to reestablish care with ID  -RTC in 1 week for reevaluation or sooner if needed    Relevant Orders    Ambulatory Referral to Infectious Disease    CBC Auto Differential    C-reactive Protein    Sedimentation Rate          Plan of care was reviewed with patient at the conclusion of today's visit. Education was provided regarding diagnoses, management, prescribed or recommended OTC products, and the importance of compliance with follow-up appointments. The patient was counseled  regarding the risks, benefits, and possible side-effects of treatment. I advised the patient to keep me informed of any acute changes in their status including new, worsening, or persistent symptoms. Patient expresses understanding and agreement with the management plan.        VERONICA Rosas

## 2020-03-19 NOTE — PROGRESS NOTES
I have reviewed the notes, assessments, and/or procedures performed by Eb Prabhakar, I concur with her/his documentation of Ammon Vargas.

## 2020-03-23 ENCOUNTER — SPECIALTY PHARMACY (OUTPATIENT)
Dept: ONCOLOGY | Facility: HOSPITAL | Age: 25
End: 2020-03-23

## 2020-03-24 ENCOUNTER — SPECIALTY PHARMACY (OUTPATIENT)
Dept: ONCOLOGY | Facility: HOSPITAL | Age: 25
End: 2020-03-24

## 2020-03-24 NOTE — PROGRESS NOTES
Mailed written educational material for Aimovig. Provided with my contact information and instructed to call if any questions/concerns should arise.

## 2020-04-22 RX ORDER — CITALOPRAM 20 MG/1
20 TABLET ORAL
Qty: 90 TABLET | Refills: 1 | Status: SHIPPED | OUTPATIENT
Start: 2020-04-22 | End: 2020-10-28 | Stop reason: SDUPTHER

## 2020-05-06 ENCOUNTER — OFFICE VISIT (OUTPATIENT)
Dept: FAMILY MEDICINE CLINIC | Facility: CLINIC | Age: 25
End: 2020-05-06

## 2020-05-06 VITALS
SYSTOLIC BLOOD PRESSURE: 122 MMHG | WEIGHT: 293 LBS | HEART RATE: 79 BPM | BODY MASS INDEX: 41.02 KG/M2 | DIASTOLIC BLOOD PRESSURE: 76 MMHG | OXYGEN SATURATION: 98 % | HEIGHT: 71 IN

## 2020-05-06 DIAGNOSIS — E66.01 CLASS 3 SEVERE OBESITY DUE TO EXCESS CALORIES WITH BODY MASS INDEX (BMI) OF 45.0 TO 49.9 IN ADULT, UNSPECIFIED WHETHER SERIOUS COMORBIDITY PRESENT (HCC): Primary | ICD-10-CM

## 2020-05-06 PROCEDURE — 99213 OFFICE O/P EST LOW 20 MIN: CPT | Performed by: PHYSICIAN ASSISTANT

## 2020-05-06 NOTE — PROGRESS NOTES
Chief Complaint   Patient presents with   • Follow-up     3 mth follow up       HPI     Ammon Vargas is a pleasant 24 y.o. female who is here for 3 month follow-up for weight. Patient states she was going to the gym and eating healthier until the COVID-19 outbreak. She now has reduced hours at work and finances have made buying healthier food difficult. She has not lost weight like she would have liked. She is up 9 pounds. She is walking 30 minutes every night when the weather is nice. She has otherwise felt well. Zyrtec is helping allergies.     Past Medical History:   Diagnosis Date   • Asthma    • Autonomic failure    • CRPS (complex regional pain syndrome type I)    • Fibromyalgia    • Fibromyalgia    • Migraine    • Migraines    • MRSA carrier    • POTS (postural orthostatic tachycardia syndrome)    • POTS (postural orthostatic tachycardia syndrome)    • Pure autonomic failure        Past Surgical History:   Procedure Laterality Date   • ADENOIDECTOMY     • CHOLECYSTECTOMY     • EAR TUBES     • TONSILLECTOMY AND ADENOIDECTOMY         Family History   Problem Relation Age of Onset   • Diabetes Mother    • Cancer Maternal Grandmother    • Diabetes Maternal Grandmother    • Kidney disease Maternal Grandmother    • Stroke Maternal Grandmother    • Cancer Maternal Grandfather    • Heart attack Maternal Grandfather    • Hearing loss Maternal Grandfather    • Stroke Maternal Grandfather    • Cancer Paternal Grandfather    • Hearing loss Paternal Grandfather    • Heart attack Paternal Grandfather    • No Known Problems Father        Social History     Socioeconomic History   • Marital status:      Spouse name: Adrian Vargas   • Number of children: Not on file   • Years of education: Not on file   • Highest education level: Not on file   Tobacco Use   • Smoking status: Never Smoker   • Smokeless tobacco: Never Used   Substance and Sexual Activity   • Alcohol use: No     Frequency: Never     Comment: social(  Pt is pregnant so not drinking until after she has her baby)   • Drug use: No   • Sexual activity: Defer     Comment: 24 weeks pregnant   Social History Narrative    [employer] in [place] with spouse       Allergies   Allergen Reactions   • Banana Anaphylaxis   • Cantaloupe (Diagnostic) Hives   • Eggs Or Egg-Derived Products Hives, Swelling and Angioedema   • Mushroom Hives   • Shellfish-Derived Products Hives   • Maxalt [Rizatriptan Benzoate] Other (See Comments)     reaction   • Rizatriptan Rash       ROS    Review of Systems   Constitutional: Positive for fatigue.       Vitals:    05/06/20 1525   BP: 122/76   Pulse: 79   SpO2: 98%     Body mass index is 45.05 kg/m².      Current Outpatient Medications:   •  acetaminophen (TYLENOL) 500 MG tablet, Take 500 mg by mouth Every 6 (Six) Hours As Needed for Mild Pain  or Headache., Disp: , Rfl:   •  albuterol sulfate HFA (PROAIR HFA) 108 (90 Base) MCG/ACT inhaler, Inhale 1 puff Every 4 (Four) Hours As Needed for Wheezing., Disp: 18 g, Rfl: 0  •  bisoprolol (ZEBeta) 5 MG tablet, Take 1 tablet by mouth 2 (Two) Times a Day., Disp: 60 tablet, Rfl: 11  •  cetirizine (zyrTEC) 10 MG tablet, Take 1 tablet by mouth Daily., Disp: 30 tablet, Rfl: 11  •  citalopram (CeleXA) 20 MG tablet, Take 1 tablet by mouth every night at bedtime., Disp: 90 tablet, Rfl: 1  •  droxidopa (NORTHERA) 300 MG capsule capsule, Take 1 capsule by mouth 3 (Three) Times a Day., Disp: 270 capsule, Rfl: 3  •  EPINEPHrine (EPIPEN 2-BJ) 0.3 MG/0.3ML solution auto-injector injection, Inject 0.3 mL into the appropriate muscle as directed by prescriber 1 (One) Time As Needed (anaphylaxis) for up to 1 dose., Disp: 2 each, Rfl: 0  •  Erenumab-aooe 140 MG/ML solution auto-injector, Inject 1 mL under the skin into the appropriate area as directed Every 28 (Twenty-Eight) Days., Disp: 1 mL, Rfl: 11  •  fludrocortisone 0.1 MG tablet, Take 2 tablets by mouth Daily With Breakfast., Disp: 60 tablet, Rfl: 11  •  fluticasone  (FLONASE) 50 MCG/ACT nasal spray, Use 2 sprays in each nostril as directed by provider Daily., Disp: 16 g, Rfl: 3  •  ibuprofen (ADVIL,MOTRIN) 600 MG tablet, Take 1 tablet by mouth Every 6 (Six) Hours As Needed for Mild Pain ., Disp: 30 tablet, Rfl: 0  •  IRON PO, Take 1 tablet by mouth., Disp: , Rfl:   •  minocycline (MINOCIN,DYNACIN) 100 MG capsule, Take 1 capsule by mouth Daily. (Patient taking differently: Take 100 mg by mouth Daily. Three time a week), Disp: 30 capsule, Rfl: 4  •  NEXPLANON 68 MG implant subdermal implant, , Disp: , Rfl:   •  NORTHERA 200 MG capsule, , Disp: , Rfl:   •  sulfamethoxazole-trimethoprim (BACTRIM DS,SEPTRA DS) 800-160 MG per tablet, Take 1 tablet by mouth 2 (Two) Times a Day., Disp: 14 tablet, Rfl: 0    PE    Physical Exam   Constitutional: She appears well-developed and well-nourished. No distress.   Pulmonary/Chest: Effort normal. No respiratory distress.   Psychiatric: She has a normal mood and affect. Her behavior is normal.       Results    A/P    Problem List Items Addressed This Visit        Digestive    Class 3 severe obesity due to excess calories with body mass index (BMI) of 45.0 to 49.9 in adult (CMS/[facility]) - Primary  -Patient expresses the desire to improve her lifestyle and seems motivated. She wants to try open faced sandwiches for lunch and replace chips with fruit or vegetables as a snack. Continue walking. Discussed weight loss goal of 1/2 pound/week.   -RTC in 3 months for weight check          Plan of care was reviewed with patient at the conclusion of today's visit. Education was provided regarding diagnoses, management, prescribed or recommended OTC products, and the importance of compliance with follow-up appointments. The patient was counseled regarding the risks, benefits, and possible side-effects of treatment. Patient expresses understanding and agreement with the management plan.        VERONICA Rosas

## 2020-05-26 RX ORDER — FLUDROCORTISONE ACETATE 0.1 MG/1
0.2 TABLET ORAL
Qty: 60 TABLET | Refills: 11 | Status: SHIPPED | OUTPATIENT
Start: 2020-05-26 | End: 2021-10-08 | Stop reason: SDUPTHER

## 2020-06-26 ENCOUNTER — SPECIALTY PHARMACY (OUTPATIENT)
Dept: ONCOLOGY | Facility: HOSPITAL | Age: 25
End: 2020-06-26

## 2020-06-26 NOTE — PROGRESS NOTES
Injectable Neurology Medication Follow-Up        Patient Name/:     Ammon Vargas    1995  Injectable Neurology Medication Regimen:  Aimovig 140mg SQ monthly  Date Started Medication: 3/2/2020               Initial Teaching Follow Up Comments      Safety      Storage instructions (away from children; away from heat/cold, sunlight, or moisture)       “How are you storing your medications?”, reminders on storage, proper handling (away from children, managing waste, etc.), disposal of medication with D/C or dosage change     Patient reports appropriate storage and handling.       Adherence       patient and/or caregiver on how to take medication, take with/without food, assess their adherence potential, stress importance of adherence, ways to manage adherence (pill boxes, phone reminders, calendars), what to do if miss a dose    “How are you taking your medication?” “How are you remembering to take your medication?”, “How many doses have you missed?”, determine reasons for non-adherence (not remembering, side effects, etc), ways to improve, overadherence? Remind patient of ways to improve/maintain adherence Confirmed dose of Aimovig 140mg SQ monthly. Discussed importance of compliance. Refill processed at [facility] and will be picked up by patient.     Side Effects/Adverse Reactions       patient on potential side effects, s/s, ways to manage, when to call MD/seek help       Determine if patient experiencing side effects, ways to manage  Pt denies adverse effects or issues with medication administration.      Miscellaneous      Food interactions, DDIs, financial issues Determine if patient started any new medications (analyze for DDI)       Additional Notes: Discussed aforementioned material with patient by phone. All questions and concerns addressed. Patient provided with my contact information and instructed to call if any additional questions arise. Notified [facility] of refill request.

## 2020-07-23 RX ORDER — DROXIDOPA 300 MG/1
CAPSULE ORAL
Qty: 90 CAPSULE | Refills: 1 | Status: SHIPPED | OUTPATIENT
Start: 2020-07-23 | End: 2020-08-03

## 2020-07-23 RX ORDER — DROXIDOPA 200 MG/1
CAPSULE ORAL
Qty: 90 CAPSULE | Refills: 1 | Status: SHIPPED | OUTPATIENT
Start: 2020-07-23 | End: 2020-08-03

## 2020-08-03 RX ORDER — DROXIDOPA 200 MG/1
CAPSULE ORAL
Qty: 90 CAPSULE | Refills: 11 | Status: SHIPPED | OUTPATIENT
Start: 2020-08-03 | End: 2021-07-09 | Stop reason: SDUPTHER

## 2020-08-03 RX ORDER — DROXIDOPA 300 MG/1
CAPSULE ORAL
Qty: 90 CAPSULE | Refills: 2 | Status: SHIPPED | OUTPATIENT
Start: 2020-08-03 | End: 2021-07-09 | Stop reason: SDUPTHER

## 2020-08-24 RX ORDER — BISOPROLOL FUMARATE 5 MG/1
5 TABLET, FILM COATED ORAL 2 TIMES DAILY
Qty: 60 TABLET | Refills: 11 | Status: SHIPPED | OUTPATIENT
Start: 2020-08-24 | End: 2021-10-08 | Stop reason: SDUPTHER

## 2020-09-21 ENCOUNTER — SPECIALTY PHARMACY (OUTPATIENT)
Dept: ONCOLOGY | Facility: HOSPITAL | Age: 25
End: 2020-09-21

## 2020-09-21 NOTE — PROGRESS NOTES
Injectable Neurology Medication Follow-Up        Patient Name/:     Ammon Vargas    1995  Injectable Neurology Medication Regimen:  Aimovig 140mg SQ monthly  Date Started Medication: 3/2/2020               Initial Teaching Follow Up Comments      Safety      Storage instructions (away from children; away from heat/cold, sunlight, or moisture)       “How are you storing your medications?”, reminders on storage, proper handling (away from children, managing waste, etc.), disposal of medication with D/C or dosage change     Patient reports appropriate storage and handling.       Adherence       patient and/or caregiver on how to take medication, take with/without food, assess their adherence potential, stress importance of adherence, ways to manage adherence (pill boxes, phone reminders, calendars), what to do if miss a dose    “How are you taking your medication?” “How are you remembering to take your medication?”, “How many doses have you missed?”, determine reasons for non-adherence (not remembering, side effects, etc), ways to improve, overadherence? Remind patient of ways to improve/maintain adherence Confirmed dose of Aimovig 140mg SQ monthly. Discussed importance of compliance. Refill processed at [facility]: [facility] and will be picked up by patient.     Side Effects/Adverse Reactions       patient on potential side effects, s/s, ways to manage, when to call MD/seek help       Determine if patient experiencing side effects, ways to manage  Pt denies adverse effects or issues with medication administration.      Miscellaneous      Food interactions, DDIs, financial issues Determine if patient started any new medications (analyze for DDI)       Additional Notes: Discussed aforementioned material with patient by phone. All questions and concerns addressed. Patient provided with my contact information and instructed to call if any additional questions arise. Notified [facility] of refill request.

## 2020-10-05 PROCEDURE — U0003 INFECTIOUS AGENT DETECTION BY NUCLEIC ACID (DNA OR RNA); SEVERE ACUTE RESPIRATORY SYNDROME CORONAVIRUS 2 (SARS-COV-2) (CORONAVIRUS DISEASE [COVID-19]), AMPLIFIED PROBE TECHNIQUE, MAKING USE OF HIGH THROUGHPUT TECHNOLOGIES AS DESCRIBED BY CMS-2020-01-R: HCPCS | Performed by: FAMILY MEDICINE

## 2020-10-08 ENCOUNTER — TELEPHONE (OUTPATIENT)
Dept: FAMILY MEDICINE CLINIC | Facility: CLINIC | Age: 25
End: 2020-10-08

## 2020-10-08 ENCOUNTER — TELEPHONE (OUTPATIENT)
Dept: URGENT CARE | Facility: CLINIC | Age: 25
End: 2020-10-08

## 2020-10-08 NOTE — TELEPHONE ENCOUNTER
PT MOTHER CALLED STATED THAT PT TEST POSITIVE FOR COVID YESTERDAY, PT HAS A TERRIBLE COUGH AND RUNNY NOSE, BELIEVES THAT SHE HAS BRONCHITIS AND REQUEST RX TO BE SENT TO PHARMACY, OR TO SEE WHAT DR SUGGEST.  MOTHER  WOULD LIKE CALL BACK.    PLEASE ADVISE.  CALL BACK: [phone]

## 2020-10-08 NOTE — TELEPHONE ENCOUNTER
Patient will need a video visit to evaluate further.  Generally no antibiotics are indicated for a viral infection as they won't help.  ida will be back tomorrow and patient can schedule with him or another provider if available this afternoon.  If she develops shortness of breath, go to ER.

## 2020-10-29 ENCOUNTER — TELEPHONE (OUTPATIENT)
Dept: CARDIOLOGY | Facility: CLINIC | Age: 25
End: 2020-10-29

## 2020-10-29 RX ORDER — CITALOPRAM 20 MG/1
20 TABLET ORAL
Qty: 90 TABLET | Refills: 0 | Status: SHIPPED | OUTPATIENT
Start: 2020-10-29 | End: 2021-03-30 | Stop reason: SDUPTHER

## 2020-11-03 ENCOUNTER — TELEPHONE (OUTPATIENT)
Dept: CARDIOLOGY | Facility: CLINIC | Age: 25
End: 2020-11-03

## 2020-11-25 ENCOUNTER — SPECIALTY PHARMACY (OUTPATIENT)
Dept: ONCOLOGY | Facility: HOSPITAL | Age: 25
End: 2020-11-25

## 2020-11-25 NOTE — PROGRESS NOTES
Injectable Neurology Medication Follow-Up        Patient Name/:     Ammon Vargas    1995  Injectable Neurology Medication Regimen:  Aimovig 140mg SQ monthly  Date Started Medication: 3/2/2020               Initial Teaching Follow Up Comments      Safety      Storage instructions (away from children; away from heat/cold, sunlight, or moisture)       “How are you storing your medications?”, reminders on storage, proper handling (away from children, managing waste, etc.), disposal of medication with D/C or dosage change     Patient reports appropriate storage and handling.       Adherence       patient and/or caregiver on how to take medication, take with/without food, assess their adherence potential, stress importance of adherence, ways to manage adherence (pill boxes, phone reminders, calendars), what to do if miss a dose    “How are you taking your medication?” “How are you remembering to take your medication?”, “How many doses have you missed?”, determine reasons for non-adherence (not remembering, side effects, etc), ways to improve, overadherence? Remind patient of ways to improve/maintain adherence Confirmed dose of Aimovig 140mg SQ monthly. Discussed importance of compliance. Refill processed at [facility] and will be picked up by patient.     Side Effects/Adverse Reactions       patient on potential side effects, s/s, ways to manage, when to call MD/seek help       Determine if patient experiencing side effects, ways to manage  Pt denies adverse effects or issues with medication administration.      Miscellaneous      Food interactions, DDIs, financial issues Determine if patient started any new medications (analyze for DDI)       Additional Notes: Discussed aforementioned material with patient by phone. All questions and concerns addressed. Patient provided with my contact information and instructed to call if any additional questions arise. Notified [facility] of refill request.

## 2020-11-30 ENCOUNTER — TELEPHONE (OUTPATIENT)
Dept: OBSTETRICS AND GYNECOLOGY | Facility: CLINIC | Age: 25
End: 2020-11-30

## 2020-11-30 NOTE — TELEPHONE ENCOUNTER
Patients mother called-needs records of pap smears from 2018, 2019, 2020 for cancer policy with insurance.     Will fax att:Danya at [phone]

## 2021-01-01 ENCOUNTER — HOSPITAL ENCOUNTER (EMERGENCY)
Facility: HOSPITAL | Age: 26
Discharge: HOME | End: 2021-01-01
Attending: EMERGENCY MEDICINE | Admitting: EMERGENCY MEDICINE

## 2021-01-01 ENCOUNTER — APPOINTMENT (OUTPATIENT)
Dept: CT IMAGING | Facility: HOSPITAL | Age: 26
End: 2021-01-01

## 2021-01-01 VITALS
WEIGHT: 293 LBS | HEART RATE: 61 BPM | DIASTOLIC BLOOD PRESSURE: 85 MMHG | BODY MASS INDEX: 39.68 KG/M2 | SYSTOLIC BLOOD PRESSURE: 137 MMHG | OXYGEN SATURATION: 93 % | HEIGHT: 72 IN | RESPIRATION RATE: 18 BRPM | TEMPERATURE: 97.7 F

## 2021-01-01 DIAGNOSIS — R11.10 VOMITING AND DIARRHEA: ICD-10-CM

## 2021-01-01 DIAGNOSIS — R10.9 ACUTE ABDOMINAL PAIN: Primary | ICD-10-CM

## 2021-01-01 DIAGNOSIS — R19.7 VOMITING AND DIARRHEA: ICD-10-CM

## 2021-01-01 LAB
ALBUMIN SERPL-MCNC: 4.1 G/DL (ref 3.5–5.2)
ALBUMIN/GLOB SERPL: 1.1 G/DL
ALP SERPL-CCNC: 146 U/L (ref 39–117)
ALT SERPL W P-5'-P-CCNC: 18 U/L (ref 1–33)
ANION GAP SERPL CALCULATED.3IONS-SCNC: 12 MMOL/L (ref 5–15)
AST SERPL-CCNC: 14 U/L (ref 1–32)
B-HCG UR QL: NEGATIVE
BACTERIA UR QL AUTO: ABNORMAL /HPF
BASOPHILS # BLD AUTO: 0.03 10*3/MM3 (ref 0–0.2)
BASOPHILS NFR BLD AUTO: 0.3 % (ref 0–1.5)
BILIRUB SERPL-MCNC: 0.4 MG/DL (ref 0–1.2)
BILIRUB UR QL STRIP: NEGATIVE
BUN SERPL-MCNC: 6 MG/DL (ref 6–20)
BUN/CREAT SERPL: 10.5 (ref 7–25)
CALCIUM SPEC-SCNC: 9.2 MG/DL (ref 8.6–10.5)
CHLORIDE SERPL-SCNC: 102 MMOL/L (ref 98–107)
CLARITY UR: ABNORMAL
CO2 SERPL-SCNC: 22 MMOL/L (ref 22–29)
COLOR UR: YELLOW
CREAT SERPL-MCNC: 0.57 MG/DL (ref 0.57–1)
DEPRECATED RDW RBC AUTO: 41.7 FL (ref 37–54)
EOSINOPHIL # BLD AUTO: 0.29 10*3/MM3 (ref 0–0.4)
EOSINOPHIL NFR BLD AUTO: 2.6 % (ref 0.3–6.2)
ERYTHROCYTE [DISTWIDTH] IN BLOOD BY AUTOMATED COUNT: 15.2 % (ref 12.3–15.4)
GFR SERPL CREATININE-BSD FRML MDRD: 129 ML/MIN/1.73
GLOBULIN UR ELPH-MCNC: 3.7 GM/DL
GLUCOSE SERPL-MCNC: 116 MG/DL (ref 65–99)
GLUCOSE UR STRIP-MCNC: NEGATIVE MG/DL
HCT VFR BLD AUTO: 40.8 % (ref 34–46.6)
HGB BLD-MCNC: 12.5 G/DL (ref 12–15.9)
HGB UR QL STRIP.AUTO: NEGATIVE
HOLD SPECIMEN: NORMAL
HOLD SPECIMEN: NORMAL
IMM GRANULOCYTES # BLD AUTO: 0.03 10*3/MM3 (ref 0–0.05)
IMM GRANULOCYTES NFR BLD AUTO: 0.3 % (ref 0–0.5)
INTERNAL NEGATIVE CONTROL: NEGATIVE
INTERNAL POSITIVE CONTROL: POSITIVE
KETONES UR QL STRIP: NEGATIVE
LEUKOCYTE ESTERASE UR QL STRIP.AUTO: ABNORMAL
LIPASE SERPL-CCNC: 15 U/L (ref 13–60)
LYMPHOCYTES # BLD AUTO: 1.63 10*3/MM3 (ref 0.7–3.1)
LYMPHOCYTES NFR BLD AUTO: 14.5 % (ref 19.6–45.3)
Lab: NORMAL
MCH RBC QN AUTO: 23.2 PG (ref 26.6–33)
MCHC RBC AUTO-ENTMCNC: 30.6 G/DL (ref 31.5–35.7)
MCV RBC AUTO: 75.8 FL (ref 79–97)
MONOCYTES # BLD AUTO: 0.55 10*3/MM3 (ref 0.1–0.9)
MONOCYTES NFR BLD AUTO: 4.9 % (ref 5–12)
NEUTROPHILS NFR BLD AUTO: 77.4 % (ref 42.7–76)
NEUTROPHILS NFR BLD AUTO: 8.68 10*3/MM3 (ref 1.7–7)
NITRITE UR QL STRIP: NEGATIVE
NRBC BLD AUTO-RTO: 0 /100 WBC (ref 0–0.2)
PH UR STRIP.AUTO: <=5 [PH] (ref 5–8)
PLATELET # BLD AUTO: 481 10*3/MM3 (ref 140–450)
PMV BLD AUTO: 9 FL (ref 6–12)
POTASSIUM SERPL-SCNC: 3.9 MMOL/L (ref 3.5–5.2)
PROT SERPL-MCNC: 7.8 G/DL (ref 6–8.5)
PROT UR QL STRIP: NEGATIVE
RBC # BLD AUTO: 5.38 10*6/MM3 (ref 3.77–5.28)
RBC # UR: ABNORMAL /HPF
REF LAB TEST METHOD: ABNORMAL
SODIUM SERPL-SCNC: 136 MMOL/L (ref 136–145)
SP GR UR STRIP: 1.08 (ref 1–1.03)
SQUAMOUS #/AREA URNS HPF: ABNORMAL /HPF
UROBILINOGEN UR QL STRIP: ABNORMAL
WBC # BLD AUTO: 11.21 10*3/MM3 (ref 3.4–10.8)
WBC UR QL AUTO: ABNORMAL /HPF
WHOLE BLOOD HOLD SPECIMEN: NORMAL
WHOLE BLOOD HOLD SPECIMEN: NORMAL

## 2021-01-01 PROCEDURE — 83690 ASSAY OF LIPASE: CPT

## 2021-01-01 PROCEDURE — 96375 TX/PRO/DX INJ NEW DRUG ADDON: CPT

## 2021-01-01 PROCEDURE — 96374 THER/PROPH/DIAG INJ IV PUSH: CPT

## 2021-01-01 PROCEDURE — 25010000002 IOPAMIDOL 61 % SOLUTION: Performed by: EMERGENCY MEDICINE

## 2021-01-01 PROCEDURE — 25010000002 ONDANSETRON PER 1 MG: Performed by: EMERGENCY MEDICINE

## 2021-01-01 PROCEDURE — 81025 URINE PREGNANCY TEST: CPT | Performed by: EMERGENCY MEDICINE

## 2021-01-01 PROCEDURE — 25010000002 PROCHLORPERAZINE 10 MG/2ML SOLUTION: Performed by: NURSE PRACTITIONER

## 2021-01-01 PROCEDURE — 87086 URINE CULTURE/COLONY COUNT: CPT | Performed by: NURSE PRACTITIONER

## 2021-01-01 PROCEDURE — 80053 COMPREHEN METABOLIC PANEL: CPT

## 2021-01-01 PROCEDURE — 81001 URINALYSIS AUTO W/SCOPE: CPT | Performed by: EMERGENCY MEDICINE

## 2021-01-01 PROCEDURE — 74177 CT ABD & PELVIS W/CONTRAST: CPT

## 2021-01-01 PROCEDURE — 99283 EMERGENCY DEPT VISIT LOW MDM: CPT

## 2021-01-01 PROCEDURE — 85025 COMPLETE CBC W/AUTO DIFF WBC: CPT

## 2021-01-01 PROCEDURE — 25010000002 HYDROMORPHONE PER 4 MG: Performed by: EMERGENCY MEDICINE

## 2021-01-01 RX ORDER — PROCHLORPERAZINE EDISYLATE 5 MG/ML
10 INJECTION INTRAMUSCULAR; INTRAVENOUS EVERY 6 HOURS PRN
Status: DISCONTINUED | OUTPATIENT
Start: 2021-01-01 | End: 2021-01-02 | Stop reason: HOSPADM

## 2021-01-01 RX ORDER — SODIUM CHLORIDE 9 MG/ML
10 INJECTION INTRAVENOUS AS NEEDED
Status: DISCONTINUED | OUTPATIENT
Start: 2021-01-01 | End: 2021-01-02 | Stop reason: HOSPADM

## 2021-01-01 RX ORDER — ONDANSETRON 4 MG/1
4 TABLET, ORALLY DISINTEGRATING ORAL EVERY 6 HOURS PRN
Qty: 12 TABLET | Refills: 0 | Status: SHIPPED | OUTPATIENT
Start: 2021-01-01 | End: 2021-01-18 | Stop reason: SDUPTHER

## 2021-01-01 RX ORDER — ONDANSETRON 2 MG/ML
4 INJECTION INTRAMUSCULAR; INTRAVENOUS ONCE
Status: COMPLETED | OUTPATIENT
Start: 2021-01-01 | End: 2021-01-01

## 2021-01-01 RX ORDER — HYDROMORPHONE HYDROCHLORIDE 1 MG/ML
0.5 INJECTION, SOLUTION INTRAMUSCULAR; INTRAVENOUS; SUBCUTANEOUS ONCE
Status: COMPLETED | OUTPATIENT
Start: 2021-01-01 | End: 2021-01-01

## 2021-01-01 RX ADMIN — HYDROMORPHONE HYDROCHLORIDE 0.5 MG: 1 INJECTION, SOLUTION INTRAMUSCULAR; INTRAVENOUS; SUBCUTANEOUS at 18:56

## 2021-01-01 RX ADMIN — SODIUM CHLORIDE 1000 ML: 9 INJECTION, SOLUTION INTRAVENOUS at 18:55

## 2021-01-01 RX ADMIN — ONDANSETRON 4 MG: 2 INJECTION INTRAMUSCULAR; INTRAVENOUS at 18:56

## 2021-01-01 RX ADMIN — IOPAMIDOL 100 ML: 612 INJECTION, SOLUTION INTRAVENOUS at 19:40

## 2021-01-01 RX ADMIN — PROCHLORPERAZINE EDISYLATE 10 MG: 5 INJECTION INTRAMUSCULAR; INTRAVENOUS at 20:19

## 2021-01-02 NOTE — DISCHARGE INSTRUCTIONS
Please call our emergency department a [phone] in the next 2 to 3 days for the results of your urinary culture.  If you admit feeling better in the next several days and still do not have any urinary symptoms I do recommend a repeat urinalysis at your regular doctor.  Please call us earlier for painful urination burning etc.

## 2021-01-02 NOTE — ED PROVIDER NOTES
Subjective   Patient presents to the emergency department for left lower abdominal pain as well as nausea vomiting diarrhea for the last 2 days.  She denies fever chest pain.  She reports having Covid back in October.  She has no difficulty breathing.      Abdominal Pain  Pain location:  LLQ  Pain quality: aching and cramping    Pain radiates to:  Does not radiate  Pain severity:  Moderate  Onset quality:  Gradual  Duration:  2 days  Timing:  Constant  Progression:  Worsening  Chronicity:  New  Relieved by:  Nothing  Worsened by:  Eating and movement  Associated symptoms: nausea and vomiting    Associated symptoms: no chest pain, no chills, no constipation, no cough, no diarrhea, no dysuria, no fever, no hematuria, no shortness of breath and no sore throat        Review of Systems   Constitutional: Negative for chills, diaphoresis and fever.   HENT: Negative for congestion and sore throat.    Respiratory: Negative for cough, choking, chest tightness, shortness of breath and wheezing.    Cardiovascular: Negative for chest pain and leg swelling.   Gastrointestinal: Positive for abdominal pain, nausea and vomiting. Negative for abdominal distention, anal bleeding, blood in stool, constipation and diarrhea.   Genitourinary: Negative for difficulty urinating, dysuria, flank pain, frequency, hematuria and urgency.   All other systems reviewed and are negative.      Past Medical History:   Diagnosis Date   • Asthma    • Autonomic failure    • CRPS (complex regional pain syndrome type I)    • Fibromyalgia    • Fibromyalgia    • Migraine    • Migraines    • MRSA carrier    • POTS (postural orthostatic tachycardia syndrome)    • POTS (postural orthostatic tachycardia syndrome)    • Pure autonomic failure        Allergies   Allergen Reactions   • Banana Anaphylaxis   • Cantaloupe (Diagnostic) Hives   • Eggs Or Egg-Derived Products Hives, Swelling and Angioedema   • Mushroom Hives   • Shellfish-Derived Products Hives   • Maxalt  [Rizatriptan Benzoate] Other (See Comments)     reaction   • Rizatriptan Rash       Past Surgical History:   Procedure Laterality Date   • ADENOIDECTOMY     • CHOLECYSTECTOMY     • EAR TUBES     • TONSILLECTOMY AND ADENOIDECTOMY         Family History   Problem Relation Age of Onset   • Diabetes Mother    • Cancer Maternal Grandmother    • Diabetes Maternal Grandmother    • Kidney disease Maternal Grandmother    • Stroke Maternal Grandmother    • Cancer Maternal Grandfather    • Heart attack Maternal Grandfather    • Hearing loss Maternal Grandfather    • Stroke Maternal Grandfather    • Cancer Paternal Grandfather    • Hearing loss Paternal Grandfather    • Heart attack Paternal Grandfather    • No Known Problems Father        Social History     Socioeconomic History   • Marital status:      Spouse name: Adrian Vargas   • Number of children: Not on file   • Years of education: Not on file   • Highest education level: Not on file   Tobacco Use   • Smoking status: Never Smoker   • Smokeless tobacco: Never Used   Substance and Sexual Activity   • Alcohol use: No     Frequency: Never     Comment: SOCIAL   • Drug use: No   • Sexual activity: Defer   Social History Narrative    [place]           Objective   Physical Exam  Constitutional:       Appearance: She is well-developed.   HENT:      Head: Normocephalic and atraumatic.      Right Ear: External ear normal.      Left Ear: External ear normal.      Nose: Nose normal.   Eyes:      Conjunctiva/sclera: Conjunctivae normal.      Pupils: Pupils are equal, round, and reactive to light.   Neck:      Musculoskeletal: Normal range of motion and neck supple.   Cardiovascular:      Rate and Rhythm: Normal rate and regular rhythm.      Heart sounds: Normal heart sounds.   Pulmonary:      Effort: Pulmonary effort is normal.      Breath sounds: Normal breath sounds.   Abdominal:      General: Bowel sounds are normal.      Palpations: Abdomen is soft.       Tenderness: There is abdominal tenderness in the left lower quadrant.   Musculoskeletal: Normal range of motion.   Skin:     General: Skin is warm and dry.   Neurological:      Mental Status: She is alert and oriented to person, place, and time.   Psychiatric:         Behavior: Behavior normal.         Thought Content: Thought content normal.         Judgment: Judgment normal.         Procedures           ED Course  ED Course as of Jan 01 2255 Fri Jan 01, 2021 1925 Await[address] CT    [JM]   2204 Awaiting UA    []   2251 The patient does not have any urinary symptoms.  There is no frequency there is no dysuria.  She feels much better and she is very eager to go home.  Given her vomiting and diarrhea I am very hesitant to put her on any antibiotics given her urine findings and asymptomatic urinary symptoms.  I will culture her urine and advised her to call back for the results.  Even then if she is still having no symptoms I would recommend repeat collection.  I will give her some Zofran.  All thankful and agreeable.  Well aware the signs and worse condition.    []      ED Course User Index  [] Zain Escalera APRN           CT Abdomen Pelvis With Contrast   Final Result   Normal study      Prior cholecystectomy               Signer Name: Marc Ingram MD    Signed: 1/1/2021 7:52 PM    Workstation Name: [facility]     [facility]             DISCHARGE    Patient discharged in stable condition.    Reviewed implications of results, diagnosis, meds, responsibility to follow up, warning signs and symptoms of possible worsening, potential complications and reasons to return to ER.    Patient/Family voiced understanding of above instructions.    Discussed plan for discharge, as there is no emergent indication for admission.  Pt/family is agreeable and understands need for follow up and possible repeat testing.  Pt/family is aware that discharge does not mean that nothing is wrong but that it  indicates no emergency is currently present that requires admission and they must continue care with follow-up as given below or with a physician of their choice.     FOLLOW-UP  Eb Prabhakar PA  [address] [address]  [phone]    Schedule an appointment as soon as possible for a visit       [facility] Emergency Department  [address] [address] [phone]    If symptoms worsen         Medication List      New Prescriptions    ondansetron ODT 4 MG disintegrating tablet  Commonly known as: ZOFRAN-ODT  Place 1 tablet on the tongue Every 6 (Six) Hours As Needed for Nausea or Vomiting.           Where to Get Your Medications      These medications were sent to [facility]  [address] [place] [address]    Hours: 7:00 AM-5:30 PM M-F, 8:00 AM-4:30 PM Sat-Sun Phone: [phone]   · ondansetron ODT 4 MG disintegrating tablet                                  MDM    Final diagnoses:   Acute abdominal pain   Vomiting and diarrhea            Zain Escalera, APRN  01/01/21 3874

## 2021-01-03 LAB — BACTERIA SPEC AEROBE CULT: NORMAL

## 2021-01-08 ENCOUNTER — SPECIALTY PHARMACY (OUTPATIENT)
Dept: ONCOLOGY | Facility: HOSPITAL | Age: 26
End: 2021-01-08

## 2021-01-08 NOTE — PROGRESS NOTES
Injectable Neurology Medication Follow-Up        Patient Name/:     Ammon Vargas    1995  Injectable Neurology Medication Regimen:  Aimovig 140mg SQ Q28 days  Date Started Medication: 3/2/2020               Initial Teaching Follow Up Comments      Safety      Storage instructions (away from children; away from heat/cold, sunlight, or moisture)       “How are you storing your medications?”, reminders on storage, proper handling (away from children, managing waste, etc.), disposal of medication with D/C or dosage change     Patient reports appropriate storage and handling.       Adherence       patient and/or caregiver on how to take medication, take with/without food, assess their adherence potential, stress importance of adherence, ways to manage adherence (pill boxes, phone reminders, calendars), what to do if miss a dose    “How are you taking your medication?” “How are you remembering to take your medication?”, “How many doses have you missed?”, determine reasons for non-adherence (not remembering, side effects, etc), ways to improve, overadherence? Remind patient of ways to improve/maintain adherence Confirmed dose of Aimovig 140mg SQ Q28 days. Discussed importance of compliance. Refill processed at [email or web address] and will be picked up by patient.     Side Effects/Adverse Reactions       patient on potential side effects, s/s, ways to manage, when to call MD/seek help       Determine if patient experiencing side effects, ways to manage  Pt denies adverse effects or issues with medication administration.      Miscellaneous      Food interactions, DDIs, financial issues Determine if patient started any new medications (analyze for DDI)       Additional Notes: Discussed aforementioned material with patient by phone. All questions and concerns addressed. Patient provided with my contact information and instructed to call if any additional questions arise. Notified [email or web address] of refill request.

## 2021-01-15 ENCOUNTER — TELEPHONE (OUTPATIENT)
Dept: CARDIOLOGY | Facility: CLINIC | Age: 26
End: 2021-01-15

## 2021-01-18 ENCOUNTER — OFFICE VISIT (OUTPATIENT)
Dept: FAMILY MEDICINE CLINIC | Facility: CLINIC | Age: 26
End: 2021-01-18

## 2021-01-18 VITALS
HEIGHT: 71 IN | SYSTOLIC BLOOD PRESSURE: 124 MMHG | OXYGEN SATURATION: 97 % | BODY MASS INDEX: 41.02 KG/M2 | DIASTOLIC BLOOD PRESSURE: 82 MMHG | HEART RATE: 71 BPM | WEIGHT: 293 LBS

## 2021-01-18 DIAGNOSIS — R19.5 DARK STOOLS: ICD-10-CM

## 2021-01-18 DIAGNOSIS — R10.9 ABDOMINAL PAIN, UNSPECIFIED ABDOMINAL LOCATION: Primary | ICD-10-CM

## 2021-01-18 DIAGNOSIS — R11.2 NON-INTRACTABLE VOMITING WITH NAUSEA, UNSPECIFIED VOMITING TYPE: ICD-10-CM

## 2021-01-18 DIAGNOSIS — R19.7 DIARRHEA, UNSPECIFIED TYPE: ICD-10-CM

## 2021-01-18 PROCEDURE — 99214 OFFICE O/P EST MOD 30 MIN: CPT | Performed by: PHYSICIAN ASSISTANT

## 2021-01-18 RX ORDER — ONDANSETRON 4 MG/1
4 TABLET, ORALLY DISINTEGRATING ORAL EVERY 6 HOURS PRN
Qty: 30 TABLET | Refills: 0 | Status: SHIPPED | OUTPATIENT
Start: 2021-01-18 | End: 2022-09-08

## 2021-01-18 RX ORDER — FAMOTIDINE 20 MG/1
40 TABLET, FILM COATED ORAL DAILY
Qty: 60 TABLET | Refills: 1 | Status: SHIPPED | OUTPATIENT
Start: 2021-01-18 | End: 2021-03-29

## 2021-01-18 NOTE — PATIENT INSTRUCTIONS
-Go to the [facility] lab located at [address] [place] [address] for stool collection supplies. They are open from 8 AM to 4:15 PM Monday through Friday. You do not need an appointment  -Use Imodium over the counter for diarrhea as needed until your testing returns

## 2021-01-18 NOTE — PROGRESS NOTES
"    Chief Complaint   Patient presents with   • Diarrhea     x 5 weeks    • Nausea   • Vomiting       HPI     Ammon Vargas is a pleasant 25 y.o. female who presents for evaluation of \"chief complaint.\" She c/o of a 1 month history of diarrhea and intermittent nausea and vomiting. Diarrhea is persistent. Nausea and vomiting x3/day predominantly occurs on weekends. Associated abdominal bloating and left sided abdominal pain. States stools are more dark and have abnormal odor. She takes minocyclin for hydradenitis suppurativa and was treated with antibiotics for a UTI by the ER 2 weeks ago. CT of the abdomen was normal at that time. Uses NSAIDs prn for migraines. No regular alcohol use. She is s/p cholecystectomy. She has a family history of colon cancer.     Past Medical History:   Diagnosis Date   • Asthma    • Autonomic failure    • CRPS (complex regional pain syndrome type I)    • Fibromyalgia    • Fibromyalgia    • Migraine    • Migraines    • MRSA carrier    • POTS (postural orthostatic tachycardia syndrome)    • POTS (postural orthostatic tachycardia syndrome)    • Pure autonomic failure        Past Surgical History:   Procedure Laterality Date   • ADENOIDECTOMY     • CHOLECYSTECTOMY     • EAR TUBES     • TONSILLECTOMY AND ADENOIDECTOMY         Family History   Problem Relation Age of Onset   • Diabetes Mother    • Cancer Maternal Grandmother    • Diabetes Maternal Grandmother    • Kidney disease Maternal Grandmother    • Stroke Maternal Grandmother    • Cancer Maternal Grandfather    • Heart attack Maternal Grandfather    • Hearing loss Maternal Grandfather    • Stroke Maternal Grandfather    • Cancer Paternal Grandfather    • Hearing loss Paternal Grandfather    • Heart attack Paternal Grandfather    • No Known Problems Father        Social History     Socioeconomic History   • Marital status:      Spouse name: Adrian Vargas   • Number of children: Not on file   • Years of education: Not on file   • " Highest education level: Not on file   Tobacco Use   • Smoking status: Never Smoker   • Smokeless tobacco: Never Used   Substance and Sexual Activity   • Alcohol use: No     Frequency: Never     Comment: SOCIAL   • Drug use: No   • Sexual activity: Defer   Social History Narrative    Livews in [place] with spouse       Allergies   Allergen Reactions   • Banana Anaphylaxis   • Cantaloupe (Diagnostic) Hives   • Eggs Or Egg-Derived Products Hives, Swelling and Angioedema   • Mushroom Hives   • Shellfish-Derived Products Hives   • Maxalt [Rizatriptan Benzoate] Other (See Comments)     reaction   • Rizatriptan Rash       ROS    Review of Systems   Constitutional: Negative for chills and fever.   Gastrointestinal: Positive for abdominal distention, abdominal pain, diarrhea and vomiting. Negative for blood in stool.       Vitals:    01/18/21 1543   BP: 124/82   Pulse: 71   SpO2: 97%     Body mass index is 44.51 kg/m².      Current Outpatient Medications:   •  acetaminophen (TYLENOL) 500 MG tablet, Take 500 mg by mouth Every 6 (Six) Hours As Needed for Mild Pain  or Headache., Disp: , Rfl:   •  albuterol sulfate HFA (PROAIR HFA) 108 (90 Base) MCG/ACT inhaler, Inhale 1 puff Every 4 (Four) Hours As Needed for Wheezing., Disp: 18 g, Rfl: 0  •  bisoprolol (ZEBeta) 5 MG tablet, Take 1 tablet by mouth 2 (Two) Times a Day., Disp: 60 tablet, Rfl: 11  •  cetirizine (zyrTEC) 10 MG tablet, Take 1 tablet by mouth Daily., Disp: 30 tablet, Rfl: 11  •  citalopram (CeleXA) 20 MG tablet, Take 1 tablet by mouth every night at bedtime., Disp: 90 tablet, Rfl: 0  •  EPINEPHrine (EPIPEN 2-BJ) 0.3 MG/0.3ML solution auto-injector injection, Inject 0.3 mL into the appropriate muscle as directed by prescriber 1 (One) Time As Needed (anaphylaxis) for up to 1 dose., Disp: 2 each, Rfl: 0  •  Erenumab-aooe (AIMOVIG) 140 MG/ML prefilled syringe, Inject 1 mL under the skin into the appropriate area as directed Every 28 (Twenty-Eight) Days., Disp: 1 mL,  Rfl: 11  •  fludrocortisone 0.1 MG tablet, Take 2 tablets by mouth Daily With Breakfast., Disp: 60 tablet, Rfl: 11  •  fluticasone (FLONASE) 50 MCG/ACT nasal spray, Use 2 sprays in each nostril as directed by provider Daily., Disp: 16 g, Rfl: 3  •  ibuprofen (ADVIL,MOTRIN) 600 MG tablet, Take 1 tablet by mouth Every 6 (Six) Hours As Needed for Mild Pain ., Disp: 30 tablet, Rfl: 0  •  IRON PO, Take 1 tablet by mouth., Disp: , Rfl:   •  minocycline (MINOCIN,DYNACIN) 100 MG capsule, Take 1 capsule by mouth Daily., Disp: 30 capsule, Rfl: 2  •  NEXPLANON 68 MG implant subdermal implant, , Disp: , Rfl:   •  NORTHERA 200 MG capsule, TAKE 1 CAPSULE BY MOUTH THREE TIMES DAILY. TAKE LAST DOSE AT LEAST 3 HOURS BEFORE BEDTIME. (TAKE ALONG WITH 300MG TO EQUAL 500 MG 3 TIMES DAILY)., Disp: 90 capsule, Rfl: 11  •  NORTHERA 300 MG capsule capsule, TAKE 1 CAPSULE BY MOUTH THREE TIMES DAILY. TAKE LAST DOSE AT LEAST 3 HOURS BEFORE BEDTIME (TAKE ALONG WITH 200MG TO EQUAL 500 MG 3 TIMES DAILY), Disp: 90 capsule, Rfl: 2  •  ondansetron ODT (ZOFRAN-ODT) 4 MG disintegrating tablet, Place 1 tablet on the tongue Every 6 (Six) Hours As Needed for Nausea or Vomiting., Disp: 30 tablet, Rfl: 0  •  famotidine (PEPCID) 20 MG tablet, Take 2 tablets by mouth Daily., Disp: 60 tablet, Rfl: 1    PE    Physical Exam  Vitals signs reviewed.   Constitutional:       General: She is not in acute distress.     Appearance: She is well-developed.   HENT:      Head: Normocephalic and atraumatic.   Eyes:      Conjunctiva/sclera: Conjunctivae normal.   Neck:      Musculoskeletal: Normal range of motion.   Cardiovascular:      Rate and Rhythm: Normal rate and regular rhythm.      Heart sounds: Normal heart sounds. No murmur.   Pulmonary:      Effort: Pulmonary effort is normal.      Breath sounds: Normal breath sounds.   Abdominal:      Palpations: Abdomen is soft.      Tenderness: There is abdominal tenderness in the epigastric area, left upper quadrant and left  lower quadrant. There is no guarding or rebound.   Skin:     General: Skin is warm and dry.   Neurological:      Mental Status: She is alert.      Gait: Gait normal.   Psychiatric:         Speech: Speech normal.         Behavior: Behavior normal.          A/P    Problem List Items Addressed This Visit     None      Visit Diagnoses     Abdominal pain, unspecified abdominal location    -  Primary  -No peritoneal signs, normal lipase and CT abdomen on 1/1/21  -Discussed ddx: gastritis, h.pylori, diverticulitis, colitis, among others  -Order breath test, stool studies, CBC, CMP. Start pepcid after breath test  -Discussed hydration, pedialyte, imodium and zofran prn  -If testing is normal and symptoms persist, refer for EGD/colonoscopy     Relevant Orders    H. Pylori Breath Test - Breath, Lung    Comprehensive Metabolic Panel    CBC & Differential    Non-intractable vomiting with nausea, unspecified vomiting type        Diarrhea, unspecified type        Relevant Orders    Stool Culture (Reference Lab) - Stool, Per Rectum    Clostridium Difficile Toxin, PCR - Stool, Per Rectum    Occult Blood, Fecal By Immunoassay - Stool, Per Rectum    Ova & Parasite Examination - Stool, Per Rectum    Dark stools              Plan of care was reviewed with patient at the conclusion of today's visit. Education was provided regarding diagnoses, management, prescribed or recommended OTC products, and the importance of compliance with follow-up appointments. The patient was counseled regarding the risks, benefits, and possible side-effects of treatment. I advised the patient to keep me informed of any acute changes in their status including new, worsening, or persistent symptoms. Patient expresses understanding and agreement with the management plan.        VERONICA Rosas

## 2021-01-19 ENCOUNTER — LAB (OUTPATIENT)
Dept: LAB | Facility: HOSPITAL | Age: 26
End: 2021-01-19

## 2021-01-19 DIAGNOSIS — R19.7 DIARRHEA, UNSPECIFIED TYPE: ICD-10-CM

## 2021-01-19 DIAGNOSIS — Z00.00 ROUTINE MEDICAL EXAM: ICD-10-CM

## 2021-01-19 DIAGNOSIS — R10.9 ABDOMINAL PAIN, UNSPECIFIED ABDOMINAL LOCATION: ICD-10-CM

## 2021-01-19 LAB
25(OH)D3 SERPL-MCNC: 19.4 NG/ML (ref 30–100)
ALBUMIN SERPL-MCNC: 4 G/DL (ref 3.5–5.2)
ALBUMIN/GLOB SERPL: 1.4 G/DL
ALP SERPL-CCNC: 120 U/L (ref 39–117)
ALT SERPL W P-5'-P-CCNC: 30 U/L (ref 1–33)
ANION GAP SERPL CALCULATED.3IONS-SCNC: 10.1 MMOL/L (ref 5–15)
ANISOCYTOSIS BLD QL: ABNORMAL
AST SERPL-CCNC: 20 U/L (ref 1–32)
BILIRUB SERPL-MCNC: 0.3 MG/DL (ref 0–1.2)
BUN SERPL-MCNC: 6 MG/DL (ref 6–20)
BUN/CREAT SERPL: 10.9 (ref 7–25)
CALCIUM SPEC-SCNC: 9.3 MG/DL (ref 8.6–10.5)
CHLORIDE SERPL-SCNC: 103 MMOL/L (ref 98–107)
CHOLEST SERPL-MCNC: 127 MG/DL (ref 0–200)
CO2 SERPL-SCNC: 24.9 MMOL/L (ref 22–29)
CREAT SERPL-MCNC: 0.55 MG/DL (ref 0.57–1)
CRP SERPL-MCNC: 1.02 MG/DL (ref 0–0.5)
DEPRECATED RDW RBC AUTO: 39.2 FL (ref 37–54)
EOSINOPHIL # BLD MANUAL: 0.41 10*3/MM3 (ref 0–0.4)
EOSINOPHIL NFR BLD MANUAL: 5.2 % (ref 0.3–6.2)
ERYTHROCYTE [DISTWIDTH] IN BLOOD BY AUTOMATED COUNT: 15 % (ref 12.3–15.4)
ERYTHROCYTE [SEDIMENTATION RATE] IN BLOOD: 23 MM/HR (ref 0–20)
GFR SERPL CREATININE-BSD FRML MDRD: 135 ML/MIN/1.73
GLOBULIN UR ELPH-MCNC: 2.8 GM/DL
GLUCOSE SERPL-MCNC: 107 MG/DL (ref 65–99)
HBA1C MFR BLD: 5.63 % (ref 4.8–5.6)
HCT VFR BLD AUTO: 38.2 % (ref 34–46.6)
HDLC SERPL-MCNC: 28 MG/DL (ref 40–60)
HGB BLD-MCNC: 12.1 G/DL (ref 12–15.9)
HYPOCHROMIA BLD QL: ABNORMAL
LDLC SERPL CALC-MCNC: 79 MG/DL (ref 0–100)
LDLC/HDLC SERPL: 2.79 {RATIO}
LYMPHOCYTES # BLD MANUAL: 1.14 10*3/MM3 (ref 0.7–3.1)
LYMPHOCYTES NFR BLD MANUAL: 14.6 % (ref 19.6–45.3)
LYMPHOCYTES NFR BLD MANUAL: 2.1 % (ref 5–12)
MCH RBC QN AUTO: 23.4 PG (ref 26.6–33)
MCHC RBC AUTO-ENTMCNC: 31.7 G/DL (ref 31.5–35.7)
MCV RBC AUTO: 73.9 FL (ref 79–97)
MICROCYTES BLD QL: ABNORMAL
MONOCYTES # BLD AUTO: 0.16 10*3/MM3 (ref 0.1–0.9)
NEUTROPHILS # BLD AUTO: 6.1 10*3/MM3 (ref 1.7–7)
NEUTROPHILS NFR BLD MANUAL: 78.1 % (ref 42.7–76)
OVALOCYTES BLD QL SMEAR: ABNORMAL
PLAT MORPH BLD: NORMAL
PLATELET # BLD AUTO: 443 10*3/MM3 (ref 140–450)
PMV BLD AUTO: 9.5 FL (ref 6–12)
POIKILOCYTOSIS BLD QL SMEAR: ABNORMAL
POTASSIUM SERPL-SCNC: 4 MMOL/L (ref 3.5–5.2)
PROT SERPL-MCNC: 6.8 G/DL (ref 6–8.5)
RBC # BLD AUTO: 5.17 10*6/MM3 (ref 3.77–5.28)
SODIUM SERPL-SCNC: 138 MMOL/L (ref 136–145)
TRIGL SERPL-MCNC: 105 MG/DL (ref 0–150)
TSH SERPL DL<=0.05 MIU/L-ACNC: 1.13 UIU/ML (ref 0.27–4.2)
VLDLC SERPL-MCNC: 20 MG/DL (ref 5–40)
WBC # BLD AUTO: 7.81 10*3/MM3 (ref 3.4–10.8)
WBC MORPH BLD: NORMAL

## 2021-01-19 PROCEDURE — 83013 H PYLORI (C-13) BREATH: CPT

## 2021-01-19 PROCEDURE — 85007 BL SMEAR W/DIFF WBC COUNT: CPT

## 2021-01-19 PROCEDURE — 80061 LIPID PANEL: CPT

## 2021-01-19 PROCEDURE — 84443 ASSAY THYROID STIM HORMONE: CPT

## 2021-01-19 PROCEDURE — 80053 COMPREHEN METABOLIC PANEL: CPT

## 2021-01-19 PROCEDURE — 86140 C-REACTIVE PROTEIN: CPT | Performed by: PHYSICIAN ASSISTANT

## 2021-01-19 PROCEDURE — 82306 VITAMIN D 25 HYDROXY: CPT

## 2021-01-19 PROCEDURE — 83036 HEMOGLOBIN GLYCOSYLATED A1C: CPT

## 2021-01-19 PROCEDURE — 85652 RBC SED RATE AUTOMATED: CPT | Performed by: PHYSICIAN ASSISTANT

## 2021-01-19 PROCEDURE — 85025 COMPLETE CBC W/AUTO DIFF WBC: CPT

## 2021-01-20 ENCOUNTER — LAB (OUTPATIENT)
Dept: LAB | Facility: HOSPITAL | Age: 26
End: 2021-01-20

## 2021-01-20 LAB
C DIFF TOX GENS STL QL NAA+PROBE: NOT DETECTED
HEMOCCULT STL QL: NEGATIVE
UREA BREATH TEST QL: NEGATIVE

## 2021-01-20 PROCEDURE — 87046 STOOL CULTR AEROBIC BACT EA: CPT

## 2021-01-20 PROCEDURE — 87209 SMEAR COMPLEX STAIN: CPT

## 2021-01-20 PROCEDURE — 87427 SHIGA-LIKE TOXIN AG IA: CPT

## 2021-01-20 PROCEDURE — 87177 OVA AND PARASITES SMEARS: CPT

## 2021-01-20 PROCEDURE — 82270 OCCULT BLOOD FECES: CPT

## 2021-01-20 PROCEDURE — 87045 FECES CULTURE AEROBIC BACT: CPT

## 2021-01-20 PROCEDURE — 87493 C DIFF AMPLIFIED PROBE: CPT

## 2021-01-22 ENCOUNTER — TELEPHONE (OUTPATIENT)
Dept: NEUROLOGY | Facility: CLINIC | Age: 26
End: 2021-01-22

## 2021-01-22 PROBLEM — E55.9 VITAMIN D DEFICIENCY: Status: ACTIVE | Noted: 2021-01-22

## 2021-01-22 RX ORDER — ERGOCALCIFEROL 1.25 MG/1
50000 CAPSULE ORAL WEEKLY
Qty: 12 CAPSULE | Refills: 0 | Status: SHIPPED | OUTPATIENT
Start: 2021-01-22 | End: 2022-08-10

## 2021-01-22 NOTE — TELEPHONE ENCOUNTER
Provider: DR. VALENTINE  Caller: CIARAN CHARLTON  Relationship to Patient: SELF    Reason for Call: PT CALLING HAS A QUESTION ABOUT RE-ENROLLING AIMOVIG PRESCRIPTION CARD CAUSE SHE GOT A EMAIL FROM THE [employer]. SHE WANTS TO KNOW WHAT SHE NEEDS TO DO?          PLEASE CALL HER BACK [phone]

## 2021-01-24 LAB
BACTERIA SPEC CULT: NORMAL
BACTERIA SPEC CULT: NORMAL
CAMPYLOBACTER STL CULT: NORMAL
E COLI SXT STL QL IA: NEGATIVE
SALM + SHIG STL CULT: NORMAL

## 2021-01-25 LAB
O+P SPEC MICRO: NORMAL
O+P STL TRI STN: NORMAL

## 2021-01-25 NOTE — TELEPHONE ENCOUNTER
Notified Ammon Davis that she does not need to do anything. She should have one more active script on her original card, but our pharmacy will notify him when it is time to re enroll and he will take care of that for her. She stated great appreciation. Thanks!

## 2021-01-26 DIAGNOSIS — R10.9 ABDOMINAL PAIN, UNSPECIFIED ABDOMINAL LOCATION: Primary | ICD-10-CM

## 2021-01-26 DIAGNOSIS — R70.0 ELEVATED ERYTHROCYTE SEDIMENTATION RATE: ICD-10-CM

## 2021-01-26 DIAGNOSIS — Z80.0 FAMILY HISTORY OF COLON CANCER: ICD-10-CM

## 2021-01-26 DIAGNOSIS — R19.7 DIARRHEA, UNSPECIFIED TYPE: ICD-10-CM

## 2021-02-01 DIAGNOSIS — Z12.11 SCREENING FOR COLON CANCER: Primary | ICD-10-CM

## 2021-02-01 RX ORDER — SODIUM, POTASSIUM,MAG SULFATES 17.5-3.13G
1 SOLUTION, RECONSTITUTED, ORAL ORAL TAKE AS DIRECTED
Qty: 177 ML | Refills: 0 | Status: SHIPPED | OUTPATIENT
Start: 2021-02-01 | End: 2021-05-06

## 2021-02-12 ENCOUNTER — TELEPHONE (OUTPATIENT)
Dept: GASTROENTEROLOGY | Facility: CLINIC | Age: 26
End: 2021-02-12

## 2021-02-12 NOTE — TELEPHONE ENCOUNTER
I spoke with Ms Sam. Informed her that bowel prep was sent to [facility] [address]. I [facility]. Patient agreed.[facility] pharmacy has been taking out of her chart with [facility] added.

## 2021-02-12 NOTE — TELEPHONE ENCOUNTER
----- Message from Jenny Upton sent at 2/12/2021 10:16 AM EST -----  Regarding: COLON PREP  Patient is requesting to have prep resent to [facility].

## 2021-02-16 ENCOUNTER — SPECIALTY PHARMACY (OUTPATIENT)
Dept: ONCOLOGY | Facility: HOSPITAL | Age: 26
End: 2021-02-16

## 2021-02-16 ENCOUNTER — APPOINTMENT (OUTPATIENT)
Dept: PREADMISSION TESTING | Facility: HOSPITAL | Age: 26
End: 2021-02-16

## 2021-02-16 LAB — SARS-COV-2 RNA RESP QL NAA+PROBE: NOT DETECTED

## 2021-02-16 PROCEDURE — U0004 COV-19 TEST NON-CDC HGH THRU: HCPCS

## 2021-02-16 PROCEDURE — C9803 HOPD COVID-19 SPEC COLLECT: HCPCS

## 2021-02-16 NOTE — PROGRESS NOTES
Injectable Neurology Medication Follow-Up        Patient Name/:     Ammon Vargas    1995  Injectable Neurology Medication Regimen:  Aimovig 140mg SQ Q28 days  Date Started Medication: 3/2/2020               Initial Teaching Follow Up Comments      Safety      Storage instructions (away from children; away from heat/cold, sunlight, or moisture)       “How are you storing your medications?”, reminders on storage, proper handling (away from children, managing waste, etc.), disposal of medication with D/C or dosage change     Patient reports appropriate storage and handling.       Adherence       patient and/or caregiver on how to take medication, take with/without food, assess their adherence potential, stress importance of adherence, ways to manage adherence (pill boxes, phone reminders, calendars), what to do if miss a dose    “How are you taking your medication?” “How are you remembering to take your medication?”, “How many doses have you missed?”, determine reasons for non-adherence (not remembering, side effects, etc), ways to improve, overadherence? Remind patient of ways to improve/maintain adherence Confirmed dose of Aimovig 140mg SQ Q28 days. Discussed importance of compliance. Refill processed at [facility] and will be picked up by patient.     Side Effects/Adverse Reactions       patient on potential side effects, s/s, ways to manage, when to call MD/seek help       Determine if patient experiencing side effects, ways to manage  Pt denies adverse effects or issues with medication administration.      Miscellaneous      Food interactions, DDIs, financial issues Determine if patient started any new medications (analyze for DDI)       Additional Notes: Discussed aforementioned material with patient by phone. All questions and concerns addressed. Patient provided with my contact information and instructed to call if any additional questions arise. Notified [facility] of refill request.

## 2021-02-17 NOTE — TELEPHONE ENCOUNTER
I called Ms Vargas back. Patient stated she had already spoke with Mar[facility] about her prep. Only one prep to take for Colonoscopy not two.

## 2021-02-19 ENCOUNTER — OUTSIDE FACILITY SERVICE (OUTPATIENT)
Dept: GASTROENTEROLOGY | Facility: CLINIC | Age: 26
End: 2021-02-19

## 2021-02-19 PROCEDURE — 43239 EGD BIOPSY SINGLE/MULTIPLE: CPT | Performed by: INTERNAL MEDICINE

## 2021-02-19 PROCEDURE — 88305 TISSUE EXAM BY PATHOLOGIST: CPT | Performed by: INTERNAL MEDICINE

## 2021-02-19 PROCEDURE — 45380 COLONOSCOPY AND BIOPSY: CPT | Performed by: INTERNAL MEDICINE

## 2021-02-22 ENCOUNTER — LAB REQUISITION (OUTPATIENT)
Dept: LAB | Facility: HOSPITAL | Age: 26
End: 2021-02-22

## 2021-02-22 DIAGNOSIS — R19.7 DIARRHEA, UNSPECIFIED: ICD-10-CM

## 2021-02-22 DIAGNOSIS — R10.9 UNSPECIFIED ABDOMINAL PAIN: ICD-10-CM

## 2021-02-22 DIAGNOSIS — R11.0 NAUSEA: ICD-10-CM

## 2021-02-22 DIAGNOSIS — R70.0 ELEVATED ERYTHROCYTE SEDIMENTATION RATE: ICD-10-CM

## 2021-02-22 DIAGNOSIS — Z80.0 FAMILY HISTORY OF MALIGNANT NEOPLASM OF DIGESTIVE ORGANS: ICD-10-CM

## 2021-02-22 PROBLEM — K64.8 INTERNAL HEMORRHOIDS: Status: ACTIVE | Noted: 2021-02-22

## 2021-02-22 PROBLEM — K20.90 ESOPHAGITIS: Status: ACTIVE | Noted: 2021-02-22

## 2021-02-23 LAB
CYTO UR: NORMAL
LAB AP CASE REPORT: NORMAL
LAB AP CLINICAL INFORMATION: NORMAL
LAB AP DIAGNOSIS COMMENT: NORMAL
PATH REPORT.FINAL DX SPEC: NORMAL
PATH REPORT.GROSS SPEC: NORMAL

## 2021-02-25 ENCOUNTER — TELEPHONE (OUTPATIENT)
Dept: GASTROENTEROLOGY | Facility: CLINIC | Age: 26
End: 2021-02-25

## 2021-02-25 DIAGNOSIS — K58.0 IRRITABLE BOWEL SYNDROME WITH DIARRHEA: Primary | ICD-10-CM

## 2021-02-25 NOTE — TELEPHONE ENCOUNTER
I TRIED TO CALL MS CHARLTON. NO ANSWER; LEFT VOICE MESSAGE. I WILL SEND RESULTS THROUGH HER MYCHART AND THE MAIL.

## 2021-02-25 NOTE — TELEPHONE ENCOUNTER
----- Message from Alexis Jimenez MD sent at 2/24/2021  5:34 PM EST -----  Let Ms. Vargas know the esophageal biopsies demonstrate eosinophilic esophagitis.  She should be on a proton pump medication daily.  The colon biopsies were negative for colitis.  The diarrhea issues likely related to irritable bowel syndrome.  Please send her a FODMAP diet.  Can also consider a 14-day course of Xifaxan.  Thank you,  DEANA

## 2021-02-25 NOTE — TELEPHONE ENCOUNTER
DR LÓPEZ,    MS ZOLTAN CALLED BACK. BIOPSY RESULTS GIVEN. I WILL MAIL RESULTS, FODMAP DIET, AND LITERATURE ON EOSINOPHILIC ESOPHAGITIS. PATIENT VOICED UNDERSTANDING.  PATIENT IS AGREEABLE TO START A TRAIL OF XIFAXAN. THANKS

## 2021-03-16 ENCOUNTER — SPECIALTY PHARMACY (OUTPATIENT)
Dept: ONCOLOGY | Facility: HOSPITAL | Age: 26
End: 2021-03-16

## 2021-03-16 NOTE — PROGRESS NOTES
Injectable Neurology Medication Follow-Up        Patient Name/:     Ammon Vargas    1995  Injectable Neurology Medication Regimen:  Aimovig 140mg SQ Q28 days  Date Started Medication: 3/2/2020               Initial Teaching Follow Up Comments      Safety      Storage instructions (away from children; away from heat/cold, sunlight, or moisture)       “How are you storing your medications?”, reminders on storage, proper handling (away from children, managing waste, etc.), disposal of medication with D/C or dosage change     Patient reports appropriate storage and handling.       Adherence       patient and/or caregiver on how to take medication, take with/without food, assess their adherence potential, stress importance of adherence, ways to manage adherence (pill boxes, phone reminders, calendars), what to do if miss a dose    “How are you taking your medication?” “How are you remembering to take your medication?”, “How many doses have you missed?”, determine reasons for non-adherence (not remembering, side effects, etc), ways to improve, overadherence? Remind patient of ways to improve/maintain adherence Confirmed dose of Aimovig 140mg SQ Q28 days. Discussed importance of compliance. Refill processed at [facility] and will be picked up by patient.     Side Effects/Adverse Reactions       patient on potential side effects, s/s, ways to manage, when to call MD/seek help       Determine if patient experiencing side effects, ways to manage  Pt denies adverse effects or issues with medication administration.      Miscellaneous      Food interactions, DDIs, financial issues Determine if patient started any new medications (analyze for DDI)       Additional Notes: Discussed aforementioned material with patient by phone. All questions and concerns addressed. Patient provided with my contact information and instructed to call if any additional questions arise. Notified [facility] of refill request.

## 2021-03-28 ENCOUNTER — PATIENT MESSAGE (OUTPATIENT)
Dept: GASTROENTEROLOGY | Facility: CLINIC | Age: 26
End: 2021-03-28

## 2021-03-29 DIAGNOSIS — K20.0 ESOPHAGITIS, EOSINOPHILIC: ICD-10-CM

## 2021-03-29 DIAGNOSIS — K21.00 GASTROESOPHAGEAL REFLUX DISEASE WITH ESOPHAGITIS WITHOUT HEMORRHAGE: Primary | ICD-10-CM

## 2021-03-29 RX ORDER — ESOMEPRAZOLE MAGNESIUM 40 MG/1
40 CAPSULE, DELAYED RELEASE ORAL DAILY
Qty: 30 CAPSULE | Refills: 5 | Status: SHIPPED | OUTPATIENT
Start: 2021-03-29 | End: 2022-03-28

## 2021-03-29 NOTE — TELEPHONE ENCOUNTER
From: Noel Vargas  To: lAexis Jimenez MD  Sent: 3/28/2021 2:03 AM EDT  Subject: Non-Urgent Medical Question    Dr. Jimenez,   I have not been sick since I cleaned out my system for my procedure on February 19. But tonight I am starting the same symptoms that lead up to the vomiting. I have changed my diet, barely any gluten/dairy. I just need to know if I should be taking a PPI or a topical steroid. 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 Anything you can do will be greatly appreciated!   Thank you,  Noel Vargas

## 2021-03-30 ENCOUNTER — SPECIALTY PHARMACY (OUTPATIENT)
Dept: ONCOLOGY | Facility: HOSPITAL | Age: 26
End: 2021-03-30

## 2021-03-30 RX ORDER — ERENUMAB-AOOE 140 MG/ML
140 INJECTION, SOLUTION SUBCUTANEOUS
Qty: 1 ML | Refills: 11 | OUTPATIENT
Start: 2021-03-30

## 2021-03-30 RX ORDER — CITALOPRAM 20 MG/1
20 TABLET ORAL
Qty: 90 TABLET | Refills: 1 | Status: SHIPPED | OUTPATIENT
Start: 2021-03-30 | End: 2022-02-08

## 2021-03-30 NOTE — TELEPHONE ENCOUNTER
Contacted patient to let her know her prescription was sent to the pharmacy on file & her PCP would like for her to schedule a physical. Pt verbalized understanding and is scheduled for a visit on 4/16/21. Pt did  not have any additional questions at this time.

## 2021-04-24 ENCOUNTER — OFFICE VISIT (OUTPATIENT)
Dept: FAMILY MEDICINE CLINIC | Facility: CLINIC | Age: 26
End: 2021-04-24

## 2021-04-24 VITALS
DIASTOLIC BLOOD PRESSURE: 76 MMHG | HEART RATE: 95 BPM | HEIGHT: 71 IN | OXYGEN SATURATION: 98 % | SYSTOLIC BLOOD PRESSURE: 128 MMHG | WEIGHT: 293 LBS | BODY MASS INDEX: 41.02 KG/M2

## 2021-04-24 DIAGNOSIS — E55.9 VITAMIN D DEFICIENCY: ICD-10-CM

## 2021-04-24 DIAGNOSIS — E66.01 CLASS 3 SEVERE OBESITY DUE TO EXCESS CALORIES WITH BODY MASS INDEX (BMI) OF 40.0 TO 44.9 IN ADULT, UNSPECIFIED WHETHER SERIOUS COMORBIDITY PRESENT (HCC): ICD-10-CM

## 2021-04-24 DIAGNOSIS — K20.90 ESOPHAGITIS: ICD-10-CM

## 2021-04-24 DIAGNOSIS — J45.21 MILD INTERMITTENT ASTHMA WITH ACUTE EXACERBATION: ICD-10-CM

## 2021-04-24 DIAGNOSIS — M79.7 FIBROMYALGIA: ICD-10-CM

## 2021-04-24 DIAGNOSIS — R73.02 IMPAIRED GLUCOSE TOLERANCE: ICD-10-CM

## 2021-04-24 DIAGNOSIS — G43.109 MIGRAINE WITH AURA AND WITHOUT STATUS MIGRAINOSUS, NOT INTRACTABLE: ICD-10-CM

## 2021-04-24 DIAGNOSIS — G90.A POTS (POSTURAL ORTHOSTATIC TACHYCARDIA SYNDROME): ICD-10-CM

## 2021-04-24 DIAGNOSIS — F33.41 RECURRENT MAJOR DEPRESSIVE DISORDER, IN PARTIAL REMISSION (HCC): ICD-10-CM

## 2021-04-24 DIAGNOSIS — Z00.00 ROUTINE MEDICAL EXAM: Primary | ICD-10-CM

## 2021-04-24 PROCEDURE — 83036 HEMOGLOBIN GLYCOSYLATED A1C: CPT | Performed by: PHYSICIAN ASSISTANT

## 2021-04-24 PROCEDURE — 99395 PREV VISIT EST AGE 18-39: CPT | Performed by: PHYSICIAN ASSISTANT

## 2021-05-11 LAB — HBA1C MFR BLD: 5.6 %

## 2021-05-14 RX ORDER — CETIRIZINE HYDROCHLORIDE 10 MG/1
10 TABLET ORAL DAILY
Qty: 30 TABLET | Refills: 11 | Status: SHIPPED | OUTPATIENT
Start: 2021-05-14 | End: 2022-05-31

## 2021-05-14 RX ORDER — EPINEPHRINE 0.3 MG/.3ML
0.3 INJECTION SUBCUTANEOUS ONCE AS NEEDED
Qty: 2 EACH | Refills: 0 | Status: SHIPPED | OUTPATIENT
Start: 2021-05-14 | End: 2023-03-22 | Stop reason: HOSPADM

## 2021-05-19 ENCOUNTER — TELEPHONE (OUTPATIENT)
Dept: CARDIOLOGY | Facility: CLINIC | Age: 26
End: 2021-05-19

## 2021-05-21 ENCOUNTER — SPECIALTY PHARMACY (OUTPATIENT)
Dept: ONCOLOGY | Facility: HOSPITAL | Age: 26
End: 2021-05-21

## 2021-05-21 NOTE — PROGRESS NOTES
Injectable Neurology Medication Follow-Up        Patient Name/:     Ammon Vargas    1995  Injectable Neurology Medication Regimen:  Aimovig 140mg SQ Q28 days  Date Started Medication: 3/2/2020               Initial Teaching Follow Up Comments      Safety      Storage instructions (away from children; away from heat/cold, sunlight, or moisture)       “How are you storing your medications?”, reminders on storage, proper handling (away from children, managing waste, etc.), disposal of medication with D/C or dosage change     Patient reports appropriate storage and handling.       Adherence       patient and/or caregiver on how to take medication, take with/without food, assess their adherence potential, stress importance of adherence, ways to manage adherence (pill boxes, phone reminders, calendars), what to do if miss a dose    “How are you taking your medication?” “How are you remembering to take your medication?”, “How many doses have you missed?”, determine reasons for non-adherence (not remembering, side effects, etc), ways to improve, overadherence? Remind patient of ways to improve/maintain adherence Confirmed dose of Aimovig 140mg SQ Q28 days. Discussed importance of compliance. Refill processed at [facility] and will be picked up by patient.     Side Effects/Adverse Reactions       patient on potential side effects, s/s, ways to manage, when to call MD/seek help       Determine if patient experiencing side effects, ways to manage  Pt reports a bruise following last injection. Recommended to rotate injection site to thigh for next month.      Miscellaneous      Food interactions, DDIs, financial issues Determine if patient started any new medications (analyze for DDI)       Additional Notes: Discussed aforementioned material with patient by phone. All questions and concerns addressed. Patient provided with my contact information and instructed to call if any additional questions arise.  Notified [facility] of refill request.

## 2021-06-02 PROBLEM — Z97.5 NEXPLANON IN PLACE: Status: ACTIVE | Noted: 2021-06-02

## 2021-06-03 ENCOUNTER — OFFICE VISIT (OUTPATIENT)
Dept: OBSTETRICS AND GYNECOLOGY | Facility: CLINIC | Age: 26
End: 2021-06-03

## 2021-06-03 VITALS
DIASTOLIC BLOOD PRESSURE: 82 MMHG | WEIGHT: 293 LBS | BODY MASS INDEX: 41.02 KG/M2 | SYSTOLIC BLOOD PRESSURE: 120 MMHG | HEIGHT: 71 IN

## 2021-06-03 DIAGNOSIS — Z80.3 FAMILY HISTORY OF BREAST CANCER IN MOTHER: ICD-10-CM

## 2021-06-03 DIAGNOSIS — N76.0 BV (BACTERIAL VAGINOSIS): ICD-10-CM

## 2021-06-03 DIAGNOSIS — Z01.419 WOMEN'S ANNUAL ROUTINE GYNECOLOGICAL EXAMINATION: Primary | ICD-10-CM

## 2021-06-03 DIAGNOSIS — Z97.5 NEXPLANON IN PLACE: ICD-10-CM

## 2021-06-03 DIAGNOSIS — B96.89 BV (BACTERIAL VAGINOSIS): ICD-10-CM

## 2021-06-03 DIAGNOSIS — E66.01 CLASS 3 SEVERE OBESITY DUE TO EXCESS CALORIES WITH BODY MASS INDEX (BMI) OF 40.0 TO 44.9 IN ADULT, UNSPECIFIED WHETHER SERIOUS COMORBIDITY PRESENT (HCC): ICD-10-CM

## 2021-06-03 PROCEDURE — 99213 OFFICE O/P EST LOW 20 MIN: CPT | Performed by: OBSTETRICS & GYNECOLOGY

## 2021-06-03 PROCEDURE — 99395 PREV VISIT EST AGE 18-39: CPT | Performed by: OBSTETRICS & GYNECOLOGY

## 2021-06-03 RX ORDER — METRONIDAZOLE 500 MG/1
500 TABLET ORAL 2 TIMES DAILY
Qty: 14 TABLET | Refills: 0 | Status: SHIPPED | OUTPATIENT
Start: 2021-06-03 | End: 2021-06-18

## 2021-06-03 NOTE — PROGRESS NOTES
GYN Annual Exam     CC - Here for annual exam.        HPI  Ammon Vargas is a 26 y.o. female, , who presents for annual well woman exam. No LMP recorded (within months). Patient has had an implant..  Periods are irregular occurring every 8-12 weeks, lasting 3-4 weeks where she has heavy bleeding for most of that time. She sometimes will saturate a tampon every hour but only about 2-3 days of that time.  Dysmenorrhea: moderate to severe during her heavy days.  Patient reports problems with: none. She is happy with the Nexplanon despite the long periods.  There were no changes to her medical or surgical history since her last visit.. Partner Status: Marital Status: .  She is sexually active. She has not had new partners since her last STD testing. She does not desire STD testing. .    Additional OB/GYN History   Current contraception: contraceptive methods: Nexplanon inserted 2020  Desires to: continue contraception  Last Pap :   Last Completed Pap Smear          Ordered - PAP SMEAR (Every 3 Years) Ordered on 6/3/2021    2019  Done - normal    2018  SCANNED - PAP SMEAR    2018  Done              History of abnormal Pap smear: no  Family history of uterine, colon, breast, or ovarian cancer: yes - mother just had breast cancer, she had genetic testing and it was negative. Colon cancer in her maternal grandmother  Performs monthly Self-Breast Exam: yes  Exercises Regularly:yes  Feelings of Anxiety or Depression: yes - controlled with her medication  Tobacco Usage?: No   OB History        2    Para   1    Term   1       0    AB   1    Living   1       SAB   0    TAB   0    Ectopic   1    Molar   0    Multiple   0    Live Births   1          Obstetric Comments   FOB #1-G1,2             Health Maintenance   Topic Date Due   • Pneumococcal Vaccine 0-64 (1 of 1 - PPSV23) Never done   • HPV VACCINES (1 - Risk 3-dose series) Never done   • COVID-19 Vaccine (1) Never done   •  "Annual Gynecologic Pelvic and Breast Exam  03/14/2024 (Originally 1/23/2019)   • PAP SMEAR  03/21/2022   • ANNUAL PHYSICAL  04/25/2022   • TDAP/TD VACCINES (3 - Td or Tdap) 08/13/2029   • HEPATITIS C SCREENING  Completed       The additional following portions of the patient's history were reviewed and updated as appropriate: allergies, current medications, past family history, past medical history, past social history, past surgical history and problem list.    Review of Systems   Constitutional: Negative.    HENT: Negative.    Eyes: Negative.    Respiratory: Negative.    Cardiovascular: Negative.    Gastrointestinal: Negative.    Endocrine: Negative.    Genitourinary: Positive for menstrual problem and vaginal discharge.   Musculoskeletal: Negative.    Skin: Negative.    Allergic/Immunologic: Negative.    Neurological: Negative.    Hematological: Negative.    Psychiatric/Behavioral: Negative.         Anxiety- well controlled           I have reviewed and agree with the HPI, ROS, and historical information as entered above. Paola Call MD    Objective   /82   Ht 180.3 cm (71\")   Wt (!) 145 kg (320 lb)   LMP  (Within Months)   Breastfeeding No   BMI 44.63 kg/m²     Physical Exam  Vitals and nursing note reviewed. Exam conducted with a chaperone present.   Constitutional:       Appearance: She is well-developed.   HENT:      Head: Normocephalic and atraumatic.   Neck:      Thyroid: No thyroid mass or thyromegaly.   Cardiovascular:      Rate and Rhythm: Normal rate and regular rhythm.      Heart sounds: No murmur heard.     Pulmonary:      Effort: Pulmonary effort is normal. No retractions.      Breath sounds: Normal breath sounds. No wheezing, rhonchi or rales.   Chest:      Chest wall: No mass or tenderness.      Breasts:         Right: Normal. No mass, nipple discharge, skin change or tenderness.         Left: Normal. No mass, nipple discharge, skin change or tenderness.   Abdominal:      " General: Bowel sounds are normal.      Palpations: Abdomen is soft. Abdomen is not rigid. There is no mass.      Tenderness: There is no abdominal tenderness. There is no guarding.      Hernia: No hernia is present. There is no hernia in the left inguinal area or right inguinal area.   Genitourinary:     General: Normal vulva.      Exam position: Lithotomy position.      Pubic Area: No rash.       Labia:         Right: No rash, tenderness or lesion.         Left: No rash, tenderness or lesion.       Urethra: No urethral pain or urethral swelling.      Vagina: Vaginal discharge present. No lesions.      Cervix: No cervical motion tenderness, discharge, lesion or cervical bleeding.      Uterus: Normal. Not enlarged, not fixed and not tender.       Adnexa:         Right: No mass, tenderness or fullness.          Left: No mass, tenderness or fullness.        Rectum: No external hemorrhoid.      Comments: Fishy odor green discharge consistent with bacterial vaginosis.  Musculoskeletal:      Cervical back: Normal range of motion. No muscular tenderness.   Neurological:      Mental Status: She is alert and oriented to person, place, and time.   Psychiatric:         Behavior: Behavior normal.            Assessment and Plan    Problem List Items Addressed This Visit        Endocrine and Metabolic    Class 3 severe obesity due to excess calories with body mass index (BMI) of 40.0 to 44.9 in adult (CMS/[facility])    Relevant Orders    Ambulatory Referral to Weight Management Program       Family History    Family history of breast cancer in mother    Overview     Patient's mother was diagnosed at age 53.  BRCA testing was negative.  We discussed starting screening at age 40 or when patient feels anything abnormal.            Genitourinary and Reproductive     Nexplanon in place    Overview     Placed 1/8/2020           Other Visit Diagnoses     Women's annual routine gynecological examination    -  Primary    Relevant Orders    Pap  IG, Rfx HPV ASCU    BV (bacterial vaginosis)        Relevant Medications    metroNIDAZOLE (Flagyl) 500 MG tablet      Abnormal bleeding as a side effect of Nexplanon.  Patient desires to continue for now.  Long discussion regarding obesity and how to control.  We discussed my fitness pal jemal and increasing protein intake.  We discussed a more strenuous exercise program.  We will get a nutritional consult.  We also discussed surgical management to help.    1. GYN annual well woman exam.   2. Encouraged use of condoms for STD prevention.  3. Reviewed monthly self breast exams.  Instructed to call with lumps, pain, or breast discharge.    4. Reviewed BMI and weight loss as preventative health measures.   5. Reviewed exercise as a preventative health measures.   6. Recommended use of Vitamin D replacement and getting adequate calcium in her diet. (1500mg)  7. Reccommended Flu Vaccine in Fall of each year.  8. RTC in 1 year or PRN with problems      Paola Call MD  06/03/2021

## 2021-06-10 DIAGNOSIS — Z01.419 WOMEN'S ANNUAL ROUTINE GYNECOLOGICAL EXAMINATION: ICD-10-CM

## 2021-06-25 ENCOUNTER — TELEPHONE (OUTPATIENT)
Dept: CARDIOLOGY | Facility: CLINIC | Age: 26
End: 2021-06-25

## 2021-07-02 NOTE — TELEPHONE ENCOUNTER
Our options are extremely limited.  She had failed midodrine, Florinef alone, and serotonin antagonist in the past.  Best choice would be to continue Northera and try to get samples for her

## 2021-07-06 ENCOUNTER — SPECIALTY PHARMACY (OUTPATIENT)
Dept: ONCOLOGY | Facility: HOSPITAL | Age: 26
End: 2021-07-06

## 2021-07-06 NOTE — PROGRESS NOTES
Injectable Neurology Medication Follow-Up        Patient Name/:     Ammon Vargas    1995  Injectable Neurology Medication Regimen:  Aimovig 140mg SQ Q28 days  Date Started Medication: 3/2/2020               Initial Teaching Follow Up Comments      Safety      Storage instructions (away from children; away from heat/cold, sunlight, or moisture)       “How are you storing your medications?”, reminders on storage, proper handling (away from children, managing waste, etc.), disposal of medication with D/C or dosage change     Patient reports appropriate storage and handling.       Adherence       patient and/or caregiver on how to take medication, take with/without food, assess their adherence potential, stress importance of adherence, ways to manage adherence (pill boxes, phone reminders, calendars), what to do if miss a dose    “How are you taking your medication?” “How are you remembering to take your medication?”, “How many doses have you missed?”, determine reasons for non-adherence (not remembering, side effects, etc), ways to improve, overadherence? Remind patient of ways to improve/maintain adherence Confirmed dose of Aimovig 140mg SQ Q28 days. Discussed importance of compliance. Refill processed at [email or web address] retail and will be picked up by patient.     Side Effects/Adverse Reactions       patient on potential side effects, s/s, ways to manage, when to call MD/seek help       Determine if patient experiencing side effects, ways to manage  Pt reports a bruise following last injection. Recommended to rotate injection site to thigh for next month.      Miscellaneous      Food interactions, DDIs, financial issues Determine if patient started any new medications (analyze for DDI)       Additional Notes: Discussed aforementioned material with patient by phone. All questions and concerns addressed. Patient provided with my contact information and instructed to call if any additional questions arise.  Notified [facility] retail of refill request.

## 2021-07-09 NOTE — TELEPHONE ENCOUNTER
Patient called to report that her refills were previously sent to the wrong pharmacy. She gave me the correct phone number to the pharmacy that she has been using. She has a co-pay card and everything should be ok once they send the prescriptions. She said another PA should not be required, but they will contact us if so.

## 2021-07-12 RX ORDER — DROXIDOPA 200 MG/1
200 CAPSULE ORAL 3 TIMES DAILY
Qty: 270 CAPSULE | Refills: 3 | Status: SHIPPED | OUTPATIENT
Start: 2021-07-12 | End: 2021-07-23 | Stop reason: SDUPTHER

## 2021-07-12 RX ORDER — DROXIDOPA 300 MG/1
300 CAPSULE ORAL 3 TIMES DAILY
Qty: 270 CAPSULE | Refills: 3 | Status: SHIPPED | OUTPATIENT
Start: 2021-07-12 | End: 2021-07-23 | Stop reason: SDUPTHER

## 2021-07-23 RX ORDER — DROXIDOPA 200 MG/1
200 CAPSULE ORAL 3 TIMES DAILY
Qty: 270 CAPSULE | Refills: 3 | Status: SHIPPED | OUTPATIENT
Start: 2021-07-23 | End: 2021-08-02 | Stop reason: SDUPTHER

## 2021-07-23 RX ORDER — DROXIDOPA 300 MG/1
300 CAPSULE ORAL 3 TIMES DAILY
Qty: 270 CAPSULE | Refills: 3 | Status: SHIPPED | OUTPATIENT
Start: 2021-07-23 | End: 2021-08-02 | Stop reason: SDUPTHER

## 2021-08-02 RX ORDER — DROXIDOPA 200 MG/1
200 CAPSULE ORAL 3 TIMES DAILY
Qty: 90 CAPSULE | Refills: 6 | Status: SHIPPED | OUTPATIENT
Start: 2021-08-02 | End: 2022-02-04 | Stop reason: SDUPTHER

## 2021-08-02 RX ORDER — DROXIDOPA 300 MG/1
300 CAPSULE ORAL 3 TIMES DAILY
Qty: 90 CAPSULE | Refills: 6 | Status: SHIPPED | OUTPATIENT
Start: 2021-08-02 | End: 2022-02-04 | Stop reason: SDUPTHER

## 2021-09-27 ENCOUNTER — TELEPHONE (OUTPATIENT)
Dept: GASTROENTEROLOGY | Facility: CLINIC | Age: 26
End: 2021-09-27

## 2021-09-30 NOTE — TELEPHONE ENCOUNTER
Dr Jimenez,  Ms Sam called back. Nexium 40 mg was denied. Patient would like to switch to a different PPI. Thanks

## 2021-10-08 RX ORDER — FLUDROCORTISONE ACETATE 0.1 MG/1
0.2 TABLET ORAL
Qty: 60 TABLET | Refills: 6 | Status: SHIPPED | OUTPATIENT
Start: 2021-10-08 | End: 2022-06-29

## 2021-10-08 RX ORDER — BISOPROLOL FUMARATE 5 MG/1
5 TABLET, FILM COATED ORAL 2 TIMES DAILY
Qty: 60 TABLET | Refills: 6 | Status: SHIPPED | OUTPATIENT
Start: 2021-10-08 | End: 2022-06-29

## 2021-11-05 ENCOUNTER — SPECIALTY PHARMACY (OUTPATIENT)
Dept: ONCOLOGY | Facility: HOSPITAL | Age: 26
End: 2021-11-05

## 2021-12-08 ENCOUNTER — TELEPHONE (OUTPATIENT)
Dept: CARDIOLOGY | Facility: CLINIC | Age: 26
End: 2021-12-08

## 2021-12-08 NOTE — TELEPHONE ENCOUNTER
I called the patient to find out if she needs brand only for her Northera. She has to be seen for the PA to be complete. Paperwork on hold.

## 2021-12-13 ENCOUNTER — TELEPHONE (OUTPATIENT)
Dept: NEUROLOGY | Facility: CLINIC | Age: 26
End: 2021-12-13

## 2021-12-13 NOTE — TELEPHONE ENCOUNTER
Caller: CIARAN     Relationship: PT     Best call back number:[phone]    Requested Prescriptions:   Requested Prescriptions     Pending Prescriptions Disp Refills   • Erenumab-aooe (AIMOVIG) 140 MG/ML prefilled syringe 1 mL 11     Sig: Inject 1 mL under the skin into the appropriate area as directed Every 28 (Twenty-Eight) Days.        Pharmacy where request should be sent:   PHARMACY     Additional details provided by patient: PT IS COMPLETELY OUT OF MEDICATION     Does the patient have less than a 3 day supply:  [x] Yes  [] No    Jenny Fierro Rep   12/13/21 10:16 EST

## 2021-12-13 NOTE — TELEPHONE ENCOUNTER
Patient informed. \he advised is there are any issues picking up, she would contact our office.   -TMT

## 2021-12-16 ENCOUNTER — OFFICE VISIT (OUTPATIENT)
Dept: CARDIOLOGY | Facility: CLINIC | Age: 26
End: 2021-12-16

## 2021-12-16 VITALS
DIASTOLIC BLOOD PRESSURE: 94 MMHG | WEIGHT: 293 LBS | OXYGEN SATURATION: 96 % | SYSTOLIC BLOOD PRESSURE: 128 MMHG | HEIGHT: 71 IN | HEART RATE: 90 BPM | BODY MASS INDEX: 41.02 KG/M2

## 2021-12-16 DIAGNOSIS — G90.A POTS (POSTURAL ORTHOSTATIC TACHYCARDIA SYNDROME): Primary | ICD-10-CM

## 2021-12-16 PROCEDURE — 93000 ELECTROCARDIOGRAM COMPLETE: CPT | Performed by: PHYSICIAN ASSISTANT

## 2021-12-16 PROCEDURE — 99213 OFFICE O/P EST LOW 20 MIN: CPT | Performed by: PHYSICIAN ASSISTANT

## 2021-12-16 NOTE — PROGRESS NOTES
Ammon Vargas  1995 12/16/2021    [facility] CARDIOLOGY     [facility] [place] [facility] [place]  [address] [address]    Chief Complaint   Patient presents with   • POTS   • MIGRAINES   • Sleep Apnea       Problem List:   1. Syncope  a. Tilt table study 2014 showing cardioinhibitory and vasodepressor component symptomatic.  b. Negative event monitor thousand 14 sinus tachycardia up to 100 bpm less strenuous activities such as daily living  c. Echocardiogram November 7, 2014 EF 53% mild MR mild TR  d. Initiation of midodrine Celexa November 2014  e. Hospitalization February 12, 2015 for syncope events with negative CT scan, negative EEG  f. Initiation of Northera August 2015  2. Complex regional pain syndrome followed by FRIEDA Swift  3. Frequent MRSA infection  4. Migraine headache  5. obesity  6. Surgical history  a. Cholecystectomy  b. Tonsillectomy  c. Nasal surgery        Allergies  Allergies   Allergen Reactions   • Banana Anaphylaxis   • Cantaloupe (Diagnostic) Hives   • Eggs Or Egg-Derived Products Hives, Swelling and Angioedema   • Flu Virus Vaccine Other (See Comments)     Unknown but has an allergy to eggs   • Mushroom Hives   • Shellfish-Derived Products Hives   • Maxalt [Rizatriptan Benzoate] Other (See Comments)     reaction   • Rizatriptan Rash       Current Medications    Current Outpatient Medications:   •  acetaminophen (TYLENOL) 500 MG tablet, Take 500 mg by mouth Every 6 (Six) Hours As Needed for Mild Pain  or Headache., Disp: , Rfl:   •  albuterol sulfate HFA (PROAIR HFA) 108 (90 Base) MCG/ACT inhaler, Inhale 1 puff Every 4 (Four) Hours As Needed for Wheezing., Disp: 18 g, Rfl: 0  •  bisoprolol (ZEBeta) 5 MG tablet, Take 1 tablet by mouth 2 (Two) Times a Day., Disp: 60 tablet, Rfl: 6  •  cetirizine (zyrTEC) 10 MG tablet, Take 1 tablet by mouth Daily., Disp: 30 tablet, Rfl: 11  •  citalopram (CeleXA) 20 MG tablet, Take 1 tablet by mouth every night at bedtime.,  Disp: 90 tablet, Rfl: 1  •  Droxidopa (Northera) 200 MG capsule, Take 200 mg by mouth 3 (Three) Times a Day. NORTHERA.  BRAND NAME ONLY LOU 1. TAKE LAST DOSE  3 HOURS BEFORE BEDTIME, TAKE WITH 300MG TO EQUAL 500 MG TID, Disp: 90 capsule, Rfl: 6  •  droxidopa (Northera) 300 MG capsule capsule, Take 1 capsule by mouth 3 (Three) Times a Day. NORTHERA BRAND NAME ONLY!! LOU 1.TAKE LAST DOSE 3 HOURS BEFORE BEDTIME. TAKE WITH 200MG TO EQUAL 500 MG 3 x DAILY, Disp: 90 capsule, Rfl: 6  •  EPINEPHrine (EpiPen 2-Florencio) 0.3 MG/0.3ML solution auto-injector injection, Inject 0.3 mL into the appropriate muscle as directed by prescriber 1 (One) Time As Needed (anaphylaxis) for up to 1 dose., Disp: 2 each, Rfl: 0  •  Erenumab-aooe (AIMOVIG) 140 MG/ML prefilled syringe, Inject 1 mL under the skin into the appropriate area as directed Every 28 (Twenty-Eight) Days., Disp: 1 mL, Rfl: 11  •  esomeprazole (nexIUM) 40 MG capsule, Take 1 capsule by mouth Daily., Disp: 30 capsule, Rfl: 5  •  fludrocortisone 0.1 MG tablet, Take 2 tablets by mouth Daily With Breakfast., Disp: 60 tablet, Rfl: 6  •  fluticasone (FLONASE) 50 MCG/ACT nasal spray, Use 2 sprays in each nostril as directed by provider Daily., Disp: 16 g, Rfl: 3  •  ibuprofen (ADVIL,MOTRIN) 600 MG tablet, Take 1 tablet by mouth Every 6 (Six) Hours As Needed for Mild Pain ., Disp: 30 tablet, Rfl: 0  •  IRON PO, Take 1 tablet by mouth., Disp: , Rfl:   •  minocycline (MINOCIN,DYNACIN) 100 MG capsule, Take 1 capsule by mouth Daily., Disp: 30 capsule, Rfl: 2  •  NEXPLANON 68 MG implant subdermal implant, , Disp: , Rfl:   •  ondansetron ODT (ZOFRAN-ODT) 4 MG disintegrating tablet, Place 1 tablet on the tongue Every 6 (Six) Hours As Needed for Nausea or Vomiting., Disp: 30 tablet, Rfl: 0  •  vitamin D (ERGOCALCIFEROL) 1.25 MG (86643 UT) capsule capsule, Take 1 capsule by mouth 1 (One) Time Per Week., Disp: 12 capsule, Rfl: 0  •  riFAXIMin (Xifaxan) 550 MG tablet, Take 1 tablet by mouth Every 8  "(Eight) Hours., Disp: 42 tablet, Rfl: 0    History of Present Illness   HPI    Pt presents for follow up of dysautonomia. Since we last saw the pt, pt denies any sustained palpitation episodes, SOB, CP, LH, and dizziness. Denies any hospitalizations, ER visits, bleeding, or TIA/CVA symptoms. She is exercising on a regular basis. She is busy with her two year old daughter    ROS:  General:  Denies fatigue, weight gain or loss  Cardiovascular:  Denies CP, PND, syncope, near syncope, edema or palpitations.  Pulmonary:  Denies TERAN, cough, or wheezing      Vitals:    12/16/21 1424 12/16/21 1435 12/16/21 1436   BP: 124/92 122/62 128/94   BP Location: Right arm Right arm Right arm   Patient Position: Sitting Lying Standing   Pulse: 90 76 90   SpO2: 96%     Weight: (!) 146 kg (322 lb)     Height: 180.3 cm (70.98\")       Body mass index is 44.93 kg/m².  PE:  General: NAD  Neck: no JVD, no carotid bruits, no TM  Heart RRR, NL S1, S2, S4 present, no rubs, murmurs  Lungs: CTA, no wheezes, rhonchi, or rales  Abd: soft, non-tender, NL BS  Ext: No musculoskeletal deformities, no edema, cyanosis, or clubbing  Psych: normal mood and affect    Diagnostic Data:        ECG 12 Lead    Date/Time: 12/16/2021 3:22 PM  Performed by: Sanjeev Hinton PA  Authorized by: Sanjeev Hinton PA   Rhythm: sinus rhythm  Rate: normal  Conduction: conduction normal  ST Segments: ST segments normal  QRS axis: normal    Clinical impression: normal ECG            1. POTS (postural orthostatic tachycardia syndrome)          Plan:  1)  Mitigated on medical therapy.   Continue present medications. -Zebeta, Norhera, Celexa    2) Inappropriate sinus tachycardia- bb      3) Marginal blood pressure: Northera.     8 months follow up Dr. Moran   Electronically signed by VERONICA Ford, 12/16/21, 3:20 PM EST.    "

## 2021-12-21 ENCOUNTER — TELEPHONE (OUTPATIENT)
Dept: CARDIOLOGY | Facility: CLINIC | Age: 26
End: 2021-12-21

## 2021-12-21 NOTE — TELEPHONE ENCOUNTER
[facility]    Key: [id number]

## 2021-12-21 NOTE — TELEPHONE ENCOUNTER
Patient notified. She states [facility] shouldn't have sent anything and it should have been [facility]. She will contact them to see if a PA is needed. I gave her my fax number to give to them if so.

## 2021-12-21 NOTE — TELEPHONE ENCOUNTER
Attempted to do a peer to peer. They stated that this medication is a plan exclusion and the generic could be covered with a PA.

## 2021-12-23 NOTE — TELEPHONE ENCOUNTER
PA denied. They will cover generic but not brand.     I called the patient to notify her. Patient states she cannot afford generic and uses a co-pay card for brand. I suggested to the patient call her insurance company to see if she will be able to afford the generic price for the upcoming calender year as it may have changed.

## 2021-12-23 NOTE — TELEPHONE ENCOUNTER
Previous PA request for Northera 200mg was cancelled in the system...     New PA request received for Northera 200mg through [facility] via covermymeds.  Key: [id number]    Submitted via expedited request.

## 2021-12-29 NOTE — TELEPHONE ENCOUNTER
I received a call from [facility] to complete a PA for the patient's Northera. I told them it was denied. They gave me the number to [facility]. I called and they said the PA was pending. I received another fax from [employer] with   KEY: [id number]    I completed the PA:    Wait for Determination  Please wait for [facility] to return a determination.

## 2022-01-03 ENCOUNTER — SPECIALTY PHARMACY (OUTPATIENT)
Dept: ONCOLOGY | Facility: HOSPITAL | Age: 27
End: 2022-01-03

## 2022-01-12 ENCOUNTER — SPECIALTY PHARMACY (OUTPATIENT)
Dept: ONCOLOGY | Facility: HOSPITAL | Age: 27
End: 2022-01-12

## 2022-01-18 ENCOUNTER — TELEPHONE (OUTPATIENT)
Dept: CARDIOLOGY | Facility: CLINIC | Age: 27
End: 2022-01-18

## 2022-01-18 ENCOUNTER — OFFICE VISIT (OUTPATIENT)
Dept: FAMILY MEDICINE CLINIC | Facility: CLINIC | Age: 27
End: 2022-01-18

## 2022-01-18 VITALS
HEIGHT: 71 IN | OXYGEN SATURATION: 98 % | BODY MASS INDEX: 41.02 KG/M2 | WEIGHT: 293 LBS | DIASTOLIC BLOOD PRESSURE: 80 MMHG | SYSTOLIC BLOOD PRESSURE: 124 MMHG | HEART RATE: 99 BPM

## 2022-01-18 DIAGNOSIS — H66.91 RIGHT OTITIS MEDIA, UNSPECIFIED OTITIS MEDIA TYPE: ICD-10-CM

## 2022-01-18 DIAGNOSIS — J06.9 UPPER RESPIRATORY TRACT INFECTION, UNSPECIFIED TYPE: Primary | ICD-10-CM

## 2022-01-18 DIAGNOSIS — H92.01 RIGHT EAR PAIN: ICD-10-CM

## 2022-01-18 LAB
EXPIRATION DATE: NORMAL
FLUAV AG NPH QL: NEGATIVE
FLUBV AG NPH QL: NEGATIVE
INTERNAL CONTROL: NORMAL
Lab: NORMAL

## 2022-01-18 PROCEDURE — U0004 COV-19 TEST NON-CDC HGH THRU: HCPCS | Performed by: PHYSICIAN ASSISTANT

## 2022-01-18 PROCEDURE — 99213 OFFICE O/P EST LOW 20 MIN: CPT | Performed by: PHYSICIAN ASSISTANT

## 2022-01-18 PROCEDURE — 87804 INFLUENZA ASSAY W/OPTIC: CPT | Performed by: PHYSICIAN ASSISTANT

## 2022-01-18 RX ORDER — AMOXICILLIN AND CLAVULANATE POTASSIUM 875; 125 MG/1; MG/1
1 TABLET, FILM COATED ORAL 2 TIMES DAILY
Qty: 14 TABLET | Refills: 0 | Status: SHIPPED | OUTPATIENT
Start: 2022-01-18 | End: 2022-01-25

## 2022-01-18 NOTE — TELEPHONE ENCOUNTER
Michelle LANGE with [facility], called to let us know that they are not contracted with the patient's new insurance. Therefore they will now be able to dispense Northera for the patient.       I called and spoke with the patient this morning. I explained all of this to her and let her know that she has to submit the appropriate insurance so we can continue to dispense Northera for her. She said that she would take care of it later because she was on her way to the doctor because she is really sick.         I called Michelle with [facility] back and made her aware that the patient is going to submit the correct insurance. I explained to her that this will have to happen before we can submit the appropriate prior authorization. She is going to keep the appeal open for one more week before she closes it out.        Michelle  P)[phone]  Ext. [phone]

## 2022-01-18 NOTE — PROGRESS NOTES
"    Chief Complaint   Patient presents with   • URI     ear drainage right side since this morning. Has been taking aleve. Nasal congestion    • Fever       HPI     Ammon Vargas is a pleasant 26 y.o. female who presents for evaluation of \"chief complaint.\"   Woke up with right ear pain, clear, sticky drainage this morning. Today has nasal congestion. She states her temperature was 100.3 this morning, took Aleve. Has not been vaccinated for COVID. She did have a cold about 6 weeks ago. She has a history of ear infections and strep throat.     Past Medical History:   Diagnosis Date   • Asthma    • Autonomic failure    • CRPS (complex regional pain syndrome type I)    • Fibromyalgia    • Fibromyalgia    • Migraine    • Migraines    • MRSA carrier    • POTS (postural orthostatic tachycardia syndrome)    • POTS (postural orthostatic tachycardia syndrome)    • Pure autonomic failure        Past Surgical History:   Procedure Laterality Date   • ADENOIDECTOMY     • CHOLECYSTECTOMY     • EAR TUBES     • TONSILLECTOMY AND ADENOIDECTOMY         Family History   Problem Relation Age of Onset   • Diabetes Mother    • Cancer Maternal Grandmother    • Diabetes Maternal Grandmother    • Kidney disease Maternal Grandmother    • Stroke Maternal Grandmother    • Cancer Maternal Grandfather    • Heart attack Maternal Grandfather    • Hearing loss Maternal Grandfather    • Stroke Maternal Grandfather    • Cancer Paternal Grandfather    • Hearing loss Paternal Grandfather    • Heart attack Paternal Grandfather    • No Known Problems Father        Social History     Socioeconomic History   • Marital status:      Spouse name: Adrian Vargas   Tobacco Use   • Smoking status: Never Smoker   • Smokeless tobacco: Never Used   Vaping Use   • Vaping Use: Every day   • Substances: Nicotine   • Devices: Refillable tank   Substance and Sexual Activity   • Alcohol use: No     Comment: SOCIAL   • Drug use: No   • Sexual activity: Defer "       Allergies   Allergen Reactions   • Banana Anaphylaxis   • Cantaloupe (Diagnostic) Hives   • Eggs Or Egg-Derived Products Hives, Swelling and Angioedema   • Influenza Virus Vaccine Other (See Comments)     Unknown but has an allergy to eggs   • Mushroom Hives   • Shellfish-Derived Products Hives   • Maxalt [Rizatriptan Benzoate] Other (See Comments)     reaction   • Rizatriptan Rash       ROS    Review of Systems   Constitutional: Positive for chills and fever.   HENT: Positive for congestion. Negative for sore throat.    Respiratory: Negative for cough and shortness of breath.    Gastrointestinal: Negative for diarrhea and vomiting.   Musculoskeletal: Negative for myalgias.   Neurological: Negative for headache.       Vitals:    01/18/22 1240   BP: 124/80   Pulse: 99   SpO2: 98%     Body mass index is 44.94 kg/m².      Current Outpatient Medications:   •  acetaminophen (TYLENOL) 500 MG tablet, Take 500 mg by mouth Every 6 (Six) Hours As Needed for Mild Pain  or Headache., Disp: , Rfl:   •  albuterol sulfate HFA (PROAIR HFA) 108 (90 Base) MCG/ACT inhaler, Inhale 1 puff Every 4 (Four) Hours As Needed for Wheezing., Disp: 18 g, Rfl: 0  •  bisoprolol (ZEBeta) 5 MG tablet, Take 1 tablet by mouth 2 (Two) Times a Day., Disp: 60 tablet, Rfl: 6  •  cetirizine (zyrTEC) 10 MG tablet, Take 1 tablet by mouth Daily., Disp: 30 tablet, Rfl: 11  •  citalopram (CeleXA) 20 MG tablet, Take 1 tablet by mouth every night at bedtime., Disp: 90 tablet, Rfl: 1  •  Droxidopa (Northera) 200 MG capsule, Take 200 mg by mouth 3 (Three) Times a Day. NORTHERA.  BRAND NAME ONLY LOU 1. TAKE LAST DOSE  3 HOURS BEFORE BEDTIME, TAKE WITH 300MG TO EQUAL 500 MG TID, Disp: 90 capsule, Rfl: 6  •  droxidopa (Northera) 300 MG capsule capsule, Take 1 capsule by mouth 3 (Three) Times a Day. NORTHERA BRAND NAME ONLY!! LOU 1.TAKE LAST DOSE 3 HOURS BEFORE BEDTIME. TAKE WITH 200MG TO EQUAL 500 MG 3 x DAILY, Disp: 90 capsule, Rfl: 6  •  EPINEPHrine (EpiPen  2-Florencio) 0.3 MG/0.3ML solution auto-injector injection, Inject 0.3 mL into the appropriate muscle as directed by prescriber 1 (One) Time As Needed (anaphylaxis) for up to 1 dose., Disp: 2 each, Rfl: 0  •  esomeprazole (nexIUM) 40 MG capsule, Take 1 capsule by mouth Daily., Disp: 30 capsule, Rfl: 5  •  fludrocortisone 0.1 MG tablet, Take 2 tablets by mouth Daily With Breakfast., Disp: 60 tablet, Rfl: 6  •  fluticasone (FLONASE) 50 MCG/ACT nasal spray, Use 2 sprays in each nostril as directed by provider Daily., Disp: 16 g, Rfl: 3  •  ibuprofen (ADVIL,MOTRIN) 600 MG tablet, Take 1 tablet by mouth Every 6 (Six) Hours As Needed for Mild Pain ., Disp: 30 tablet, Rfl: 0  •  IRON PO, Take 1 tablet by mouth., Disp: , Rfl:   •  minocycline (MINOCIN,DYNACIN) 100 MG capsule, Take 1 capsule by mouth Daily., Disp: 30 capsule, Rfl: 2  •  NEXPLANON 68 MG implant subdermal implant, , Disp: , Rfl:   •  ondansetron ODT (ZOFRAN-ODT) 4 MG disintegrating tablet, Place 1 tablet on the tongue Every 6 (Six) Hours As Needed for Nausea or Vomiting., Disp: 30 tablet, Rfl: 0  •  riFAXIMin (Xifaxan) 550 MG tablet, Take 1 tablet by mouth Every 8 (Eight) Hours., Disp: 42 tablet, Rfl: 0  •  vitamin D (ERGOCALCIFEROL) 1.25 MG (45916 UT) capsule capsule, Take 1 capsule by mouth 1 (One) Time Per Week., Disp: 12 capsule, Rfl: 0  •  amoxicillin-clavulanate (AUGMENTIN) 875-125 MG per tablet, Take 1 tablet by mouth 2 (Two) Times a Day for 7 days., Disp: 14 tablet, Rfl: 0    PE    Physical Exam  Vitals reviewed.   Constitutional:       General: She is not in acute distress.     Appearance: She is well-developed.   HENT:      Head: Normocephalic.      Right Ear: Ear canal normal. A middle ear effusion is present. Tympanic membrane is erythematous.      Left Ear: Tympanic membrane and ear canal normal. Tympanic membrane is not erythematous.      Ears:      Comments: Superior and inferior TM erythematous with effusion     Mouth/Throat:      Mouth: Mucous  membranes are moist.      Pharynx: Oropharynx is clear. Uvula midline. No posterior oropharyngeal erythema.      Tonsils: No tonsillar exudate.   Eyes:      General:         Right eye: No discharge.         Left eye: No discharge.      Conjunctiva/sclera: Conjunctivae normal.   Cardiovascular:      Rate and Rhythm: Normal rate and regular rhythm.      Heart sounds: Normal heart sounds.   Pulmonary:      Effort: Pulmonary effort is normal. No respiratory distress.      Breath sounds: Normal breath sounds. No wheezing, rhonchi or rales.   Chest:   Breasts:      Right: No supraclavicular adenopathy.      Left: No supraclavicular adenopathy.       Musculoskeletal:      Cervical back: Normal range of motion and neck supple.   Lymphadenopathy:      Cervical: No cervical adenopathy.      Upper Body:      Right upper body: No supraclavicular adenopathy.      Left upper body: No supraclavicular adenopathy.   Skin:     General: Skin is warm and dry.   Psychiatric:         Behavior: Behavior normal.          A/P    Problem List Items Addressed This Visit     None      Visit Diagnoses     Upper respiratory tract infection, unspecified type    -  Primary  -Influenza swab is negative. Order COVID test. Quarantine until test result returns  -Rx augmentin to treat otitis media  -May use NSAIDs OTC or Tylenol prn pain    Relevant Medications    amoxicillin-clavulanate (AUGMENTIN) 875-125 MG per tablet    Other Relevant Orders    POCT Influenza A/B (Completed)    COVID-19 PCR, [facility], NP SWAB IN [facility]SP[facility] MEDIA/ORAL SWISH 24-30 HR TAT - Swab, Nasopharynx      Right otitis media, unspecified otitis media type        Right ear pain              Plan of care was reviewed with patient at the conclusion of today's visit. Education was provided regarding diagnoses, management, prescribed or recommended OTC products, and the importance of compliance with follow-up appointments. The patient was counseled regarding the risks,  benefits, and possible side-effects of treatment. I advised the patient to keep me informed of any acute changes in their status including new, worsening, or persistent symptoms. Patient expresses understanding and agreement with the management plan.        VERONICA Rosas

## 2022-01-19 ENCOUNTER — TELEPHONE (OUTPATIENT)
Dept: NEUROLOGY | Facility: CLINIC | Age: 27
End: 2022-01-19

## 2022-01-19 LAB — SARS-COV-2 RNA NOSE QL NAA+PROBE: DETECTED

## 2022-01-19 NOTE — TELEPHONE ENCOUNTER
LIAN Stewart----- Message from Pat Phan, NehemiasD sent at 1/13/2022  8:10 AM EST -----  Regarding: Patient needs appointment  Ammon had a formulary change with her insurance, and will need to change from Aimovig to Ajovy.  We have been notifying the provider and making the change, but she has not been seen since 3/2/2020.  I did leave her a message that she would need to make an appointment, but thought someone from the office may want reach out to schedule.    Thank you!

## 2022-02-04 RX ORDER — DROXIDOPA 300 MG/1
300 CAPSULE ORAL 3 TIMES DAILY
Qty: 270 CAPSULE | Refills: 3 | Status: SHIPPED | OUTPATIENT
Start: 2022-02-04 | End: 2022-12-29

## 2022-02-04 RX ORDER — DROXIDOPA 200 MG/1
200 CAPSULE ORAL 3 TIMES DAILY
Qty: 270 CAPSULE | Refills: 3 | Status: ON HOLD | OUTPATIENT
Start: 2022-02-04 | End: 2023-03-20 | Stop reason: SDUPTHER

## 2022-02-07 ENCOUNTER — SPECIALTY PHARMACY (OUTPATIENT)
Dept: ONCOLOGY | Facility: HOSPITAL | Age: 27
End: 2022-02-07

## 2022-02-07 NOTE — PROGRESS NOTES
Specialty Pharmacy Refill Coordination Note     Ammon is a 26 y.o. female contacted today regarding refills of  Ajovy specialty medication(s).    Reviewed and verified with patient:  Allergies  Meds  Problems       Specialty medication(s) and dose(s) confirmed: yes    Refill Questions      Most Recent Value   Changes to allergies? No   Changes to medications? Yes  [Aimovig changed to Ajovy]   New conditions since last clinic visit No   Unplanned office visit, urgent care, ED, or hospital admission in the last 4 weeks  No   How does patient/caregiver feel medication is working? Fair   Financial problems or insurance changes  Yes   If yes, describe changes in insurance or financial issues. Insurance formulay change - Aimovig to Ajovy   How many doses of your specialty medications were missed in the last 4 weeks? 1 dose   Why were doses missed? Patient did not return greater than 6 outreaches, until she received disenrollemnt letter   Does this patient require a clinical escalation to a pharmacist? No  [Pharmacist did education and clinical asessment today]          Delivery Questions      Most Recent Value   Delivery method FedEx   Delivery address correct? Yes   Preferred delivery time? Anytime   Number of medications in delivery 1   Medication being filled and delivered Ajovy   Doses left of specialty medications none   Is there any medication that is due not being filled? No   Supplies needed? No supplies needed   Cooler needed? Yes   Do any medications need mixed or dated? No   Copay form of payment Payment plan already set up   Questions or concerns for the pharmacist? No   Are any medications first time fills? Yes            Medication Adherence    Any gaps in refill history greater than 2 weeks in the last 3 months: yes  Demonstrates understanding of importance of adherence: yes  Informant: patient  Reliability of informant: reliable  Provider-estimated medication adherence level: %   Other  non-adherence reason: Patient has not responded to our calls for the past 7 weeks.  Patient called back after disenrollment letter sent.  Patient requested to be called on Thursdays and Fridays as she understands the importance of speaking with us each month.   Adherence tools used: calendar  Support network for adherence: healthcare provider          Follow-up: 28 day(s)     Pat Phan, PharmD  2/7/2022

## 2022-02-07 NOTE — PROGRESS NOTES
Aimovig changed to Ajovy due to insurance formulary change.  Sending Ajovy x1 today, and will send refills after patient is seen by provider on 2/18/2022 as she is overdue for an appointment with the provider.

## 2022-02-07 NOTE — PROGRESS NOTES
Specialty Pharmacy Patient Management Program  Neurology Initial Assessment     Ammon Vargas is a 26 y.o. female with Migraines seen by a Neurology provider and enrolled in the Neurology Patient Management program offered by [facility].  An initial outreach was conducted, including assessment of therapy appropriateness and specialty medication education for Ajovy. The patient was introduced to services offered by [facility], including: regular assessments, refill coordination, curbside pick-up or mail order delivery options, prior authorization maintenance, and financial assistance programs as applicable. The patient was also provided with contact information for the pharmacy team.     Insurance Coverage & Financial Support  [employer] co-pay card    Relevant Past Medical History and Comorbidities  Relevant medical history and concomitant health conditions were discussed with the patient. The patient's chart has been reviewed for relevant past medical history and comorbid health conditions and updated as necessary.   Past Medical History:   Diagnosis Date   • Asthma    • Autonomic failure    • CRPS (complex regional pain syndrome type I)    • Fibromyalgia    • Fibromyalgia    • Migraine    • Migraines    • MRSA carrier    • POTS (postural orthostatic tachycardia syndrome)    • POTS (postural orthostatic tachycardia syndrome)    • Pure autonomic failure      Social History     Socioeconomic History   • Marital status:      Spouse name: Adrian Vargas   Tobacco Use   • Smoking status: Never Smoker   • Smokeless tobacco: Never Used   Vaping Use   • Vaping Use: Every day   • Substances: Nicotine   • Devices: Refillable tank   Substance and Sexual Activity   • Alcohol use: No     Comment: SOCIAL   • Drug use: No   • Sexual activity: Defer       Problem list reviewed by Pat Phan, PharmD on 2/7/2022 at  9:01 AM    Allergies  Known allergies and  reactions were discussed with the patient. The patient's chart has been reviewed for  allergy information and updated as necessary.   Banana, Cantaloupe (diagnostic), Eggs or egg-derived products, Influenza virus vaccine, Mushroom, Shellfish-derived products, Maxalt [rizatriptan benzoate], and Rizatriptan    Allergies reviewed by Pat Phan, PharmD on 2/7/2022 at  8:54 AM    Current Medication List  This medication list has been reviewed with the patient and evaluated for any interactions or necessary modifications/recommendations, and updated to include all prescription medications, OTC medications, and supplements the patient is currently taking.  This list reflects what is contained in the patient's profile, which has also been marked as reviewed to communicate to other providers it is the most up to date version of the patient's current medication therapy.     Current Outpatient Medications:   •  acetaminophen (TYLENOL) 500 MG tablet, Take 500 mg by mouth Every 6 (Six) Hours As Needed for Mild Pain  or Headache., Disp: , Rfl:   •  albuterol sulfate HFA (PROAIR HFA) 108 (90 Base) MCG/ACT inhaler, Inhale 1 puff Every 4 (Four) Hours As Needed for Wheezing., Disp: 18 g, Rfl: 0  •  bisoprolol (ZEBeta) 5 MG tablet, Take 1 tablet by mouth 2 (Two) Times a Day., Disp: 60 tablet, Rfl: 6  •  cetirizine (zyrTEC) 10 MG tablet, Take 1 tablet by mouth Daily., Disp: 30 tablet, Rfl: 11  •  citalopram (CeleXA) 20 MG tablet, Take 1 tablet by mouth every night at bedtime., Disp: 90 tablet, Rfl: 1  •  Droxidopa (Northera) 200 MG capsule, Take 200 mg by mouth 3 (Three) Times a Day. NORTHERA. BRAND NAME ONLY. TAKE LAST DOSE  3 HOURS BEFORE BEDTIME, TAKE WITH 300MG TO EQUAL 500 MG 3 times daily, Disp: 270 capsule, Rfl: 3  •  droxidopa (Northera) 300 MG capsule capsule, Take 1 capsule by mouth 3 (Three) Times a Day. NORTHERA BRAND NAME ONLY! TAKE LAST DOSE 3 HOURS BEFORE BEDTIME. TAKE WITH 200MG TO EQUAL 500 MG 3 times DAILY, Disp:  270 capsule, Rfl: 3  •  EPINEPHrine (EpiPen 2-Florencio) 0.3 MG/0.3ML solution auto-injector injection, Inject 0.3 mL into the appropriate muscle as directed by prescriber 1 (One) Time As Needed (anaphylaxis) for up to 1 dose., Disp: 2 each, Rfl: 0  •  esomeprazole (nexIUM) 40 MG capsule, Take 1 capsule by mouth Daily., Disp: 30 capsule, Rfl: 5  •  fludrocortisone 0.1 MG tablet, Take 2 tablets by mouth Daily With Breakfast., Disp: 60 tablet, Rfl: 6  •  fluticasone (FLONASE) 50 MCG/ACT nasal spray, Use 2 sprays in each nostril as directed by provider Daily., Disp: 16 g, Rfl: 3  •  Fremanezumab-vfrm 225 MG/1.5ML solution auto-injector, Inject 225 mg under the skin into the appropriate area as directed Every 28 (Twenty-Eight) Days., Disp: 1.5 mL, Rfl: 0  •  ibuprofen (ADVIL,MOTRIN) 600 MG tablet, Take 1 tablet by mouth Every 6 (Six) Hours As Needed for Mild Pain ., Disp: 30 tablet, Rfl: 0  •  IRON PO, Take 1 tablet by mouth., Disp: , Rfl:   •  minocycline (MINOCIN,DYNACIN) 100 MG capsule, Take 1 capsule by mouth Daily., Disp: 30 capsule, Rfl: 2  •  NEXPLANON 68 MG implant subdermal implant, , Disp: , Rfl:   •  ondansetron ODT (ZOFRAN-ODT) 4 MG disintegrating tablet, Place 1 tablet on the tongue Every 6 (Six) Hours As Needed for Nausea or Vomiting., Disp: 30 tablet, Rfl: 0  •  riFAXIMin (Xifaxan) 550 MG tablet, Take 1 tablet by mouth Every 8 (Eight) Hours., Disp: 42 tablet, Rfl: 0  •  vitamin D (ERGOCALCIFEROL) 1.25 MG (93251 UT) capsule capsule, Take 1 capsule by mouth 1 (One) Time Per Week., Disp: 12 capsule, Rfl: 0    Medicines reviewed by Pat Phan, PharmD on 2/7/2022 at  8:54 AM    Drug Interactions  none     Recommended Medications Assessment    None      Relevant Laboratory Values    Common labs    Common Labsle 5/11/21   Hemoglobin A1C 5.6             Initial Education Provided for Specialty Medication  The patient has been provided with the following education and any applicable administration techniques (i.e.  self-injection) have been demonstrated for the therapies indicated. All questions and concerns have been addressed prior to the patient receiving the medication, and the patient has verbalized understanding of the education and any materials provided.  Additional patient education shall be provided and documented upon request by the patient, provider or payer.                   Ajovy (fremanezumab-vfrm) 225 mg subQ every 28 days           Medication Expectations   Why am I taking this medication? You are taking this medication for migraine prophylaxis.   What should I expect while on this medication? You should expect to a decrease in the frequency and severity of your migraines.   How does the medication work? Ajovy is a monoclonal antibody that binds to calcitonin gene-related peptide (CGRP) and blocks its binding to the receptor decreasing the severity of migraines.   How long will I be on this medication for? The amount of time you will be on this medication will be determined by your doctor and your response to the medication.    How do I take this medication? Take as directed on your prescription label. This medication is a self-injection given every 28 days.    What are some possible side effects? Injection site reactions and hypersensitivity reactions.   What happens if I miss a dose? If you miss a dose, take it as soon as you remember, and time next dose 28 days from last dose.                  Medication Safety   What are things I should warn my doctor immediately about? Cases of anaphylaxis and angioedema have been reported in the postmarketing setting. If a reaction occurs, notify your doctor immediately.   What are things that I should be cautious of? Injection site reaction and hypersensitivity reactions, including rash, pruritus, drug hypersensitivity, and urticaria   What are some medications that can interact with this one? No drug interactions identified.            Medication Storage/Handling    How should I handle this medication? Keep this medication our of reach of pets/children in original container.  On the day your Ajovy is due let it set at room temperature for 30 minutes prior to injection. (do NOT warm using a heat source such as hot water or a microwave).  Administer in the abdomen, thigh, back of the upper arm, or buttocks.  Do not inject where the skin is tender, bruised, red or hard.  Rotate injection sites.   How does this medication need to be stored? Store in refrigerator and keep dry.   How should I dispose of this medication? You can dispose of the empty syringe in a sharps container, and if this is not available you may use an empty hard plastic container such as a milk jug or tide container.            Resources/Support   How can I remind myself to take this medication? You can download a reminder jemal on your phone or use a calandar  to help with your monthly injection.   Is financial support available?  Yes, [employer] can provide co-pay cards if you have commercial insurance or patient assistance if you have Medicare or no insurance.    Which vaccines are recommended for me? Talk to your doctor about these vaccines: Flu, Coronavirus (COVID-19), Pneumococcal (pneumonia), Tdap, Hepatitis B, Zoster (shingles)                    Adherence and Self-Administration  • Barriers to Patient Adherence and/or Self-Administration: none    • Methods for Supporting Patient Adherence and/or Self-Administration: CGRP self-injection demonstration done in office    Goals of Therapy  Goals     • Specialty Pharmacy General Goal      Decrease in severity and frequency of migraines             Reassessment Plan & Follow-Up  1. Medication Therapy Changes: Aimovig to Ajovy due to insurance formulary change.   2. Additional Plans, Therapy Recommendations, or Therapy Problems to Be Addressed: Patient was overdue to appointment with provider - office contacted and appointment was scheduled for  2/18/2022.  Plan is to send Ajovy x1 today, and will send refills after patient is seen by provider. Discussed importance of monthly outreaches, with patient asking to be called on Thursdays and Fridays as she is off on those days.   3. Pharmacist to perform regular reassessments no more than (6) months from the previous assessment.  4. Welcome information and patient satisfaction survey to be sent by retail team with patient's initial fill.  5. Care Coordinator to set up future refill outreaches, coordinate prescription delivery, and escalate clinical questions to pharmacist.     Attestation  I attest that the initiated specialty medication(s) are appropriate for the patient based on my assessment.  If the prescribed therapy is at any point deemed not appropriate based on the current or future assessments, a consultation will be initiated with the patient's specialty care provider to determine the best course of action. The revised plan of therapy will be documented along with any additional patient education provided.     Pat Phan, Valerio  2/7/2022  09:14 EST

## 2022-02-08 RX ORDER — CITALOPRAM 20 MG/1
TABLET ORAL
Qty: 90 TABLET | Refills: 1 | Status: SHIPPED | OUTPATIENT
Start: 2022-02-08 | End: 2022-09-14 | Stop reason: SDUPTHER

## 2022-02-08 NOTE — TELEPHONE ENCOUNTER
Rx Refill Note  Requested Prescriptions     Pending Prescriptions Disp Refills   • citalopram (CeleXA) 20 MG tablet [Pharmacy Med Name: CITALOPRAM HBR 20 MG TABLET] 30 tablet      Sig: TAKE ONE TABLET BY MOUTH EVERY NIGHT AT BEDTIME      Last office visit with prescribing clinician: 1/18/2022      Next office visit with prescribing clinician: Visit date not found            Krystin Rivero MA  02/08/22, 11:03 EST

## 2022-02-14 ENCOUNTER — TELEPHONE (OUTPATIENT)
Dept: CARDIOLOGY | Facility: CLINIC | Age: 27
End: 2022-02-14

## 2022-02-14 NOTE — TELEPHONE ENCOUNTER
Patient notified and aware that [facility] denied brand Northera. She stated that she will f/u with her insurance company.

## 2022-02-18 ENCOUNTER — OFFICE VISIT (OUTPATIENT)
Dept: NEUROLOGY | Facility: CLINIC | Age: 27
End: 2022-02-18

## 2022-02-18 VITALS
OXYGEN SATURATION: 97 % | BODY MASS INDEX: 41.02 KG/M2 | HEIGHT: 71 IN | WEIGHT: 293 LBS | DIASTOLIC BLOOD PRESSURE: 78 MMHG | HEART RATE: 106 BPM | SYSTOLIC BLOOD PRESSURE: 126 MMHG

## 2022-02-18 DIAGNOSIS — F33.41 RECURRENT MAJOR DEPRESSIVE DISORDER, IN PARTIAL REMISSION: ICD-10-CM

## 2022-02-18 DIAGNOSIS — G90.A POTS (POSTURAL ORTHOSTATIC TACHYCARDIA SYNDROME): Chronic | ICD-10-CM

## 2022-02-18 DIAGNOSIS — G43.109 MIGRAINE WITH AURA AND WITHOUT STATUS MIGRAINOSUS, NOT INTRACTABLE: Primary | Chronic | ICD-10-CM

## 2022-02-18 PROBLEM — G43.909 MIGRAINES: Chronic | Status: ACTIVE | Noted: 2020-02-07

## 2022-02-18 PROCEDURE — 99214 OFFICE O/P EST MOD 30 MIN: CPT | Performed by: PSYCHIATRY & NEUROLOGY

## 2022-02-18 NOTE — ASSESSMENT & PLAN NOTE
Headaches are worsening.  Medication changes per orders.     Load with Ajovy 2 additional shots.  Nurtec samples.

## 2022-02-18 NOTE — PROGRESS NOTES
"Chief Complaint    Migraine    Subjective          Ammon Vargas presents to [facility]UR[facility]     History of Present Illness    26 y.o. female returns in follow up.  Last visit on 3/2/2020 rx Aimovig.    Aimovig helpful but insurance stopped coverage.  Switched to Ajovy and had one injection..      HA frequency is 4 in last 10 days.  Missed two days a work.      Located in forehead and spread to back of head.  Quality is throbbing. Last for a day.  Moderate to severe intensity.  Assoc sx of light, sound sensitivity, N/V.  No further passing out spells.  Northera controlling sx.       Mood is stable.     Allergic to Maxalt.  Relpax stopped working.       Dx with CRPS with pain in left arm and right hip and leg.       Preventatives:  TPM, Cymbalta, GBP,      Reviewed medical records:     HA qod and 8 severe HA a month.  Previously followed at  Neurology.  Follows with Cardiology for POTS.       MRI Brain/spine and LP unremarkable.    Objective   Vital Signs:   /78   Pulse 106   Ht 180.3 cm (70.98\")   Wt (!) 146 kg (322 lb)   SpO2 97%   BMI 44.93 kg/m²     Physical Exam  Eyes:      Extraocular Movements: EOM normal.      Pupils: Pupils are equal, round, and reactive to light.   Neurological:      Mental Status: She is oriented to person, place, and time.      Gait: Gait is intact.      Deep Tendon Reflexes: Strength normal.   Psychiatric:         Speech: Speech normal.          Neurologic Exam     Mental Status   Oriented to person, place, and time.   Speech: speech is normal   Level of consciousness: alert  Knowledge: good and consistent with education.   Normal comprehension.     Cranial Nerves   Cranial nerves II through XII intact.     CN II   Visual fields full to confrontation.   Visual acuity: normal  Right visual field deficit: none  Left visual field deficit: none     CN III, IV, VI   Pupils are equal, round, and reactive to light.  Extraocular motions are normal. "   Nystagmus: none   Diplopia: none  Ophthalmoparesis: none  Upgaze: normal  Downgaze: normal  Conjugate gaze: present    CN V   Facial sensation intact.   Right corneal reflex: normal  Left corneal reflex: normal    CN VII   Right facial weakness: none  Left facial weakness: none    CN VIII   Hearing: intact    CN IX, X   Palate: symmetric  Right gag reflex: normal  Left gag reflex: normal    CN XI   Right sternocleidomastoid strength: normal  Left sternocleidomastoid strength: normal    CN XII   Tongue: not atrophic  Fasciculations: absent  Tongue deviation: none    Motor Exam   Muscle bulk: normal  Overall muscle tone: normal    Strength   Strength 5/5 throughout.     Sensory Exam   Light touch normal.     Gait, Coordination, and Reflexes     Gait  Gait: normal    Tremor   Resting tremor: absent  Intention tremor: absent  Action tremor: absent    Reflexes   Reflexes 2+ except as noted.      Result Review :   The following data was reviewed by: Arie Wharton MD on 02/18/2022:  Common labs    Common Labsle 5/11/21   Hemoglobin A1C 5.6                     Assessment and Plan    Diagnoses and all orders for this visit:    1. Migraine with aura and without status migrainosus, not intractable (Primary)  Assessment & Plan:  Headaches are worsening.  Medication changes per orders.     Load with Ajovy 2 additional shots.  Nurtec samples.            2. POTS (postural orthostatic tachycardia syndrome)  Assessment & Plan:  Northera       3. Recurrent major depressive disorder, in partial remission ([facility])  Assessment & Plan:  Patient's depression is recurrent and is mild without psychosis. Their depression is currently in partial remission and the condition is improving with treatment. This will be reassessed at the next regular appointment. F/U as described:patient will continue current medication therapy.        Follow Up   No follow-ups on file.  Patient was given instructions and counseling regarding her condition or  for health maintenance advice. Please see specific information pulled into the AVS if appropriate.

## 2022-03-03 ENCOUNTER — SPECIALTY PHARMACY (OUTPATIENT)
Dept: ONCOLOGY | Facility: HOSPITAL | Age: 27
End: 2022-03-03

## 2022-03-03 NOTE — PROGRESS NOTES
Specialty Pharmacy Refill Coordination Note     Ammon is a 26 y.o. female contacted today regarding refills of  Ajvoy specialty medication(s).    Reviewed and verified with patient: Yes  Specialty medication(s) and dose(s) confirmed: yes    Refill Questions      Most Recent Value   Changes to allergies? No   Changes to medications? No   New conditions since last clinic visit No   Unplanned office visit, urgent care, ED, or hospital admission in the last 4 weeks  No   How does patient/caregiver feel medication is working? Excellent   Financial problems or insurance changes  No   If yes, describe changes in insurance or financial issues. N/A   How many doses of your specialty medications were missed in the last 4 weeks? 0   Why were doses missed? N/A   Does this patient require a clinical escalation to a pharmacist? No          Delivery Questions      Most Recent Value   Delivery method FedEx   Delivery address correct? Yes   Preferred delivery time? Anytime   Number of medications in delivery 1   Medication being filled and delivered Ajovy   Doses left of specialty medications 0   Is there any medication that is due not being filled? No   Supplies needed? No supplies needed   Cooler needed? Yes   Do any medications need mixed or dated? No   Copay form of payment Credit card on file   Questions or concerns for the pharmacist? No   Are any medications first time fills? Yes            Medication Adherence    Adherence tools used: calendar  Support network for adherence: healthcare provider          Follow-up: 28 day(s)     Carly Hopkins, Pharmacy Technician  Specialty Pharmacy Technician

## 2022-03-10 ENCOUNTER — OFFICE VISIT (OUTPATIENT)
Dept: OBSTETRICS AND GYNECOLOGY | Facility: CLINIC | Age: 27
End: 2022-03-10

## 2022-03-10 VITALS — SYSTOLIC BLOOD PRESSURE: 120 MMHG | DIASTOLIC BLOOD PRESSURE: 84 MMHG | WEIGHT: 293 LBS | BODY MASS INDEX: 45.21 KG/M2

## 2022-03-10 DIAGNOSIS — Z30.09 FAMILY PLANNING: Primary | ICD-10-CM

## 2022-03-10 DIAGNOSIS — Z30.46 ENCOUNTER FOR NEXPLANON REMOVAL: ICD-10-CM

## 2022-03-10 PROBLEM — Z97.5 NEXPLANON IN PLACE: Status: RESOLVED | Noted: 2021-06-02 | Resolved: 2022-03-10

## 2022-03-10 PROCEDURE — 11982 REMOVE DRUG IMPLANT DEVICE: CPT | Performed by: OBSTETRICS & GYNECOLOGY

## 2022-03-10 PROCEDURE — 99212 OFFICE O/P EST SF 10 MIN: CPT | Performed by: OBSTETRICS & GYNECOLOGY

## 2022-03-10 NOTE — PROGRESS NOTES
Procedure: Nexplanon removal    Remove Drug Implant Device    Date/Time: 3/10/2022 10:58 AM  Performed by: Paola Call MD  Authorized by: Paola Call MD   Preparation: Patient was prepped and draped in the usual sterile fashion.  Local anesthesia used: yes  Anesthesia: local infiltration    Anesthesia:  Local anesthesia used: yes  Local Anesthetic: lidocaine 1% with epinephrine  Anesthetic total: 3 mL    Sedation:  Patient sedated: no    Patient tolerance: patient tolerated the procedure well with no immediate complications          Pre Dx: 1) Nexplanon removal  Post Dx: 1) Nexplanon removal     Pt. Desires removal of Nexplanon due to side effects and planning pregnancy.    The risks, benefits, and alternatives to removal and reinsertion of the device have been discussed with the patient at length. All of her questions are answered. She is aware of the need for alternative means of contraception if desired. Verbal informed consent is obtained.    Able to easily palpate the device in the  Left arm. Additional imaging was not required. The device feels freely mobile and easily accessible. She was placed in the examining table in a supine position with her arm flexed at the elbow and hand under her head. The skin over this site is prepped with Betadine. 2-3 mL of 1% lidocaine with epinephrine was injected.    Downward pressure was applied on the proximal end of the implant nearest the axilla, and a 2-3 mm incision was made in a longitudinal direction of the arm at the tip of the implant closest to the elbow. The implant was then pushed gently toward the incision until the tip was visible. The fibrous capsule was opened with blunt dissection using a hemostat. The implant was grasp with a hemostat and was easily removed intact. It measured a  full 4 cm in length.    Steristrip was placed over incision site as well as a pressure dressing.     Tolerated well  No apparent complications  She was advised  to call or return for complications such as fever, signs of infection, increased pain, or any other concerns.    Warning signs, limitations and expectations reviewed.     Paola Call MD  03/10/2022      Chief Complaint   Patient presents with   • Procedure     Nexplanon removal        Subjective   HPI  Ammon Vargas is a 26 y.o. female, , LMP was on No LMP recorded. Patient has had an implant. who presents for Family Planning.    Her periods are irregular  Secondary to Nexplanon. Prior to having Nexplanon and her periods were regular.  She is considering to conceive in the next month and her current method of contraception is contraceptive methods: Nexplanon.    Partner Status: Marital Status: .  New Partners since last visit: no.        Current Outpatient Medications on File Prior to Visit   Medication Sig Dispense Refill   • acetaminophen (TYLENOL) 500 MG tablet Take 500 mg by mouth Every 6 (Six) Hours As Needed for Mild Pain  or Headache.     • albuterol sulfate HFA (PROAIR HFA) 108 (90 Base) MCG/ACT inhaler Inhale 1 puff Every 4 (Four) Hours As Needed for Wheezing. 18 g 0   • bisoprolol (ZEBeta) 5 MG tablet Take 1 tablet by mouth 2 (Two) Times a Day. 60 tablet 6   • cetirizine (zyrTEC) 10 MG tablet Take 1 tablet by mouth Daily. 30 tablet 11   • citalopram (CeleXA) 20 MG tablet TAKE ONE TABLET BY MOUTH EVERY NIGHT AT BEDTIME 90 tablet 1   • Droxidopa (Northera) 200 MG capsule Take 200 mg by mouth 3 (Three) Times a Day. NORTHERA. BRAND NAME ONLY. TAKE LAST DOSE  3 HOURS BEFORE BEDTIME, TAKE WITH 300MG TO EQUAL 500 MG 3 times daily 270 capsule 3   • droxidopa (Northera) 300 MG capsule capsule Take 1 capsule by mouth 3 (Three) Times a Day. NORTHERA BRAND NAME ONLY! TAKE LAST DOSE 3 HOURS BEFORE BEDTIME. TAKE WITH 200MG TO EQUAL 500 MG 3 times DAILY 270 capsule 3   • EPINEPHrine (EpiPen 2-Florencio) 0.3 MG/0.3ML solution auto-injector injection Inject 0.3 mL into the appropriate muscle as  directed by prescriber 1 (One) Time As Needed (anaphylaxis) for up to 1 dose. 2 each 0   • esomeprazole (nexIUM) 40 MG capsule Take 1 capsule by mouth Daily. 30 capsule 5   • fludrocortisone 0.1 MG tablet Take 2 tablets by mouth Daily With Breakfast. 60 tablet 6   • fluticasone (FLONASE) 50 MCG/ACT nasal spray Use 2 sprays in each nostril as directed by provider Daily. 16 g 3   • Fremanezumab-vfrm 225 MG/1.5ML solution auto-injector Inject 225 mg under the skin into the appropriate area as directed Every 28 (Twenty-Eight) Days. 1.5 mL 11   • ibuprofen (ADVIL,MOTRIN) 600 MG tablet Take 1 tablet by mouth Every 6 (Six) Hours As Needed for Mild Pain . 30 tablet 0   • IRON PO Take 1 tablet by mouth.     • ondansetron ODT (ZOFRAN-ODT) 4 MG disintegrating tablet Place 1 tablet on the tongue Every 6 (Six) Hours As Needed for Nausea or Vomiting. 30 tablet 0   • vitamin D (ERGOCALCIFEROL) 1.25 MG (25631 UT) capsule capsule Take 1 capsule by mouth 1 (One) Time Per Week. 12 capsule 0   • [DISCONTINUED] minocycline (MINOCIN,DYNACIN) 100 MG capsule Take 1 capsule by mouth Daily. 30 capsule 2   • [DISCONTINUED] NEXPLANON 68 MG implant subdermal implant      • [DISCONTINUED] riFAXIMin (Xifaxan) 550 MG tablet Take 1 tablet by mouth Every 8 (Eight) Hours. 42 tablet 0     No current facility-administered medications on file prior to visit.        Additional OB/GYN History   Last Pap :   Last Completed Pap Smear          Ordered - PAP SMEAR (Every 3 Years) Ordered on 6/10/2021    2021  SCANNED - PAP SMEAR    2019  Done - normal    2018  SCANNED - PAP SMEAR    2018  Done              Exercises Regularly: yes  Feelings of Anxiety or Depression: yes- does well with medication  Tobacco Usage?: No   OB History        2    Para   1    Term   1       0    AB   1    Living   1       SAB   0    IAB   0    Ectopic   1    Molar   0    Multiple   0    Live Births   1          Obstetric Comments   FOB #1-G1,2              The additional following portions of the patient's history were reviewed and updated as appropriate: allergies, current medications, past family history, past medical history, past social history, past surgical history and problem list.    Review of Systems   Constitutional: Negative.    HENT: Negative.    Eyes: Negative.    Respiratory: Negative.    Cardiovascular: Negative.    Gastrointestinal: Negative.    Endocrine: Negative.    Genitourinary: Positive for menstrual problem.   Musculoskeletal: Negative.    Skin: Negative.    Allergic/Immunologic: Negative.    Neurological: Negative.    Hematological: Negative.    Psychiatric/Behavioral: Negative.      All other systems reviewed and are negative.     I have reviewed and agree with the HPI, ROS, and historical information as entered above. Paola Call MD    Objective   /84   Wt (!) 147 kg (324 lb)   BMI 45.21 kg/m²     Physical Exam  Vitals and nursing note reviewed.   Constitutional:       Appearance: Normal appearance. She is well-developed.   HENT:      Head: Normocephalic and atraumatic.   Pulmonary:      Effort: Pulmonary effort is normal.      Breath sounds: Normal breath sounds.   Abdominal:      General: There is no distension.      Palpations: Abdomen is soft. Abdomen is not rigid. There is no mass.      Tenderness: There is no abdominal tenderness. There is no guarding or rebound.   Musculoskeletal:      Cervical back: Normal range of motion.   Skin:     General: Skin is warm and dry.   Neurological:      Mental Status: She is alert and oriented to person, place, and time.   Psychiatric:         Mood and Affect: Mood normal.         Behavior: Behavior normal.         Assessment/Plan     Assessment and Plan    Problem List Items Addressed This Visit    None     Visit Diagnoses     Family planning    -  Primary    Encounter for Nexplanon removal            We discussed weight loss and making sure that she is taking prenatal  vitamins.  1. Ovulatory cycles discussed with the patient.  She understands the concept of timed intercourse.  We also discussed the importance of prenatal vitamins and folic acid.  Reviewed that only 30% of people get pregnant by 3 months, 50% x 6 months, and 90% by year.  Should the patient attempt to conceive for greater than 1 year she should return to the clinic for evaluation.  2. Reassurance.  Advised patient to call with a positive urine pregnancy test.  3. Return PRN.  Return for Annual physical.      Paola Call MD  03/10/2022

## 2022-03-28 ENCOUNTER — SPECIALTY PHARMACY (OUTPATIENT)
Dept: ONCOLOGY | Facility: HOSPITAL | Age: 27
End: 2022-03-28

## 2022-03-28 DIAGNOSIS — K20.90 ESOPHAGITIS: Primary | ICD-10-CM

## 2022-03-28 RX ORDER — DEXLANSOPRAZOLE 60 MG/1
60 CAPSULE, DELAYED RELEASE ORAL DAILY
Qty: 30 CAPSULE | Refills: 0 | Status: SHIPPED | OUTPATIENT
Start: 2022-03-28 | End: 2022-04-25

## 2022-03-28 NOTE — PROGRESS NOTES
Specialty Pharmacy Refill Coordination Note     Ammon is a 26 y.o. female contacted today regarding refills of  Ajovy specialty medication(s).    Reviewed and verified with patient:         Specialty medication(s) and dose(s) confirmed: yes    Refill Questions    Flowsheet Row Most Recent Value   Changes to allergies? No   Changes to medications? No   New conditions since last clinic visit No   Unplanned office visit, urgent care, ED, or hospital admission in the last 4 weeks  No   How does patient/caregiver feel medication is working? Excellent   Financial problems or insurance changes  No   If yes, describe changes in insurance or financial issues. N/A   How many doses of your specialty medications were missed in the last 4 weeks? 0   Why were doses missed? N/A   Does this patient require a clinical escalation to a pharmacist? No          Delivery Questions    Flowsheet Row Most Recent Value   Delivery method FedEx   Delivery address correct? Yes   Preferred delivery time? Anytime   Number of medications in delivery 1   Medication being filled and delivered [facility]   Doses left of specialty medications 0   Is there any medication that is due not being filled? No   Supplies needed? No supplies needed   Cooler needed? Yes   Do any medications need mixed or dated? No   Copay form of payment Credit card on file   Questions or concerns for the pharmacist? No   Are any medications first time fills? No            Medication Adherence    Adherence tools used: calendar  Support network for adherence: healthcare provider          Follow-up: 28 day(s)     Carly Hopkins, Pharmacy Technician  Specialty Pharmacy Technician

## 2022-04-22 ENCOUNTER — SPECIALTY PHARMACY (OUTPATIENT)
Dept: ONCOLOGY | Facility: HOSPITAL | Age: 27
End: 2022-04-22

## 2022-04-22 DIAGNOSIS — K20.90 ESOPHAGITIS: ICD-10-CM

## 2022-04-22 RX ORDER — AMOXICILLIN AND CLAVULANATE POTASSIUM 875; 125 MG/1; MG/1
TABLET, FILM COATED ORAL
Qty: 14 TABLET | Refills: 0 | OUTPATIENT
Start: 2022-04-22

## 2022-04-22 NOTE — PROGRESS NOTES
Specialty Pharmacy Refill Coordination Note     Ammon is a 26 y.o. female contacted today regarding refills of  Ajovy specialty medication(s).    Reviewed and verified with patient:         Specialty medication(s) and dose(s) confirmed: yes    Refill Questions    Flowsheet Row Most Recent Value   Changes to allergies? No   Changes to medications? No   New conditions since last clinic visit No   Unplanned office visit, urgent care, ED, or hospital admission in the last 4 weeks  No   How does patient/caregiver feel medication is working? Excellent   Financial problems or insurance changes  No   If yes, describe changes in insurance or financial issues. N/A   How many doses of your specialty medications were missed in the last 4 weeks? 0   Why were doses missed? N/A   Does this patient require a clinical escalation to a pharmacist? No          Delivery Questions    Flowsheet Row Most Recent Value   Delivery method FedEx   Delivery address correct? Yes   Preferred delivery time? Anytime   Number of medications in delivery 1   Medication being filled and delivered [facility]   Doses left of specialty medications 2-Patient is waiting on this last dose then taking 3 at once for an 84 day supply   Is there any medication that is due not being filled? No   Supplies needed? No supplies needed   Cooler needed? Yes   Do any medications need mixed or dated? No   Copay form of payment Payment plan already set up   Questions or concerns for the pharmacist? No   Are any medications first time fills? No            Medication Adherence    Adherence tools used: calendar  Support network for adherence: healthcare provider          Follow-up: 28 day(s)     Carly Hopkins, Pharmacy Technician  Specialty Pharmacy Technician

## 2022-04-25 RX ORDER — DEXLANSOPRAZOLE 60 MG/1
CAPSULE, DELAYED RELEASE ORAL
Qty: 30 CAPSULE | Refills: 0 | Status: SHIPPED | OUTPATIENT
Start: 2022-04-25 | End: 2022-05-31

## 2022-05-23 ENCOUNTER — SPECIALTY PHARMACY (OUTPATIENT)
Dept: ONCOLOGY | Facility: HOSPITAL | Age: 27
End: 2022-05-23

## 2022-05-23 NOTE — PROGRESS NOTES
Specialty Pharmacy Refill Coordination Note     Ammon is a 26 y.o. female contacted today regarding refills of  Ajovy specialty medication(s). Patient is due for injection on 7/29. Patient took 4.5ml on 5/6.      Reviewed and verified with patient:         Specialty medication(s) and dose(s) confirmed: yes    Refill Questions    Flowsheet Row Most Recent Value   Changes to allergies? No   Changes to medications? No   New conditions since last clinic visit No   Unplanned office visit, urgent care, ED, or hospital admission in the last 4 weeks  No   How does patient/caregiver feel medication is working? Excellent   Financial problems or insurance changes  No   If yes, describe changes in insurance or financial issues. N/A   Since the previous refill, were any specialty medication doses or scheduled injections missed or delayed?  No   If yes, please provide the amount N/A   Why were doses missed? N/A   Does this patient require a clinical escalation to a pharmacist? No          Delivery Questions    Flowsheet Row Most Recent Value   Delivery method FedEx   Delivery address correct? Yes   Preferred delivery time? Anytime   Number of medications in delivery 1   Medication being filled and delivered [facility]   Doses left of specialty medications 0   Is there any medication that is due not being filled? No   Supplies needed? No supplies needed   Cooler needed? Yes   Do any medications need mixed or dated? No   Copay form of payment Credit card on file   Questions or concerns for the pharmacist? No   Are any medications first time fills? No            Medication Adherence    Adherence tools used: calendar  Support network for adherence: healthcare provider          Follow-up: 28 day(s)     Carly Hopkins, Pharmacy Technician  Specialty Pharmacy Technician

## 2022-05-28 DIAGNOSIS — K20.90 ESOPHAGITIS: ICD-10-CM

## 2022-05-31 RX ORDER — CETIRIZINE HYDROCHLORIDE 10 MG/1
TABLET ORAL
Qty: 30 TABLET | Refills: 0 | Status: SHIPPED | OUTPATIENT
Start: 2022-05-31 | End: 2022-09-08

## 2022-05-31 RX ORDER — DEXLANSOPRAZOLE 60 MG/1
CAPSULE, DELAYED RELEASE ORAL
Qty: 30 CAPSULE | Refills: 0 | Status: SHIPPED | OUTPATIENT
Start: 2022-05-31 | End: 2022-06-29

## 2022-06-14 ENCOUNTER — SPECIALTY PHARMACY (OUTPATIENT)
Dept: ONCOLOGY | Facility: HOSPITAL | Age: 27
End: 2022-06-14

## 2022-06-14 NOTE — PROGRESS NOTES
Specialty Pharmacy Refill Coordination Note     Ammon is a 27 y.o. female contacted today regarding refills of Ajovy specialty medication(s). Second dose shipped. Will need third dose at next fill in Mid July. Patient due to take 4.5mL on 7/29/2022.    Reviewed and verified with patient: Yes  Specialty medication(s) and dose(s) confirmed: yes    Refill Questions    Flowsheet Row Most Recent Value   Changes to allergies? No   Changes to medications? No   New conditions since last clinic visit No   Unplanned office visit, urgent care, ED, or hospital admission in the last 4 weeks  No   How does patient/caregiver feel medication is working? Excellent   Financial problems or insurance changes  No   If yes, describe changes in insurance or financial issues. N/A   Since the previous refill, were any specialty medication doses or scheduled injections missed or delayed?  No   If yes, please provide the amount N/A   Why were doses missed? N/A   Does this patient require a clinical escalation to a pharmacist? No          Delivery Questions    Flowsheet Row Most Recent Value   Delivery method FedEx   Delivery address correct? Yes   Preferred delivery time? Anytime   Number of medications in delivery 1   Medication being filled and delivered [facility]   Doses left of specialty medications 1. Patient will receive this month and one other dose before taking 3 at the end of July.   Is there any medication that is due not being filled? No   Supplies needed? No supplies needed   Cooler needed? Yes   Do any medications need mixed or dated? No   Copay form of payment Credit card on file   Questions or concerns for the pharmacist? No   Are any medications first time fills? No            Medication Adherence    Adherence tools used: calendar  Support network for adherence: healthcare provider          Follow-up: 28 days.     Susan Osman, Pharmacy Technician  Specialty Pharmacy Technician

## 2022-06-29 DIAGNOSIS — K20.90 ESOPHAGITIS: ICD-10-CM

## 2022-06-29 RX ORDER — BISOPROLOL FUMARATE 5 MG/1
TABLET, FILM COATED ORAL
Qty: 60 TABLET | Refills: 6 | Status: SHIPPED | OUTPATIENT
Start: 2022-06-29 | End: 2023-03-22 | Stop reason: HOSPADM

## 2022-06-29 RX ORDER — CETIRIZINE HYDROCHLORIDE 10 MG/1
TABLET ORAL
Qty: 30 TABLET | Refills: 0 | OUTPATIENT
Start: 2022-06-29

## 2022-06-29 RX ORDER — FLUDROCORTISONE ACETATE 0.1 MG/1
TABLET ORAL
Qty: 60 TABLET | Refills: 6 | Status: SHIPPED | OUTPATIENT
Start: 2022-06-29 | End: 2023-03-22 | Stop reason: HOSPADM

## 2022-06-29 RX ORDER — DEXLANSOPRAZOLE 60 MG/1
CAPSULE, DELAYED RELEASE ORAL
Qty: 30 CAPSULE | Refills: 0 | Status: SHIPPED | OUTPATIENT
Start: 2022-06-29 | End: 2022-08-05

## 2022-06-29 NOTE — TELEPHONE ENCOUNTER
Rx Refill Note  Requested Prescriptions     Pending Prescriptions Disp Refills   • cetirizine (zyrTEC) 10 MG tablet [Pharmacy Med Name: CETIRIZINE HCL 10 MG TABLET] 30 tablet 0     Sig: TAKE ONE TABLET BY MOUTH DAILY      Last office visit with prescribing clinician: 1/18/2022      Next office visit with prescribing clinician: Visit date not found            Epi Grijalva MA  06/29/22, 11:52 EDT

## 2022-07-01 ENCOUNTER — PRIOR AUTHORIZATION (OUTPATIENT)
Dept: GASTROENTEROLOGY | Facility: CLINIC | Age: 27
End: 2022-07-01

## 2022-07-21 ENCOUNTER — TELEPHONE (OUTPATIENT)
Dept: GASTROENTEROLOGY | Facility: CLINIC | Age: 27
End: 2022-07-21

## 2022-07-21 ENCOUNTER — TELEPHONE (OUTPATIENT)
Dept: NEUROLOGY | Facility: CLINIC | Age: 27
End: 2022-07-21

## 2022-07-21 ENCOUNTER — SPECIALTY PHARMACY (OUTPATIENT)
Dept: ONCOLOGY | Facility: HOSPITAL | Age: 27
End: 2022-07-21

## 2022-07-21 NOTE — TELEPHONE ENCOUNTER
,   called she just found out she is pregnant. She is currently taking dexilant she would like to know if this is safe for her to take while pregnant. Please advise?

## 2022-07-21 NOTE — TELEPHONE ENCOUNTER
Provider: SERGE  Caller: PATIENT  Relationship to Patient: SELF  Pharmacy: N/A  Phone Number: [phone]  Reason for Call:  PATIENT TELEPHONED; SHE JUST FOUND OUT SHE IS PREGNANT & IS DUE TO TAKE HER QUARTERLY INJECTION FOR FREMANEZUMAB-VFRM 225 MG/1.5 ML SOLUTION AUTO-INJECTOR.    PATIENT IS WANTING TO KNOW IF IT IS O.K. TO TAKE.    PLEASE ADVISE.    THANK YOU.

## 2022-07-21 NOTE — TELEPHONE ENCOUNTER
Called Ammon and relayed Mark's response. She is at work right now and a customer just came in so she is going to call me back.

## 2022-07-22 ENCOUNTER — TELEPHONE (OUTPATIENT)
Dept: CARDIOLOGY | Facility: CLINIC | Age: 27
End: 2022-07-22

## 2022-07-22 NOTE — TELEPHONE ENCOUNTER
"Ammon called.  She states she is about 6 weeks pregnant.  She states that the last pregnancy you stated her on infusions due to her POTS with syncope.  She wants to know if she can start that again before it gets \"as bad as last time\"     Please advise.   "

## 2022-07-26 DIAGNOSIS — R10.30 PREGNANCY RELATED BILATERAL LOWER ABDOMINAL PAIN, ANTEPARTUM: Primary | ICD-10-CM

## 2022-07-26 DIAGNOSIS — G90.A POTS (POSTURAL ORTHOSTATIC TACHYCARDIA SYNDROME): ICD-10-CM

## 2022-07-26 DIAGNOSIS — O26.899 PREGNANCY RELATED BILATERAL LOWER ABDOMINAL PAIN, ANTEPARTUM: Primary | ICD-10-CM

## 2022-07-26 NOTE — PROGRESS NOTES
Ok per Dr. Moran to restart iinfusion as before.    Patient has old treatment plan, unsure how to order this within Epic, she in now pregnant again and it need reactivated.   sodium chloride 0.9 % infusion 1,500 mL PRN  PRN     1,500 mL, Intravenous, As Needed, during pregnancy, Starting at treatment start time, Until Discontinued  Administer 1000 ml to 1500 ml weekly as needed during pregnancy

## 2022-07-29 DIAGNOSIS — Z36.89 ESTABLISH GESTATIONAL AGE, ULTRASOUND: Primary | ICD-10-CM

## 2022-08-05 DIAGNOSIS — K20.90 ESOPHAGITIS: ICD-10-CM

## 2022-08-05 RX ORDER — DEXLANSOPRAZOLE 60 MG/1
CAPSULE, DELAYED RELEASE ORAL
Qty: 30 CAPSULE | Refills: 0 | Status: SHIPPED | OUTPATIENT
Start: 2022-08-05 | End: 2022-09-07

## 2022-08-08 ENCOUNTER — TELEPHONE (OUTPATIENT)
Dept: CARDIOLOGY | Facility: CLINIC | Age: 27
End: 2022-08-08

## 2022-08-08 PROBLEM — Z80.3 FAMILY HISTORY OF BREAST CANCER: Status: RESOLVED | Noted: 2021-06-03 | Resolved: 2022-08-08

## 2022-08-08 PROBLEM — Z34.90 PREGNANCY: Status: ACTIVE | Noted: 2022-08-08

## 2022-08-08 NOTE — TELEPHONE ENCOUNTER
Tanya Nguyen, FRIEDA  You 48 minutes ago (12:21 PM)         Can she clarify which cardiac medications she is taking bc we have not seen her in a while ? Just let me know and I will check.

## 2022-08-08 NOTE — TELEPHONE ENCOUNTER
Dexlansoprazole 60mg - [facility]   Fludrocortison 0.1mg - [facility]   Bispoprolol 5mg - [facility]   Northera 200mg - [facility]  Northera 300mg - [facility]  Certirizine 10mg - [facility]  Citaloptam 20mg - [facility]      All my meds currently prescribed and doctor who prescribes them

## 2022-08-08 NOTE — TELEPHONE ENCOUNTER
----- Message from Ammon Vargas sent at 8/8/2022 10:18 AM EDT -----  Regarding: Medications while pregnant   I need to know which medications are safe for me to take during my pregnancy and which ones aren’t I know the Northera I can but I don’t remember which other ones were okay to take.   Thanks

## 2022-08-09 ENCOUNTER — INITIAL PRENATAL (OUTPATIENT)
Dept: OBSTETRICS AND GYNECOLOGY | Facility: CLINIC | Age: 27
End: 2022-08-09

## 2022-08-09 VITALS — DIASTOLIC BLOOD PRESSURE: 82 MMHG | BODY MASS INDEX: 44.79 KG/M2 | WEIGHT: 293 LBS | SYSTOLIC BLOOD PRESSURE: 130 MMHG

## 2022-08-09 DIAGNOSIS — Z3A.01 LESS THAN 8 WEEKS GESTATION OF PREGNANCY: Primary | ICD-10-CM

## 2022-08-09 DIAGNOSIS — M79.7 FIBROMYALGIA: ICD-10-CM

## 2022-08-09 DIAGNOSIS — O99.210 OBESITY IN PREGNANCY, ANTEPARTUM: ICD-10-CM

## 2022-08-09 DIAGNOSIS — Z11.3 SCREENING FOR STD (SEXUALLY TRANSMITTED DISEASE): ICD-10-CM

## 2022-08-09 DIAGNOSIS — G90.A POTS (POSTURAL ORTHOSTATIC TACHYCARDIA SYNDROME): Chronic | ICD-10-CM

## 2022-08-09 DIAGNOSIS — G90.A POTS (POSTURAL ORTHOSTATIC TACHYCARDIA SYNDROME): Primary | ICD-10-CM

## 2022-08-09 PROCEDURE — 0501F PRENATAL FLOW SHEET: CPT | Performed by: OBSTETRICS & GYNECOLOGY

## 2022-08-09 RX ORDER — PRENATAL VIT NO.126/IRON/FOLIC 28MG-0.8MG
TABLET ORAL DAILY
COMMUNITY
End: 2023-03-22

## 2022-08-09 RX ORDER — ACETAMINOPHEN 500 MG
500 TABLET ORAL EVERY 6 HOURS PRN
COMMUNITY
End: 2022-08-28

## 2022-08-09 NOTE — TELEPHONE ENCOUNTER
Referral made to Dr. Manley with [facility]. Just want to clarify the bisoprolol instructions. Does she need to take it?

## 2022-08-09 NOTE — TELEPHONE ENCOUNTER
Tanya Nguyen, FRIEDA  You 1 hour ago (3:15 PM)         If she is off bystolic does she have episodes of the tachycardia and symptomatic? If so she needs to stay on her bystolic

## 2022-08-09 NOTE — TELEPHONE ENCOUNTER
Tanya Nguyen, FRIEDA  You 22 minutes ago (1:02 PM)         Dr Moran said he wants her to see [facility] as soon as possible. If she needs to Bisoprolol, she needs to stay on it otherwise she may come off for first trimester then start back.

## 2022-08-10 ENCOUNTER — TELEPHONE (OUTPATIENT)
Dept: NEUROLOGY | Facility: CLINIC | Age: 27
End: 2022-08-10

## 2022-08-10 NOTE — TELEPHONE ENCOUNTER
Called patient and left vm that provider said she does not need to follow up.    Regarding: Appointment   Does not need to follow up     ----- Message -----  From: Lisa King MA  Sent: 8/10/2022   8:21 AM EDT  To: rAie Wharton MD  Subject: Appointment                                      ----- Message from Lisa King MA sent at 8/10/2022  8:21 AM EDT -----       ----- Message from Ammon Vargas to Arie Wharton MD sent at 8/9/2022  4:40 PM -----   Will I still need to come in next week since I am no longer taking the Ajovy injection?

## 2022-08-11 LAB
ABO GROUP BLD: ABNORMAL
ALBUMIN SERPL-MCNC: 4.1 G/DL (ref 3.9–5)
ALBUMIN/GLOB SERPL: 1.4 {RATIO} (ref 1.2–2.2)
ALP SERPL-CCNC: 110 IU/L (ref 44–121)
ALT SERPL-CCNC: 15 IU/L (ref 0–32)
AMPHETAMINES UR QL SCN: NEGATIVE NG/ML
APPEARANCE UR: CLEAR
AST SERPL-CCNC: 13 IU/L (ref 0–40)
BACTERIA #/AREA URNS HPF: NORMAL /[HPF]
BACTERIA UR CULT: NORMAL
BACTERIA UR CULT: NORMAL
BARBITURATES UR QL SCN: NEGATIVE NG/ML
BASOPHILS # BLD AUTO: 0.1 X10E3/UL (ref 0–0.2)
BASOPHILS NFR BLD AUTO: 1 %
BENZODIAZ UR QL SCN: NEGATIVE NG/ML
BILIRUB SERPL-MCNC: 0.3 MG/DL (ref 0–1.2)
BILIRUB UR QL STRIP: NEGATIVE
BLD GP AB SCN SERPL QL: NEGATIVE
BUN SERPL-MCNC: 6 MG/DL (ref 6–20)
BUN/CREAT SERPL: 11 (ref 9–23)
BZE UR QL SCN: NEGATIVE NG/ML
C TRACH RRNA SPEC QL NAA+PROBE: NEGATIVE
CALCIUM SERPL-MCNC: 9.2 MG/DL (ref 8.7–10.2)
CANNABINOIDS UR QL SCN: NEGATIVE NG/ML
CASTS URNS QL MICRO: NORMAL /LPF
CHLORIDE SERPL-SCNC: 102 MMOL/L (ref 96–106)
CO2 SERPL-SCNC: 21 MMOL/L (ref 20–29)
COLOR UR: YELLOW
CREAT SERPL-MCNC: 0.54 MG/DL (ref 0.57–1)
CREAT UR-MCNC: 192 MG/DL (ref 20–300)
EGFRCR SERPLBLD CKD-EPI 2021: 129 ML/MIN/1.73
EOSINOPHIL # BLD AUTO: 0.2 X10E3/UL (ref 0–0.4)
EOSINOPHIL NFR BLD AUTO: 2 %
EPI CELLS #/AREA URNS HPF: NORMAL /HPF (ref 0–10)
ERYTHROCYTE [DISTWIDTH] IN BLOOD BY AUTOMATED COUNT: 15.3 % (ref 11.7–15.4)
GLOBULIN SER CALC-MCNC: 2.9 G/DL (ref 1.5–4.5)
GLUCOSE SERPL-MCNC: 92 MG/DL (ref 65–99)
GLUCOSE UR QL STRIP: NEGATIVE
HBA1C MFR BLD: 5.5 % (ref 4.8–5.6)
HBV SURFACE AG SERPL QL IA: NEGATIVE
HCT VFR BLD AUTO: 34.5 % (ref 34–46.6)
HCV AB S/CO SERPL IA: 0.3 S/CO RATIO (ref 0–0.9)
HGB BLD-MCNC: 11.2 G/DL (ref 11.1–15.9)
HGB UR QL STRIP: NEGATIVE
HIV 1+2 AB+HIV1 P24 AG SERPL QL IA: NON REACTIVE
IMM GRANULOCYTES # BLD AUTO: 0 X10E3/UL (ref 0–0.1)
IMM GRANULOCYTES NFR BLD AUTO: 0 %
KETONES UR QL STRIP: NEGATIVE
LABORATORY COMMENT REPORT: NORMAL
LEUKOCYTE ESTERASE UR QL STRIP: ABNORMAL
LYMPHOCYTES # BLD AUTO: 1.9 X10E3/UL (ref 0.7–3.1)
LYMPHOCYTES NFR BLD AUTO: 18 %
MCH RBC QN AUTO: 24.1 PG (ref 26.6–33)
MCHC RBC AUTO-ENTMCNC: 32.5 G/DL (ref 31.5–35.7)
MCV RBC AUTO: 74 FL (ref 79–97)
METHADONE UR QL SCN: NEGATIVE NG/ML
MICRO URNS: ABNORMAL
MONOCYTES # BLD AUTO: 0.6 X10E3/UL (ref 0.1–0.9)
MONOCYTES NFR BLD AUTO: 6 %
N GONORRHOEA RRNA SPEC QL NAA+PROBE: NEGATIVE
NEUTROPHILS # BLD AUTO: 7.7 X10E3/UL (ref 1.4–7)
NEUTROPHILS NFR BLD AUTO: 73 %
NITRITE UR QL STRIP: NEGATIVE
OPIATES UR QL SCN: NEGATIVE NG/ML
OXYCODONE+OXYMORPHONE UR QL SCN: NEGATIVE NG/ML
PCP UR QL: NEGATIVE NG/ML
PH UR STRIP: 6.5 [PH] (ref 5–7.5)
PH UR: 6.3 [PH] (ref 4.5–8.9)
PLATELET # BLD AUTO: 450 X10E3/UL (ref 150–450)
POTASSIUM SERPL-SCNC: 4.4 MMOL/L (ref 3.5–5.2)
PROPOXYPH UR QL SCN: NEGATIVE NG/ML
PROT SERPL-MCNC: 7 G/DL (ref 6–8.5)
PROT UR QL STRIP: ABNORMAL
RBC # BLD AUTO: 4.65 X10E6/UL (ref 3.77–5.28)
RBC #/AREA URNS HPF: NORMAL /HPF (ref 0–2)
RH BLD: POSITIVE
RPR SER QL: NON REACTIVE
RUBV IGG SERPL IA-ACNC: 1.75 INDEX
SODIUM SERPL-SCNC: 137 MMOL/L (ref 134–144)
SP GR UR STRIP: 1.02 (ref 1–1.03)
UROBILINOGEN UR STRIP-MCNC: 0.2 MG/DL (ref 0.2–1)
WBC # BLD AUTO: 10.4 X10E3/UL (ref 3.4–10.8)
WBC #/AREA URNS HPF: NORMAL /HPF (ref 0–5)

## 2022-08-25 ENCOUNTER — PATIENT MESSAGE (OUTPATIENT)
Dept: CARDIOLOGY | Facility: CLINIC | Age: 27
End: 2022-08-25

## 2022-08-26 NOTE — TELEPHONE ENCOUNTER
I called patient and spoke with her, she states she did not go to the ER. I advised her to go to the ER to be evaluated. She voiced understanding. I advised her she would receive a call back if any changes needed to be made.

## 2022-08-26 NOTE — TELEPHONE ENCOUNTER
Dr. Moran's previous notes indicate that she should not take Bisoprolol until second trimester. She probably needs a different beta blocker, such as Labetalol. I agree that she should go to the ER.

## 2022-08-28 ENCOUNTER — HOSPITAL ENCOUNTER (OUTPATIENT)
Facility: HOSPITAL | Age: 27
LOS: 1 days | Discharge: HOME | End: 2022-08-29
Attending: EMERGENCY MEDICINE | Admitting: OBSTETRICS & GYNECOLOGY

## 2022-08-28 ENCOUNTER — TELEPHONE (OUTPATIENT)
Dept: OBSTETRICS AND GYNECOLOGY | Facility: CLINIC | Age: 27
End: 2022-08-28

## 2022-08-28 DIAGNOSIS — O21.0 HYPEREMESIS GRAVIDARUM: Primary | ICD-10-CM

## 2022-08-28 PROBLEM — F32.A DEPRESSION AFFECTING PREGNANCY, ANTEPARTUM: Status: ACTIVE | Noted: 2022-08-28

## 2022-08-28 PROBLEM — K64.8 INTERNAL HEMORRHOIDS: Status: RESOLVED | Noted: 2021-02-22 | Resolved: 2022-08-28

## 2022-08-28 PROBLEM — K20.90 ESOPHAGITIS: Status: RESOLVED | Noted: 2021-02-22 | Resolved: 2022-08-28

## 2022-08-28 PROBLEM — O99.340 DEPRESSION AFFECTING PREGNANCY, ANTEPARTUM: Status: ACTIVE | Noted: 2022-08-28

## 2022-08-28 PROBLEM — Z34.80 PRENATAL CARE, SUBSEQUENT PREGNANCY: Status: ACTIVE | Noted: 2022-08-08

## 2022-08-28 PROBLEM — O26.899 PREGNANCY RELATED BILATERAL LOWER ABDOMINAL PAIN, ANTEPARTUM: Status: RESOLVED | Noted: 2019-06-14 | Resolved: 2022-08-28

## 2022-08-28 PROBLEM — R10.30 PREGNANCY RELATED BILATERAL LOWER ABDOMINAL PAIN, ANTEPARTUM: Status: RESOLVED | Noted: 2019-06-14 | Resolved: 2022-08-28

## 2022-08-28 PROBLEM — O99.019 MATERNAL ANEMIA IN PREGNANCY, ANTEPARTUM: Status: ACTIVE | Noted: 2022-08-28

## 2022-08-28 PROBLEM — G43.909 MIGRAINES: Status: RESOLVED | Noted: 2020-02-07 | Resolved: 2022-08-28

## 2022-08-28 PROBLEM — O21.1 HYPEREMESIS GRAVIDARUM WITH METABOLIC DISTURBANCE, ANTEPARTUM: Status: ACTIVE | Noted: 2022-08-28

## 2022-08-28 LAB
ALBUMIN SERPL-MCNC: 3.8 G/DL (ref 3.5–5.2)
ALBUMIN/GLOB SERPL: 1.1 G/DL
ALP SERPL-CCNC: 106 U/L (ref 39–117)
ALT SERPL W P-5'-P-CCNC: 21 U/L (ref 1–33)
ANION GAP SERPL CALCULATED.3IONS-SCNC: 11 MMOL/L (ref 5–15)
AST SERPL-CCNC: 25 U/L (ref 1–32)
BACTERIA UR QL AUTO: ABNORMAL /HPF
BASOPHILS # BLD AUTO: 0.02 10*3/MM3 (ref 0–0.2)
BASOPHILS NFR BLD AUTO: 0.3 % (ref 0–1.5)
BILIRUB SERPL-MCNC: 0.3 MG/DL (ref 0–1.2)
BILIRUB UR QL STRIP: NEGATIVE
BUN SERPL-MCNC: 4 MG/DL (ref 6–20)
BUN/CREAT SERPL: 6.8 (ref 7–25)
CALCIUM SPEC-SCNC: 9.1 MG/DL (ref 8.6–10.5)
CHLORIDE SERPL-SCNC: 100 MMOL/L (ref 98–107)
CLARITY UR: ABNORMAL
CO2 SERPL-SCNC: 19 MMOL/L (ref 22–29)
COLOR UR: YELLOW
CREAT SERPL-MCNC: 0.59 MG/DL (ref 0.57–1)
DEPRECATED RDW RBC AUTO: 42.3 FL (ref 37–54)
EGFRCR SERPLBLD CKD-EPI 2021: 126.9 ML/MIN/1.73
EOSINOPHIL # BLD AUTO: 0.11 10*3/MM3 (ref 0–0.4)
EOSINOPHIL NFR BLD AUTO: 1.5 % (ref 0.3–6.2)
ERYTHROCYTE [DISTWIDTH] IN BLOOD BY AUTOMATED COUNT: 15.2 % (ref 12.3–15.4)
GLOBULIN UR ELPH-MCNC: 3.6 GM/DL
GLUCOSE SERPL-MCNC: 112 MG/DL (ref 65–99)
GLUCOSE UR STRIP-MCNC: NEGATIVE MG/DL
HCT VFR BLD AUTO: 38.5 % (ref 34–46.6)
HGB BLD-MCNC: 11.9 G/DL (ref 12–15.9)
HGB UR QL STRIP.AUTO: NEGATIVE
HOLD SPECIMEN: NORMAL
HYALINE CASTS UR QL AUTO: ABNORMAL /LPF
IMM GRANULOCYTES # BLD AUTO: 0.02 10*3/MM3 (ref 0–0.05)
IMM GRANULOCYTES NFR BLD AUTO: 0.3 % (ref 0–0.5)
KETONES UR QL STRIP: ABNORMAL
LEUKOCYTE ESTERASE UR QL STRIP.AUTO: ABNORMAL
LIPASE SERPL-CCNC: 16 U/L (ref 13–60)
LYMPHOCYTES # BLD AUTO: 1.02 10*3/MM3 (ref 0.7–3.1)
LYMPHOCYTES NFR BLD AUTO: 13.7 % (ref 19.6–45.3)
MCH RBC QN AUTO: 24 PG (ref 26.6–33)
MCHC RBC AUTO-ENTMCNC: 30.9 G/DL (ref 31.5–35.7)
MCV RBC AUTO: 77.6 FL (ref 79–97)
MONOCYTES # BLD AUTO: 0.5 10*3/MM3 (ref 0.1–0.9)
MONOCYTES NFR BLD AUTO: 6.7 % (ref 5–12)
NEUTROPHILS NFR BLD AUTO: 5.77 10*3/MM3 (ref 1.7–7)
NEUTROPHILS NFR BLD AUTO: 77.5 % (ref 42.7–76)
NITRITE UR QL STRIP: NEGATIVE
NRBC BLD AUTO-RTO: 0 /100 WBC (ref 0–0.2)
PH UR STRIP.AUTO: 6 [PH] (ref 5–8)
PLATELET # BLD AUTO: 387 10*3/MM3 (ref 140–450)
PMV BLD AUTO: 9.5 FL (ref 6–12)
POTASSIUM SERPL-SCNC: 3.8 MMOL/L (ref 3.5–5.2)
PROT SERPL-MCNC: 7.4 G/DL (ref 6–8.5)
PROT UR QL STRIP: ABNORMAL
RBC # BLD AUTO: 4.96 10*6/MM3 (ref 3.77–5.28)
RBC # UR STRIP: ABNORMAL /HPF
REF LAB TEST METHOD: ABNORMAL
SODIUM SERPL-SCNC: 130 MMOL/L (ref 136–145)
SP GR UR STRIP: 1.02 (ref 1–1.03)
SQUAMOUS #/AREA URNS HPF: ABNORMAL /HPF
UROBILINOGEN UR QL STRIP: ABNORMAL
WBC # UR STRIP: ABNORMAL /HPF
WBC NRBC COR # BLD: 7.44 10*3/MM3 (ref 3.4–10.8)
WHOLE BLOOD HOLD COAG: NORMAL
WHOLE BLOOD HOLD SPECIMEN: NORMAL

## 2022-08-28 PROCEDURE — 99283 EMERGENCY DEPT VISIT LOW MDM: CPT

## 2022-08-28 PROCEDURE — 25010000002 PROCHLORPERAZINE 10 MG/2ML SOLUTION: Performed by: OBSTETRICS & GYNECOLOGY

## 2022-08-28 PROCEDURE — 25010000002 ONDANSETRON PER 1 MG: Performed by: NURSE PRACTITIONER

## 2022-08-28 PROCEDURE — 87086 URINE CULTURE/COLONY COUNT: CPT

## 2022-08-28 PROCEDURE — 96375 TX/PRO/DX INJ NEW DRUG ADDON: CPT

## 2022-08-28 PROCEDURE — 25010000002 PROMETHAZINE PER 50 MG: Performed by: NURSE PRACTITIONER

## 2022-08-28 PROCEDURE — G0378 HOSPITAL OBSERVATION PER HR: HCPCS

## 2022-08-28 PROCEDURE — 99219 PR INITIAL OBSERVATION CARE/DAY 50 MINUTES: CPT | Performed by: OBSTETRICS & GYNECOLOGY

## 2022-08-28 PROCEDURE — 80053 COMPREHEN METABOLIC PANEL: CPT | Performed by: EMERGENCY MEDICINE

## 2022-08-28 PROCEDURE — 99284 EMERGENCY DEPT VISIT MOD MDM: CPT

## 2022-08-28 PROCEDURE — 83690 ASSAY OF LIPASE: CPT | Performed by: EMERGENCY MEDICINE

## 2022-08-28 PROCEDURE — 96374 THER/PROPH/DIAG INJ IV PUSH: CPT

## 2022-08-28 PROCEDURE — 85025 COMPLETE CBC W/AUTO DIFF WBC: CPT | Performed by: EMERGENCY MEDICINE

## 2022-08-28 PROCEDURE — 96361 HYDRATE IV INFUSION ADD-ON: CPT

## 2022-08-28 PROCEDURE — 81001 URINALYSIS AUTO W/SCOPE: CPT | Performed by: EMERGENCY MEDICINE

## 2022-08-28 RX ORDER — LANOLIN ALCOHOL/MO/W.PET/CERES
50 CREAM (GRAM) TOPICAL DAILY
Status: DISCONTINUED | OUTPATIENT
Start: 2022-08-28 | End: 2022-08-29 | Stop reason: HOSPADM

## 2022-08-28 RX ORDER — SODIUM CHLORIDE 9 MG/ML
10 INJECTION INTRAVENOUS AS NEEDED
Status: DISCONTINUED | OUTPATIENT
Start: 2022-08-28 | End: 2022-08-29 | Stop reason: HOSPADM

## 2022-08-28 RX ORDER — SODIUM CHLORIDE 0.9 % (FLUSH) 0.9 %
10 SYRINGE (ML) INJECTION AS NEEDED
Status: DISCONTINUED | OUTPATIENT
Start: 2022-08-28 | End: 2022-08-29 | Stop reason: HOSPADM

## 2022-08-28 RX ORDER — PANTOPRAZOLE SODIUM 40 MG/1
40 TABLET, DELAYED RELEASE ORAL
Status: DISCONTINUED | OUTPATIENT
Start: 2022-08-29 | End: 2022-08-29 | Stop reason: HOSPADM

## 2022-08-28 RX ORDER — FLUDROCORTISONE ACETATE 0.1 MG/1
200 TABLET ORAL
Status: DISCONTINUED | OUTPATIENT
Start: 2022-08-29 | End: 2022-08-29 | Stop reason: HOSPADM

## 2022-08-28 RX ORDER — ACETAMINOPHEN 325 MG/1
650 TABLET ORAL EVERY 4 HOURS PRN
Status: DISCONTINUED | OUTPATIENT
Start: 2022-08-28 | End: 2022-08-29 | Stop reason: HOSPADM

## 2022-08-28 RX ORDER — POLYETHYLENE GLYCOL 3350 17 G/17G
17 POWDER, FOR SOLUTION ORAL 2 TIMES DAILY PRN
Status: DISCONTINUED | OUTPATIENT
Start: 2022-08-28 | End: 2022-08-29 | Stop reason: HOSPADM

## 2022-08-28 RX ORDER — PROCHLORPERAZINE EDISYLATE 5 MG/ML
10 INJECTION INTRAMUSCULAR; INTRAVENOUS EVERY 8 HOURS PRN
Status: DISCONTINUED | OUTPATIENT
Start: 2022-08-28 | End: 2022-08-29 | Stop reason: HOSPADM

## 2022-08-28 RX ORDER — CITALOPRAM 20 MG/1
20 TABLET ORAL DAILY
Status: DISCONTINUED | OUTPATIENT
Start: 2022-08-28 | End: 2022-08-29 | Stop reason: HOSPADM

## 2022-08-28 RX ORDER — ONDANSETRON 2 MG/ML
4 INJECTION INTRAMUSCULAR; INTRAVENOUS ONCE
Status: COMPLETED | OUTPATIENT
Start: 2022-08-28 | End: 2022-08-28

## 2022-08-28 RX ORDER — BISOPROLOL FUMARATE 5 MG/1
5 TABLET, FILM COATED ORAL 2 TIMES DAILY
Status: DISCONTINUED | OUTPATIENT
Start: 2022-08-28 | End: 2022-08-29 | Stop reason: HOSPADM

## 2022-08-28 RX ORDER — DEXTROSE, SODIUM CHLORIDE, AND POTASSIUM CHLORIDE 5; .45; .15 G/100ML; G/100ML; G/100ML
125 INJECTION INTRAVENOUS CONTINUOUS
Status: DISCONTINUED | OUTPATIENT
Start: 2022-08-28 | End: 2022-08-29 | Stop reason: HOSPADM

## 2022-08-28 RX ORDER — FAMOTIDINE 10 MG/ML
20 INJECTION, SOLUTION INTRAVENOUS EVERY 12 HOURS SCHEDULED
Status: DISCONTINUED | OUTPATIENT
Start: 2022-08-28 | End: 2022-08-29 | Stop reason: HOSPADM

## 2022-08-28 RX ORDER — DROXIDOPA 300 MG/1
300 CAPSULE ORAL 3 TIMES DAILY
Refills: 3 | Status: DISCONTINUED | OUTPATIENT
Start: 2022-08-28 | End: 2022-08-29 | Stop reason: HOSPADM

## 2022-08-28 RX ORDER — DROXIDOPA 200 MG/1
200 CAPSULE ORAL 3 TIMES DAILY
Refills: 3 | Status: DISCONTINUED | OUTPATIENT
Start: 2022-08-28 | End: 2022-08-29 | Stop reason: HOSPADM

## 2022-08-28 RX ADMIN — ONDANSETRON 4 MG: 2 INJECTION INTRAMUSCULAR; INTRAVENOUS at 12:45

## 2022-08-28 RX ADMIN — PROMETHAZINE HYDROCHLORIDE 12.5 MG: 25 INJECTION INTRAMUSCULAR; INTRAVENOUS at 13:36

## 2022-08-28 RX ADMIN — PROCHLORPERAZINE EDISYLATE 10 MG: 5 INJECTION INTRAMUSCULAR; INTRAVENOUS at 20:40

## 2022-08-28 RX ADMIN — SODIUM CHLORIDE, POTASSIUM CHLORIDE, SODIUM LACTATE AND CALCIUM CHLORIDE 1000 ML: 600; 310; 30; 20 INJECTION, SOLUTION INTRAVENOUS at 16:09

## 2022-08-28 RX ADMIN — SODIUM CHLORIDE 1000 ML: 9 INJECTION, SOLUTION INTRAVENOUS at 12:00

## 2022-08-28 RX ADMIN — CITALOPRAM HYDROBROMIDE 20 MG: 20 TABLET ORAL at 20:32

## 2022-08-28 RX ADMIN — POTASSIUM CHLORIDE, DEXTROSE MONOHYDRATE AND SODIUM CHLORIDE 125 ML/HR: 150; 5; 450 INJECTION, SOLUTION INTRAVENOUS at 20:34

## 2022-08-28 RX ADMIN — PYRIDOXINE HCL TAB 50 MG 50 MG: 50 TAB at 12:00

## 2022-08-28 RX ADMIN — ACETAMINOPHEN 650 MG: 325 TABLET, FILM COATED ORAL at 20:32

## 2022-08-28 RX ADMIN — FAMOTIDINE 20 MG: 10 INJECTION, SOLUTION INTRAVENOUS at 20:32

## 2022-08-28 NOTE — TELEPHONE ENCOUNTER
Pt. Calls this am says she is 10+ weeks pregnant and unable to keep any fluids or food down past few days. Now having vomiting and diarrhea. Says she tried phenergan without relief yesterday. This morning feels much worse and is concerned about dehydration.     Plan: Will go to ED for evaluation and IV fluids if indicated.

## 2022-08-29 ENCOUNTER — READMISSION MANAGEMENT (OUTPATIENT)
Dept: CALL CENTER | Facility: HOSPITAL | Age: 27
End: 2022-08-29

## 2022-08-29 ENCOUNTER — TELEPHONE (OUTPATIENT)
Dept: OBSTETRICS AND GYNECOLOGY | Facility: CLINIC | Age: 27
End: 2022-08-29

## 2022-08-29 VITALS
WEIGHT: 293 LBS | BODY MASS INDEX: 39.68 KG/M2 | HEIGHT: 72 IN | HEART RATE: 89 BPM | TEMPERATURE: 97.7 F | RESPIRATION RATE: 16 BRPM | DIASTOLIC BLOOD PRESSURE: 59 MMHG | SYSTOLIC BLOOD PRESSURE: 99 MMHG | OXYGEN SATURATION: 98 %

## 2022-08-29 PROBLEM — O21.0 HYPEREMESIS GRAVIDARUM: Status: ACTIVE | Noted: 2022-08-29

## 2022-08-29 PROCEDURE — G0378 HOSPITAL OBSERVATION PER HR: HCPCS

## 2022-08-29 PROCEDURE — 96376 TX/PRO/DX INJ SAME DRUG ADON: CPT

## 2022-08-29 PROCEDURE — 96361 HYDRATE IV INFUSION ADD-ON: CPT

## 2022-08-29 PROCEDURE — 99217 PR OBSERVATION CARE DISCHARGE MANAGEMENT: CPT | Performed by: OBSTETRICS & GYNECOLOGY

## 2022-08-29 RX ORDER — PROMETHAZINE HYDROCHLORIDE 12.5 MG/1
12.5 TABLET ORAL EVERY 6 HOURS PRN
Status: DISCONTINUED | OUTPATIENT
Start: 2022-08-29 | End: 2022-08-29 | Stop reason: HOSPADM

## 2022-08-29 RX ORDER — PROMETHAZINE HYDROCHLORIDE 12.5 MG/1
12.5 TABLET ORAL EVERY 6 HOURS PRN
Qty: 20 TABLET | Refills: 0 | Status: SHIPPED | OUTPATIENT
Start: 2022-08-29 | End: 2022-11-03

## 2022-08-29 RX ADMIN — CITALOPRAM HYDROBROMIDE 20 MG: 20 TABLET ORAL at 08:18

## 2022-08-29 RX ADMIN — Medication 10 ML: at 08:19

## 2022-08-29 RX ADMIN — DROXIDOPA 300 MG: 300 CAPSULE ORAL at 08:18

## 2022-08-29 RX ADMIN — POTASSIUM CHLORIDE, DEXTROSE MONOHYDRATE AND SODIUM CHLORIDE 125 ML/HR: 150; 5; 450 INJECTION, SOLUTION INTRAVENOUS at 04:10

## 2022-08-29 RX ADMIN — PYRIDOXINE HCL TAB 50 MG 50 MG: 50 TAB at 08:18

## 2022-08-29 RX ADMIN — BISOPROLOL FUMARATE 5 MG: 5 TABLET ORAL at 08:18

## 2022-08-29 RX ADMIN — FAMOTIDINE 20 MG: 10 INJECTION, SOLUTION INTRAVENOUS at 08:18

## 2022-08-29 RX ADMIN — DROXIDOPA 200 MG: 200 CAPSULE ORAL at 08:19

## 2022-08-29 RX ADMIN — FLUDROCORTISONE ACETATE 200 MCG: 0.1 TABLET ORAL at 08:18

## 2022-08-29 RX ADMIN — PANTOPRAZOLE SODIUM 40 MG: 40 TABLET, DELAYED RELEASE ORAL at 05:11

## 2022-08-29 NOTE — TELEPHONE ENCOUNTER
"Pt called wanting to speak to a nurse. Pt is waiting to be discharged and was seen by [facility] this morning. They won't let her go before filling her rx, and are telling her that it hasn't been put in or her \"chart is open\". It appears O[facility] added rx at 756 this morning after seeing her at rounds. Can we get prescription taken care of or call pt to let her know what's going on, so she can be discharged?  "

## 2022-08-29 NOTE — TELEPHONE ENCOUNTER
lvom for pt to CB     If patient calls back: medication was sent earlier this morning and was signed off by Dr. Call

## 2022-08-29 NOTE — OUTREACH NOTE
Prep Survey    Flowsheet Row Responses   [facility] facility patient discharged from? [facility]   Is LACE score < 7 ? Yes   Emergency Room discharge w/ pulse ox? No   Eligibility [facility]   Date of Admission 08/28/22   Date of Discharge 08/29/22   Discharge Disposition Home or Self Care   Discharge diagnosis dehydration with hyperemesis gravidarum (10 weeks pregnant)   Does the patient have one of the following disease processes/diagnoses(primary or secondary)? Other   Does the patient have Home health ordered? No   Is there a DME ordered? No   Prep survey completed? Yes          AMOL MONTES - Registered Nurse

## 2022-08-30 ENCOUNTER — TRANSITIONAL CARE MANAGEMENT TELEPHONE ENCOUNTER (OUTPATIENT)
Dept: CALL CENTER | Facility: HOSPITAL | Age: 27
End: 2022-08-30

## 2022-08-30 LAB — BACTERIA SPEC AEROBE CULT: NO GROWTH

## 2022-08-30 NOTE — OUTREACH NOTE
Call Center TCM Note    Flowsheet Row Responses   [facility] patient discharged from? [facility]   Does the patient have one of the following disease processes/diagnoses(primary or secondary)? Other   TCM attempt successful? Yes   Call start time 1256   Call end time 1257   Discharge diagnosis dehydration with hyperemesis gravidarum (10 weeks pregnant)   Meds reviewed with patient/caregiver? Yes   Is the patient having any side effects they believe may be caused by any medication additions or changes? No   Does the patient have all medications ordered at discharge? Yes   Is the patient taking all medications as directed (includes completed medication regime)? Yes   Does the patient have a primary care provider?  Yes   Does the patient have an appointment with their PCP within 7 days of discharge? No   Comments regarding PCP OB GYN IS PROVIDING HER CARE RIGHT NOW. OBGYN APPOINTMENT IS 9/8/22   Has the patient kept scheduled appointments due by today? N/A   Has home health visited the patient within 72 hours of discharge? N/A   Psychosocial issues? No   Did the patient receive a copy of their discharge instructions? Yes   Nursing interventions Reviewed instructions with patient   What is the patient's perception of their health status since discharge? Improving   Is the patient/caregiver able to teach back signs and symptoms related to disease process for when to call PCP? Yes   Is the patient/caregiver able to teach back signs and symptoms related to disease process for when to call 911? Yes   Is the patient/caregiver able to teach back the hierarchy of who to call/visit for symptoms/problems? PCP, Specialist, Home health nurse, Urgent Care, ED, 911 Yes   If the patient is a current smoker, are they able to teach back resources for cessation? Not a smoker   TCM call completed? Yes          Janet Baker LPN    8/30/2022, 12:59 EDT

## 2022-09-06 DIAGNOSIS — K20.90 ESOPHAGITIS: ICD-10-CM

## 2022-09-07 RX ORDER — DEXLANSOPRAZOLE 60 MG/1
CAPSULE, DELAYED RELEASE ORAL
Qty: 30 CAPSULE | Refills: 0 | Status: SHIPPED | OUTPATIENT
Start: 2022-09-07 | End: 2023-03-22

## 2022-09-08 ENCOUNTER — ROUTINE PRENATAL (OUTPATIENT)
Dept: OBSTETRICS AND GYNECOLOGY | Facility: CLINIC | Age: 27
End: 2022-09-08

## 2022-09-08 ENCOUNTER — HOSPITAL ENCOUNTER (OUTPATIENT)
Dept: WOMENS IMAGING | Facility: HOSPITAL | Age: 27
Discharge: HOME | End: 2022-09-08
Admitting: OBSTETRICS & GYNECOLOGY

## 2022-09-08 ENCOUNTER — OFFICE VISIT (OUTPATIENT)
Dept: OBSTETRICS AND GYNECOLOGY | Facility: HOSPITAL | Age: 27
End: 2022-09-08

## 2022-09-08 VITALS — SYSTOLIC BLOOD PRESSURE: 104 MMHG | DIASTOLIC BLOOD PRESSURE: 60 MMHG | BODY MASS INDEX: 45.56 KG/M2 | WEIGHT: 293 LBS

## 2022-09-08 VITALS
DIASTOLIC BLOOD PRESSURE: 57 MMHG | SYSTOLIC BLOOD PRESSURE: 100 MMHG | WEIGHT: 293 LBS | HEIGHT: 70 IN | BODY MASS INDEX: 41.95 KG/M2

## 2022-09-08 DIAGNOSIS — G90.A POTS (POSTURAL ORTHOSTATIC TACHYCARDIA SYNDROME): ICD-10-CM

## 2022-09-08 DIAGNOSIS — G90.A POTS (POSTURAL ORTHOSTATIC TACHYCARDIA SYNDROME): Chronic | ICD-10-CM

## 2022-09-08 DIAGNOSIS — O99.210 OBESITY IN PREGNANCY, ANTEPARTUM: ICD-10-CM

## 2022-09-08 DIAGNOSIS — M79.7 FIBROMYALGIA: ICD-10-CM

## 2022-09-08 DIAGNOSIS — Z3A.12 12 WEEKS GESTATION OF PREGNANCY: Primary | ICD-10-CM

## 2022-09-08 DIAGNOSIS — E66.01 MORBID OBESITY WITH BMI OF 45.0-49.9, ADULT: Primary | ICD-10-CM

## 2022-09-08 LAB
BILIRUB BLD-MCNC: NEGATIVE MG/DL
CLARITY, POC: CLEAR
COLOR UR: ABNORMAL
GLUCOSE 1H P 50 G GLC PO SERPL-MCNC: 129 MG/DL (ref 65–139)
GLUCOSE UR STRIP-MCNC: NEGATIVE MG/DL
KETONES UR QL: NEGATIVE
LEUKOCYTE EST, POC: ABNORMAL
NITRITE UR-MCNC: NEGATIVE MG/ML
PH UR: 7 [PH] (ref 5–8)
PROT UR STRIP-MCNC: NEGATIVE MG/DL
RBC # UR STRIP: NEGATIVE /UL
SP GR UR: 1.02 (ref 1–1.03)
UROBILINOGEN UR QL: ABNORMAL

## 2022-09-08 PROCEDURE — 76813 OB US NUCHAL MEAS 1 GEST: CPT | Performed by: OBSTETRICS & GYNECOLOGY

## 2022-09-08 PROCEDURE — 0502F SUBSEQUENT PRENATAL CARE: CPT | Performed by: OBSTETRICS & GYNECOLOGY

## 2022-09-08 PROCEDURE — 76801 OB US < 14 WKS SINGLE FETUS: CPT

## 2022-09-08 PROCEDURE — 76813 OB US NUCHAL MEAS 1 GEST: CPT

## 2022-09-08 PROCEDURE — 76801 OB US < 14 WKS SINGLE FETUS: CPT | Performed by: OBSTETRICS & GYNECOLOGY

## 2022-09-08 RX ORDER — CETIRIZINE HYDROCHLORIDE 10 MG/1
TABLET ORAL
Qty: 30 TABLET | Refills: 0 | Status: SHIPPED | OUTPATIENT
Start: 2022-09-08 | End: 2022-11-19

## 2022-09-08 NOTE — PROGRESS NOTES
OB FOLLOW UP  CC- Here for care of pregnancy        Ammon Vargas is a 27 y.o.  11w6d patient being seen today for her obstetrical follow up visit. Patient reports no complaints. Occasional mild nausea without vomiting.    Her prenatal care is complicated by (and status) :   Patient Active Problem List   Diagnosis   • Asthma   • Recurrent major depressive disorder, in partial remission (HCC)   • Class 3 severe obesity due to excess calories with body mass index (BMI) of 40.0 to 44.9 in adult (HCC)   • Vitamin D deficiency   • Family history of breast cancer in mother   • Prenatal care, subsequent pregnancy   • Obesity in pregnancy, antepartum   • Hyperemesis gravidarum with metabolic disturbance, antepartum   • Maternal anemia in pregnancy, antepartum   • Depression affecting pregnancy, antepartum   • Hyperemesis gravidarum   • POTS (postural orthostatic tachycardia syndrome)     Glucola done today. Testing due at 11:02am    Desires genetic testing?: No    Ultrasound Today: Yes at [facility]    ROS -   Patient Reports : Nausea  Patient Denies: Loss of Fluid, Vaginal Spotting, Vision Changes, Headaches and Vomiting   Fetal Movement : Absent  All other systems reviewed and are negative.     The additional following portions of the patient's history were reviewed and updated as appropriate: allergies, current medications, past family history, past medical history, past social history, past surgical history and problem list.    I have reviewed and agree with the HPI, ROS, and historical information as entered above. Paola Clal MD    /60   Wt (!) 142 kg (313 lb)   LMP 2022   BMI 45.56 kg/m²         EXAM:     Prenatal Vitals  BP: 104/60  Weight: (!) 142 kg (313 lb)   Fetal Heart Rate: +          Urine Glucose Read-only: Negative  Urine Protein Read-only: Negative       Assessment and Plan    Problem List Items Addressed This Visit        Cardiac and Vasculature    POTS (postural  orthostatic tachycardia syndrome)       Gravid and     Obesity in pregnancy, antepartum    Overview     Advise 15 to 20 pound weight gain in pregnancy.  We will get early Glucola at 12 weeks.  Advise baby aspirin on weeks 12 through 36  PDC to perform anatomy ultrasound         Relevant Orders    Gestational Screen 1 Hr ([facility])      Other Visit Diagnoses     12 weeks gestation of pregnancy    -  Primary    Relevant Orders    POC Urinalysis Dipstick (Completed)          1. Pregnancy at 11w6d  2. Labs reviewed from New OB Visit.  3. Counseled on genetic testing, carrier status and option for NT screen  4. Activity and Exercise discussed.  5. Patient is on Prenatal vitamins  Return in about 4 weeks (around 10/6/2022).    Paola Call MD  2022

## 2022-09-14 ENCOUNTER — OFFICE VISIT (OUTPATIENT)
Dept: CARDIOLOGY | Facility: CLINIC | Age: 27
End: 2022-09-14

## 2022-09-14 VITALS
HEART RATE: 91 BPM | DIASTOLIC BLOOD PRESSURE: 66 MMHG | OXYGEN SATURATION: 98 % | BODY MASS INDEX: 39.68 KG/M2 | HEIGHT: 72 IN | WEIGHT: 293 LBS | SYSTOLIC BLOOD PRESSURE: 92 MMHG

## 2022-09-14 DIAGNOSIS — O21.0 HYPEREMESIS GRAVIDARUM: ICD-10-CM

## 2022-09-14 DIAGNOSIS — G90.A POTS (POSTURAL ORTHOSTATIC TACHYCARDIA SYNDROME): Primary | ICD-10-CM

## 2022-09-14 PROCEDURE — 99213 OFFICE O/P EST LOW 20 MIN: CPT | Performed by: INTERNAL MEDICINE

## 2022-09-14 NOTE — PROGRESS NOTES
Ammon Vargas  1995  [phone]      09/14/2022    [facility] CARDIOLOGY [facility]IN [facility]     [facility] [place]  [address] [place] [id number]    Chief Complaint   Patient presents with   • POTS (postural orthostatic tachycardia syndrome)       Problem List:   1. Syncope  a. Tilt table study 2014 showing cardioinhibitory and vasodepressor component symptomatic.  b. Negative event monitor thousand 14 sinus tachycardia up to 100 bpm less strenuous activities such as daily living  c. Echocardiogram November 7, 2014 EF 53% mild MR mild TR  d. Initiation of midodrine Celexa November 2014  e. Hospitalization February 12, 2015 for syncope events with negative CT scan, negative EEG  f. Initiation of Northera August 2015  2. Complex regional pain syndrome followed by FRIEDA Swift  3. Frequent MRSA infection  4. Migraine headache  5. obesity  6. Surgical history  a. Cholecystectomy  b. Tonsillectomy  c. Nasal surgery    Allergies  Allergies   Allergen Reactions   • Banana Anaphylaxis   • Cantaloupe (Diagnostic) Hives   • Eggs Or Egg-Derived Products Hives, Swelling and Angioedema   • Influenza Virus Vaccine Other (See Comments)     Unknown but has an allergy to eggs   • Mushroom Hives   • Shellfish-Derived Products Hives   • Rizatriptan Rash       Current Medications    Current Outpatient Medications:   •  albuterol sulfate HFA (PROAIR HFA) 108 (90 Base) MCG/ACT inhaler, Inhale 1 puff Every 4 (Four) Hours As Needed for Wheezing., Disp: 18 g, Rfl: 0  •  bisoprolol (ZEBeta) 5 MG tablet, TAKE ONE TABLET BY MOUTH TWICE A DAY, Disp: 60 tablet, Rfl: 6  •  cetirizine (zyrTEC) 10 MG tablet, TAKE ONE TABLET BY MOUTH DAILY, Disp: 30 tablet, Rfl: 0  •  citalopram (CeleXA) 20 MG tablet, TAKE ONE TABLET BY MOUTH EVERY NIGHT AT BEDTIME, Disp: 90 tablet, Rfl: 1  •  dexlansoprazole (DEXILANT) 60 MG capsule, TAKE ONE CAPSULE BY MOUTH DAILY, Disp: 30 capsule, Rfl: 0  •  doxylamine (UNISOM) 25 MG tablet,  Take 25 mg by mouth At Night As Needed for Sleep., Disp: , Rfl:   •  Droxidopa (Northera) 200 MG capsule, Take 200 mg by mouth 3 (Three) Times a Day. NORTHERA. BRAND NAME ONLY. TAKE LAST DOSE  3 HOURS BEFORE BEDTIME, TAKE WITH 300MG TO EQUAL 500 MG 3 times daily, Disp: 270 capsule, Rfl: 3  •  droxidopa (Northera) 300 MG capsule capsule, Take 1 capsule by mouth 3 (Three) Times a Day. NORTHERA BRAND NAME ONLY! TAKE LAST DOSE 3 HOURS BEFORE BEDTIME. TAKE WITH 200MG TO EQUAL 500 MG 3 times DAILY, Disp: 270 capsule, Rfl: 3  •  EPINEPHrine (EpiPen 2-Florencio) 0.3 MG/0.3ML solution auto-injector injection, Inject 0.3 mL into the appropriate muscle as directed by prescriber 1 (One) Time As Needed (anaphylaxis) for up to 1 dose., Disp: 2 each, Rfl: 0  •  fludrocortisone 0.1 MG tablet, TAKE TWO TABLETS BY MOUTH DAILY WITH BREAKFAST, Disp: 60 tablet, Rfl: 6  •  fluticasone (FLONASE) 50 MCG/ACT nasal spray, Use 2 sprays in each nostril as directed by provider Daily., Disp: 16 g, Rfl: 3  •  prenatal vitamin (prenatal, CLASSIC, vitamin) tablet, Take  by mouth Daily., Disp: , Rfl:   •  promethazine (PHENERGAN) 12.5 MG tablet, Take 1 tablet by mouth Every 6 (Six) Hours As Needed for Nausea or Vomiting., Disp: 20 tablet, Rfl: 0    History of Present Illness   HPI    Pt presents for follow up of POTS/IAST. Since the pt has seen us, pt is presently in her early second trimester of pregnancy.  She did have hyperemesis gravidarum and was admitted on August 28, 2022 secondary to decreased p.o. intake, sinus tachycardia, and low blood pressure.  She apparently received multiple infusions of IV saline and since that time has done much better.  She is now able to eat without significant emesis or gagging.  She has maintained her medications for her POTS syndrome during her hospitalization and is presently seeing a high risk OB/GYN doctor.  Her blood pressures have been running systolic 90 to 100 mmHg with heart rates of about 100 bpm.  Overall  "she has done reasonably well all things considered.  She has had a couple episodes of near syncope and syncope that were relatively short-lived.  She does note lightheadedness with positional changes.      Vitals:    09/14/22 1148   BP: 92/66   BP Location: Left arm   Patient Position: Sitting   Pulse: 91   SpO2: 98%   Weight: (!) 142 kg (313 lb 3.2 oz)   Height: 181.6 cm (71.5\")       PE:  General: NAD  Neck: no JVD, no carotid bruits, no TM  Heart RRR, NL S1, S2,no rubs, murmurs  Lungs: CTA, no wheezes, rhonchi, or rales  Abd: soft, non-tender, NL BS  Ext: No musculoskeletal deformities, no edema, cyanosis, or clubbing  Psych: normal mood and affect    Diagnostic Data:  Procedures    1. POTS (postural orthostatic tachycardia syndrome)    2. Hyperemesis gravidarum        Plan:    1) POTS  Continue present medications.    Long discussion with her and her mother today.  We may need to alter her medications as she gets closer to her third trimester due to demands of the baby on her cardiac performance.  No breast-feeding postdelivery.     2) Inappropriate sinus tachycardia-stable for now.       F/up in 5 months      "

## 2022-09-15 ENCOUNTER — LAB (OUTPATIENT)
Dept: LAB | Facility: HOSPITAL | Age: 27
End: 2022-09-15

## 2022-09-15 ENCOUNTER — OFFICE VISIT (OUTPATIENT)
Dept: FAMILY MEDICINE CLINIC | Facility: CLINIC | Age: 27
End: 2022-09-15

## 2022-09-15 VITALS
SYSTOLIC BLOOD PRESSURE: 110 MMHG | BODY MASS INDEX: 39.68 KG/M2 | HEART RATE: 72 BPM | TEMPERATURE: 96.9 F | DIASTOLIC BLOOD PRESSURE: 80 MMHG | WEIGHT: 293 LBS | OXYGEN SATURATION: 97 % | HEIGHT: 72 IN

## 2022-09-15 DIAGNOSIS — F33.41 RECURRENT MAJOR DEPRESSIVE DISORDER, IN PARTIAL REMISSION: ICD-10-CM

## 2022-09-15 DIAGNOSIS — G90.A POTS (POSTURAL ORTHOSTATIC TACHYCARDIA SYNDROME): ICD-10-CM

## 2022-09-15 DIAGNOSIS — Z00.01 ENCOUNTER FOR PREVENTATIVE ADULT HEALTH CARE EXAM WITH ABNORMAL FINDINGS: ICD-10-CM

## 2022-09-15 DIAGNOSIS — J45.21 MILD INTERMITTENT ASTHMA WITH ACUTE EXACERBATION: ICD-10-CM

## 2022-09-15 DIAGNOSIS — E66.01 CLASS 3 SEVERE OBESITY DUE TO EXCESS CALORIES WITH BODY MASS INDEX (BMI) OF 40.0 TO 44.9 IN ADULT, UNSPECIFIED WHETHER SERIOUS COMORBIDITY PRESENT: ICD-10-CM

## 2022-09-15 DIAGNOSIS — Z80.3 FAMILY HISTORY OF BREAST CANCER IN MOTHER: ICD-10-CM

## 2022-09-15 DIAGNOSIS — K21.9 GASTROESOPHAGEAL REFLUX DISEASE, UNSPECIFIED WHETHER ESOPHAGITIS PRESENT: ICD-10-CM

## 2022-09-15 DIAGNOSIS — E55.9 VITAMIN D DEFICIENCY: ICD-10-CM

## 2022-09-15 DIAGNOSIS — E87.1 HYPONATREMIA: ICD-10-CM

## 2022-09-15 DIAGNOSIS — Z00.01 ENCOUNTER FOR PREVENTATIVE ADULT HEALTH CARE EXAM WITH ABNORMAL FINDINGS: Primary | ICD-10-CM

## 2022-09-15 LAB
25(OH)D3 SERPL-MCNC: 27.1 NG/ML (ref 30–100)
ANION GAP SERPL CALCULATED.3IONS-SCNC: 13.4 MMOL/L (ref 5–15)
BUN SERPL-MCNC: 4 MG/DL (ref 6–20)
BUN/CREAT SERPL: 7.1 (ref 7–25)
CALCIUM SPEC-SCNC: 9.8 MG/DL (ref 8.6–10.5)
CHLORIDE SERPL-SCNC: 103 MMOL/L (ref 98–107)
CO2 SERPL-SCNC: 21.6 MMOL/L (ref 22–29)
CREAT SERPL-MCNC: 0.56 MG/DL (ref 0.57–1)
EGFRCR SERPLBLD CKD-EPI 2021: 128.5 ML/MIN/1.73
GLUCOSE SERPL-MCNC: 95 MG/DL (ref 65–99)
POTASSIUM SERPL-SCNC: 4 MMOL/L (ref 3.5–5.2)
SODIUM SERPL-SCNC: 138 MMOL/L (ref 136–145)
TSH SERPL DL<=0.05 MIU/L-ACNC: 1.37 UIU/ML (ref 0.27–4.2)

## 2022-09-15 PROCEDURE — 99395 PREV VISIT EST AGE 18-39: CPT | Performed by: PHYSICIAN ASSISTANT

## 2022-09-15 PROCEDURE — 84443 ASSAY THYROID STIM HORMONE: CPT

## 2022-09-15 PROCEDURE — 82306 VITAMIN D 25 HYDROXY: CPT

## 2022-09-15 PROCEDURE — 80048 BASIC METABOLIC PNL TOTAL CA: CPT

## 2022-09-15 RX ORDER — CITALOPRAM 20 MG/1
20 TABLET ORAL
Qty: 90 TABLET | Refills: 3 | Status: SHIPPED | OUTPATIENT
Start: 2022-09-15 | End: 2023-03-22 | Stop reason: HOSPADM

## 2022-09-15 RX ORDER — CITALOPRAM 20 MG/1
20 TABLET ORAL
Qty: 90 TABLET | Refills: 1 | Status: SHIPPED | OUTPATIENT
Start: 2022-09-15 | End: 2022-09-15 | Stop reason: SDUPTHER

## 2022-09-15 NOTE — PROGRESS NOTES
"Reason for visit    Ammon Vargas is a 27 y.o. female who presents for annual comprehensive exam.    Chief Complaint   Patient presents with   • Annual Exam   • Depression   • impaired glucose tolerance        HPI     Here for physical.   14 wks pregnant. Recently admitted for hyperemesis gravidarum. Her vomiting has now resolved and she is doing much better. She met with her cardiologist yesterday.     She is monitoring her blood pressures at home closely for POTS.    Diet/Physical activity:  -\"Trying\" to eat a good diet. Able to keep down chicken, salad, fruit and gatorade  -She is doing 30 minutes of walking/day    Immunizations:  -Allergic to influenza vaccine  -Advised patient to discuss COVID and pneumococcal vaccines with her OB    Cancer screening:   -Positive Fhx breast, lung, colon, adrenal. Her mother had genetic testing, no high risk mutations per patient.   -Negative Fhx: ovarian, cervical cancer    Depression: PHQ-2 Depression Screening  -Negative    Substance use:  -Denies tobacco, alcohol, and recreational drug use  -1 cup of coffee or soda/day    Sexual health:  -Monogamous relationship    Intimate partner violence   -Lives with  and her daughter  -Feels safe at home    Dental/vision/skin:  -Overdue for dental and eye exams  -Denies new/changing/concerning skin lesions    Past Medical History:   Diagnosis Date   • Asthma 2007   • CRPS (complex regional pain syndrome type I) 2013   • Eosinophilic esophagitis 2021   • Fibromyalgia 2013   • Internal hemorrhoids 02/22/2021   • Migraines 2013   • Morbid obesity with BMI of 45.0-49.9, adult ([facility]) 2022   • MRSA carrier    • POTS (postural orthostatic tachycardia syndrome) 2014   • Recurrent major depressive disorder, in partial remission ([facility]) 2013       Past Surgical History:   Procedure Laterality Date   • COLONOSCOPY     • D & C WITH SUCTION  07/2018   • EAR TUBES  1999   • ENDOSCOPY     • LAPAROSCOPIC CHOLECYSTECTOMY  2011   • TONSILLECTOMY " AND ADENOIDECTOMY  2002       Family History   Problem Relation Age of Onset   • No Known Problems Father    • Breast cancer Mother    • Diabetes Mother    • Cancer Paternal Grandfather    • Hearing loss Paternal Grandfather    • Heart attack Paternal Grandfather    • Colon cancer Maternal Grandmother    • Cancer Maternal Grandmother    • Diabetes Maternal Grandmother    • Kidney disease Maternal Grandmother    • Stroke Maternal Grandmother    • Cancer Maternal Grandfather    • Heart attack Maternal Grandfather    • Hearing loss Maternal Grandfather    • Stroke Maternal Grandfather    • Ovarian cancer Neg Hx    • Uterine cancer Neg Hx        Social History     Socioeconomic History   • Marital status:      Spouse name: Adrian Vargas   Tobacco Use   • Smoking status: Former Smoker     Packs/day: 2.00     Years: 7.00     Pack years: 14.00     Types: Cigarettes     Start date:      Quit date: 2017     Years since quittin.7   • Smokeless tobacco: Never Used   Vaping Use   • Vaping Use: Former   • Substances: Nicotine   • Devices: Refillable tank   Substance and Sexual Activity   • Alcohol use: Not Currently     Comment: 0-1/week when not pregnant   • Drug use: No   • Sexual activity: Yes     Partners: Male     Birth control/protection: None       Allergies   Allergen Reactions   • Banana Anaphylaxis   • Cantaloupe (Diagnostic) Hives   • Eggs Or Egg-Derived Products Hives, Swelling and Angioedema   • Influenza Virus Vaccine Other (See Comments)     Unknown but has an allergy to eggs   • Mushroom Hives   • Shellfish-Derived Products Hives   • Rizatriptan Rash       ROS    Review of Systems   Constitutional: Positive for appetite change and fatigue. Negative for diaphoresis, fever and unexpected weight loss.   HENT: Negative for congestion, hearing loss, sore throat and trouble swallowing.    Eyes: Negative for blurred vision and visual disturbance.   Respiratory: Negative for cough and shortness of breath.     Cardiovascular: Negative for chest pain.   Gastrointestinal: Positive for GERD (2x daily). Negative for abdominal pain, blood in stool, constipation and diarrhea.   Endocrine: Negative for polydipsia.   Genitourinary: Positive for breast pain and frequency. Negative for breast lump, dysuria, hematuria, vaginal bleeding and vaginal discharge.   Musculoskeletal: Negative for arthralgias and myalgias.   Skin: Negative for rash and skin lesions.   Neurological: Positive for dizziness and headache. Negative for numbness.   Psychiatric/Behavioral: Positive for sleep disturbance. Negative for depressed mood. The patient is not nervous/anxious.        Vitals:    09/15/22 0823   BP: 110/80   Pulse: 72   Temp: 96.9 °F (36.1 °C)   SpO2: 97%     Body mass index is 42.91 kg/m².      Current Outpatient Medications:   •  albuterol sulfate HFA (PROAIR HFA) 108 (90 Base) MCG/ACT inhaler, Inhale 1 puff Every 4 (Four) Hours As Needed for Wheezing., Disp: 18 g, Rfl: 0  •  bisoprolol (ZEBeta) 5 MG tablet, TAKE ONE TABLET BY MOUTH TWICE A DAY, Disp: 60 tablet, Rfl: 6  •  cetirizine (zyrTEC) 10 MG tablet, TAKE ONE TABLET BY MOUTH DAILY, Disp: 30 tablet, Rfl: 0  •  citalopram (CeleXA) 20 MG tablet, Take 1 tablet by mouth every night at bedtime., Disp: 90 tablet, Rfl: 3  •  dexlansoprazole (DEXILANT) 60 MG capsule, TAKE ONE CAPSULE BY MOUTH DAILY, Disp: 30 capsule, Rfl: 0  •  doxylamine (UNISOM) 25 MG tablet, Take 25 mg by mouth At Night As Needed for Sleep., Disp: , Rfl:   •  Droxidopa (Northera) 200 MG capsule, Take 200 mg by mouth 3 (Three) Times a Day. NORTHERA. BRAND NAME ONLY. TAKE LAST DOSE  3 HOURS BEFORE BEDTIME, TAKE WITH 300MG TO EQUAL 500 MG 3 times daily, Disp: 270 capsule, Rfl: 3  •  droxidopa (Northera) 300 MG capsule capsule, Take 1 capsule by mouth 3 (Three) Times a Day. NORTHERA BRAND NAME ONLY! TAKE LAST DOSE 3 HOURS BEFORE BEDTIME. TAKE WITH 200MG TO EQUAL 500 MG 3 times DAILY, Disp: 270 capsule, Rfl: 3  •  EPINEPHrine  (EpiPen 2-Florencio) 0.3 MG/0.3ML solution auto-injector injection, Inject 0.3 mL into the appropriate muscle as directed by prescriber 1 (One) Time As Needed (anaphylaxis) for up to 1 dose., Disp: 2 each, Rfl: 0  •  fludrocortisone 0.1 MG tablet, TAKE TWO TABLETS BY MOUTH DAILY WITH BREAKFAST, Disp: 60 tablet, Rfl: 6  •  fluticasone (FLONASE) 50 MCG/ACT nasal spray, Use 2 sprays in each nostril as directed by provider Daily., Disp: 16 g, Rfl: 3  •  prenatal vitamin (prenatal, CLASSIC, vitamin) tablet, Take  by mouth Daily., Disp: , Rfl:   •  promethazine (PHENERGAN) 12.5 MG tablet, Take 1 tablet by mouth Every 6 (Six) Hours As Needed for Nausea or Vomiting., Disp: 20 tablet, Rfl: 0    PE    Physical Exam  Vitals reviewed.   Constitutional:       General: She is not in acute distress.     Appearance: Normal appearance. She is well-developed. She is obese.   HENT:      Head: Normocephalic and atraumatic.      Right Ear: Hearing, tympanic membrane and ear canal normal.      Left Ear: Hearing, tympanic membrane and ear canal normal.      Mouth/Throat:      Mouth: Mucous membranes are moist.      Dentition: Normal dentition.      Pharynx: Oropharynx is clear.   Eyes:      Conjunctiva/sclera: Conjunctivae normal.      Pupils: Pupils are equal, round, and reactive to light.   Neck:      Thyroid: No thyroid mass or thyromegaly.      Vascular: No carotid bruit.   Cardiovascular:      Rate and Rhythm: Normal rate and regular rhythm.      Heart sounds: Normal heart sounds, S1 normal and S2 normal. No murmur heard.  Pulmonary:      Effort: Pulmonary effort is normal.      Breath sounds: Normal breath sounds.   Chest:   Breasts:      Right: No supraclavicular adenopathy.      Left: No supraclavicular adenopathy.        Comments: Defer breast exam to gynecology  Abdominal:      General: Bowel sounds are normal. There is no abdominal bruit.      Palpations: Abdomen is soft. There is no mass.      Tenderness: There is no abdominal  tenderness.   Genitourinary:     Comments: Defer to gynecology  Musculoskeletal:         General: Normal range of motion.      Cervical back: Normal range of motion and neck supple.   Lymphadenopathy:      Cervical: No cervical adenopathy.      Upper Body:      Right upper body: No supraclavicular adenopathy.      Left upper body: No supraclavicular adenopathy.   Skin:     General: Skin is warm and dry.      Findings: No rash.      Nails: There is no clubbing.      Comments: No suspicious nevi   Neurological:      Mental Status: She is alert.      Gait: Gait normal.      Deep Tendon Reflexes: Reflexes normal.   Psychiatric:         Speech: Speech normal.         Behavior: Behavior normal.         A/P    Problem List Items Addressed This Visit        Cardiac and Vasculature    POTS (postural orthostatic tachycardia syndrome)  -Followed by cardiology       Endocrine and Metabolic    Vitamin D deficiency      Class 3 severe obesity due to excess calories with body mass index (BMI) of 40.0 to 44.9 in adult (HCC)  -Defer significant diet changes until after pregnancy  -Continue exercise as directed by her OB       Family History    Family history of breast cancer in mother    Overview     Patient's mother was diagnosed at age 53.  BRCA testing was negative.  We discussed starting screening at age 40 or when patient feels anything abnormal.          Mental Health    Recurrent major depressive disorder, in partial remission (HCC)  -Stable, controlled  -Discussed risks of Celexa during pregnancy. Her OB has recommended that she continue taking it. Medication refilled.     Relevant Medications    doxylamine (UNISOM) 25 MG tablet    citalopram (CeleXA) 20 MG tablet       Pulmonary and Pneumonias    Asthma  -Stable  -Would like her to get pneumococcal vaccine after her pregnancy    Relevant Medications    albuterol sulfate HFA (PROAIR HFA) 108 (90 Base) MCG/ACT inhaler      Other Visit Diagnoses     Encounter for preventative  adult health care exam with abnormal findings    -  Primary  -Counseled patient regarding cancer screening, immunizations, healthy lifestyle, diet, maintaining a healthy weight, and exercise.  -Annual dental, eye, and gynecologic exams were recommended.   -He will hold off on her dental cleaning until after her pregnancy  -Check TSH, vitamin D  -RTC in 1 year or sooner if needed    Relevant Orders    TSH Rfx On Abnormal To Free T4    Vitamin D 25 Hydroxy    Basic Metabolic Panel    Gastroesophageal reflux disease, unspecified whether esophagitis present      -Discuss reflux precautions, pepcid prn      Hyponatremia      -130 in the ER on 8/28, secondary to GI losses  -Repeat bmp today          Plan of care was reviewed with patient at the conclusion of today's visit. Education was provided regarding diagnoses, management, treatment plan, and the importance of keeping follow-up appointments. The patient was counseled regarding the risks, benefits, and possible side-effects of treatment. Patient and/or family expresses understanding and agreement with the management plan.        VERONICA Rosas

## 2022-10-06 ENCOUNTER — ROUTINE PRENATAL (OUTPATIENT)
Dept: OBSTETRICS AND GYNECOLOGY | Facility: CLINIC | Age: 27
End: 2022-10-06

## 2022-10-06 VITALS — BODY MASS INDEX: 43.05 KG/M2 | SYSTOLIC BLOOD PRESSURE: 110 MMHG | WEIGHT: 293 LBS | DIASTOLIC BLOOD PRESSURE: 62 MMHG

## 2022-10-06 DIAGNOSIS — Z3A.15 15 WEEKS GESTATION OF PREGNANCY: Primary | ICD-10-CM

## 2022-10-06 DIAGNOSIS — E66.01 CLASS 3 SEVERE OBESITY DUE TO EXCESS CALORIES WITH BODY MASS INDEX (BMI) OF 40.0 TO 44.9 IN ADULT, UNSPECIFIED WHETHER SERIOUS COMORBIDITY PRESENT: ICD-10-CM

## 2022-10-06 LAB
CLARITY, POC: CLEAR
COLOR UR: YELLOW
GLUCOSE UR STRIP-MCNC: NEGATIVE MG/DL
PROT UR STRIP-MCNC: NEGATIVE MG/DL

## 2022-10-06 PROCEDURE — 0502F SUBSEQUENT PRENATAL CARE: CPT | Performed by: NURSE PRACTITIONER

## 2022-10-06 NOTE — PROGRESS NOTES
OB FOLLOW UP  CC- Here for care of pregnancy        Ammon Vargas is a 27 y.o.  15w6d patient being seen today for her obstetrical follow up visit. Patient reports cramping, headaches, vision changes, and vaginal pressure.     Her prenatal care is complicated by (and status) :   Patient Active Problem List   Diagnosis   • Asthma   • Recurrent major depressive disorder, in partial remission (HCC)   • Class 3 severe obesity due to excess calories with body mass index (BMI) of 40.0 to 44.9 in adult ([facility])   • Vitamin D deficiency   • Family history of breast cancer in mother   • Prenatal care, subsequent pregnancy   • Obesity in pregnancy, antepartum   • Hyperemesis gravidarum with metabolic disturbance, antepartum   • Maternal anemia in pregnancy, antepartum   • Depression affecting pregnancy, antepartum   • Hyperemesis gravidarum   • POTS (postural orthostatic tachycardia syndrome)       Flu Status: Declines  Ultrasound Today: No    ROS -   Patient Reports : Vision Changes, Headaches, Cramping and Vaginal Pressure  Patient Denies: Loss of Fluid, Vaginal Spotting, Nausea , Vomiting  and Contractions  Fetal Movement : Increased  All other systems reviewed and are negative.       The additional following portions of the patient's history were reviewed and updated as appropriate: allergies and current medications.    I have reviewed and agree with the HPI, ROS, and historical information as entered above. Rosita Bender, APRN        EXAM:     Prenatal Vitals  BP: 110/62  Weight: (!) 142 kg (313 lb)       Pelvic Exam:        Urine Glucose Read-only: Negative  Urine Protein Read-only: Negative           Assessment and Plan    Problem List Items Addressed This Visit        Endocrine and Metabolic    Class 3 severe obesity due to excess calories with body mass index (BMI) of 40.0 to 44.9 in adult ([facility])    Relevant Orders    [facility] [place]      Other Visit Diagnoses     15 weeks gestation  of pregnancy    -  Primary    Relevant Orders    POC Urinalysis Dipstick (Completed)          1. Pregnancy at 15w6d  2. Fetal status reassuring.   3. Counseled on MSAFP alone in relation to OTD and placental issues.  Declines MSAFP  4. Anatomy scan next visit.   5. Activity and Exercise discussed.  6. Patient is on Prenatal vitamins  Return in about 4 weeks (around 11/3/2022) for JNA to follow PDC.    Rosita Bender, APRN  10/06/2022

## 2022-11-03 ENCOUNTER — HOSPITAL ENCOUNTER (OUTPATIENT)
Dept: WOMENS IMAGING | Facility: HOSPITAL | Age: 27
Discharge: HOME | End: 2022-11-03
Admitting: OBSTETRICS & GYNECOLOGY

## 2022-11-03 ENCOUNTER — ROUTINE PRENATAL (OUTPATIENT)
Dept: OBSTETRICS AND GYNECOLOGY | Facility: CLINIC | Age: 27
End: 2022-11-03

## 2022-11-03 ENCOUNTER — OFFICE VISIT (OUTPATIENT)
Dept: OBSTETRICS AND GYNECOLOGY | Facility: HOSPITAL | Age: 27
End: 2022-11-03

## 2022-11-03 VITALS — BODY MASS INDEX: 43.82 KG/M2 | DIASTOLIC BLOOD PRESSURE: 63 MMHG | WEIGHT: 293 LBS | SYSTOLIC BLOOD PRESSURE: 120 MMHG

## 2022-11-03 VITALS — WEIGHT: 293 LBS | BODY MASS INDEX: 43.6 KG/M2 | SYSTOLIC BLOOD PRESSURE: 118 MMHG | DIASTOLIC BLOOD PRESSURE: 68 MMHG

## 2022-11-03 DIAGNOSIS — E66.01 CLASS 3 SEVERE OBESITY DUE TO EXCESS CALORIES WITH BODY MASS INDEX (BMI) OF 40.0 TO 44.9 IN ADULT, UNSPECIFIED WHETHER SERIOUS COMORBIDITY PRESENT: ICD-10-CM

## 2022-11-03 DIAGNOSIS — O99.019 MATERNAL ANEMIA IN PREGNANCY, ANTEPARTUM: ICD-10-CM

## 2022-11-03 DIAGNOSIS — G90.A POTS (POSTURAL ORTHOSTATIC TACHYCARDIA SYNDROME): Primary | ICD-10-CM

## 2022-11-03 DIAGNOSIS — O99.210 OBESITY IN PREGNANCY, ANTEPARTUM: ICD-10-CM

## 2022-11-03 DIAGNOSIS — O99.340 DEPRESSION AFFECTING PREGNANCY, ANTEPARTUM: ICD-10-CM

## 2022-11-03 DIAGNOSIS — F32.A DEPRESSION AFFECTING PREGNANCY, ANTEPARTUM: ICD-10-CM

## 2022-11-03 DIAGNOSIS — Z34.82 PRENATAL CARE, SUBSEQUENT PREGNANCY IN SECOND TRIMESTER: Primary | ICD-10-CM

## 2022-11-03 DIAGNOSIS — G90.A POTS (POSTURAL ORTHOSTATIC TACHYCARDIA SYNDROME): ICD-10-CM

## 2022-11-03 DIAGNOSIS — Z34.80 PRENATAL CARE OF MULTIGRAVIDA, ANTEPARTUM: ICD-10-CM

## 2022-11-03 DIAGNOSIS — E66.01 MORBID OBESITY WITH BMI OF 45.0-49.9, ADULT: ICD-10-CM

## 2022-11-03 DIAGNOSIS — O21.1 HYPEREMESIS GRAVIDARUM WITH METABOLIC DISTURBANCE, ANTEPARTUM: ICD-10-CM

## 2022-11-03 LAB
GLUCOSE UR STRIP-MCNC: NEGATIVE MG/DL
PROT UR STRIP-MCNC: NEGATIVE MG/DL

## 2022-11-03 PROCEDURE — 76811 OB US DETAILED SNGL FETUS: CPT

## 2022-11-03 PROCEDURE — 76811 OB US DETAILED SNGL FETUS: CPT | Performed by: OBSTETRICS & GYNECOLOGY

## 2022-11-03 PROCEDURE — 0502F SUBSEQUENT PRENATAL CARE: CPT | Performed by: OBSTETRICS & GYNECOLOGY

## 2022-11-03 NOTE — PROGRESS NOTES
OB FOLLOW UP  CC- Here for care of pregnancy        Ammon Vargas is a 27 y.o.  19w6d patient being seen today for her obstetrical follow up visit. Patient reports severe vaginal pressure/pain. Discussed bellyband and compression hose for comfort. Pt states Dr. Manley started her on a baby aspirin today to prevent pre-eclampsia. Reports her BP at home has averaged 110/50-60.     Her prenatal care is complicated by (and status) :   Patient Active Problem List   Diagnosis   • Asthma   • Recurrent major depressive disorder, in partial remission (HCC)   • Class 3 severe obesity due to excess calories with body mass index (BMI) of 40.0 to 44.9 in adult ([facility])   • Vitamin D deficiency   • Family history of breast cancer in mother   • Prenatal care, subsequent pregnancy   • Obesity in pregnancy, antepartum   • Hyperemesis gravidarum with metabolic disturbance, antepartum   • Maternal anemia in pregnancy, antepartum   • Depression affecting pregnancy, antepartum   • Hyperemesis gravidarum   • POTS (postural orthostatic tachycardia syndrome)       Flu Status: Declines  Ultrasound Today: Yes at PDC.  Report reviewed.    ROS -   Patient Reports : No Problems  Patient Denies: Loss of Fluid, Vaginal Spotting, Vision Changes, Headaches, Contractions and Epigastric pain  Fetal Movement : normal  All other systems reviewed and are negative.       The additional following portions of the patient's history were reviewed and updated as appropriate: allergies, current medications, past family history, past medical history, past social history, past surgical history and problem list.    I have reviewed and agree with the HPI, ROS, and historical information as entered above. Paola Call MD    /68   Wt (!) 144 kg (317 lb)   LMP 2022   BMI 43.60 kg/m²       EXAM:     Prenatal Vitals  BP: 118/68  Weight: (!) 144 kg (317 lb)   Fetal Heart Rate: 142          Urine Glucose Read-only: Negative  Urine  Protein Read-only: Negative       Assessment and Plan    Problem List Items Addressed This Visit        Cardiac and Vasculature    POTS (postural orthostatic tachycardia syndrome) - Primary       Gravid and     Prenatal care, subsequent pregnancy    Relevant Orders    POC Urinalysis Dipstick (Completed)    Obesity in pregnancy, antepartum    Overview     Advise 15 to 20 pound weight gain in pregnancy.  We will get early Glucola at 12 weeks.  Advise baby aspirin on weeks 12 through 36  PDC to perform anatomy ultrasound            Hematology and Neoplasia    Maternal anemia in pregnancy, antepartum       1. Pregnancy at 19w6d  2. Anatomy scan today is complete and appear within normal limits. and To follow with PDC at 32 weeks.  3. Fetal status reassuring.   4. Reiterated importance of taking her iron for her anemia and baby aspirin for preeclampsia prevention.  5. Discussed incorporating more protein into her diet.  Patient still having nausea at times, but reviewing her diet it is carb centric.  Yesterday she had a muffin, sandwich to his chips, Ramen.  6. Activity and Exercise discussed.  7. Patient is on Prenatal vitamins  8. Discussed bASA for PIH prevention from 12 to 36wk  Return in about 4 weeks (around 2022).    Paola Call MD  2022

## 2022-11-03 NOTE — PROGRESS NOTES
Documentation of the ultasound findings, images, and interpretations will be available in the patient's Viewpoint report located in the Chart Review Imaging tab in [employer].

## 2022-11-19 RX ORDER — CETIRIZINE HYDROCHLORIDE 10 MG/1
TABLET ORAL
Qty: 90 TABLET | Refills: 0 | Status: SHIPPED | OUTPATIENT
Start: 2022-11-19 | End: 2023-02-06

## 2022-11-19 NOTE — TELEPHONE ENCOUNTER
Rx Refill Note  Requested Prescriptions     Pending Prescriptions Disp Refills   • cetirizine (zyrTEC) 10 MG tablet [Pharmacy Med Name: CETIRIZINE HCL 10 MG TABLET] 30 tablet 0     Sig: TAKE ONE TABLET BY MOUTH DAILY      Last office visit with prescribing clinician: 9/15/2022      Next office visit with prescribing clinician: 9/18/2023            Epi Grijalva MA  11/19/22, 11:28 EST

## 2022-12-01 ENCOUNTER — ROUTINE PRENATAL (OUTPATIENT)
Dept: OBSTETRICS AND GYNECOLOGY | Facility: CLINIC | Age: 27
End: 2022-12-01

## 2022-12-01 VITALS — DIASTOLIC BLOOD PRESSURE: 78 MMHG | SYSTOLIC BLOOD PRESSURE: 112 MMHG | WEIGHT: 293 LBS | BODY MASS INDEX: 44.01 KG/M2

## 2022-12-01 DIAGNOSIS — Z34.80 PRENATAL CARE OF MULTIGRAVIDA, ANTEPARTUM: ICD-10-CM

## 2022-12-01 DIAGNOSIS — O99.210 OBESITY IN PREGNANCY, ANTEPARTUM: ICD-10-CM

## 2022-12-01 DIAGNOSIS — O99.019 MATERNAL ANEMIA IN PREGNANCY, ANTEPARTUM: Primary | ICD-10-CM

## 2022-12-01 LAB
GLUCOSE UR STRIP-MCNC: NEGATIVE MG/DL
PROT UR STRIP-MCNC: NEGATIVE MG/DL

## 2022-12-01 PROCEDURE — 0502F SUBSEQUENT PRENATAL CARE: CPT | Performed by: OBSTETRICS & GYNECOLOGY

## 2022-12-01 NOTE — PROGRESS NOTES
OB FOLLOW UP  CC- Here for care of pregnancy        Ammon Vargas is a 27 y.o.  23w6d patient being seen today for her obstetrical follow up visit. Patient reports no complaints..     Her prenatal care is complicated by (and status) :   Patient Active Problem List   Diagnosis   • Asthma   • Recurrent major depressive disorder, in partial remission (HCC)   • Class 3 severe obesity due to excess calories with body mass index (BMI) of 40.0 to 44.9 in adult (HCC)   • Vitamin D deficiency   • Family history of breast cancer in mother   • Prenatal care, subsequent pregnancy   • Obesity in pregnancy, antepartum   • Hyperemesis gravidarum with metabolic disturbance, antepartum   • Maternal anemia in pregnancy, antepartum   • Depression affecting pregnancy, antepartum   • Hyperemesis gravidarum   • POTS (postural orthostatic tachycardia syndrome)       Flu Status: Declines  Ultrasound Today: No     ROS -    Patient Reports : No Problems  Patient Denies: Loss of Fluid, Vaginal Spotting, Vision Changes, Headaches, Contractions and Epigastric pain  Fetal Movement : normal  All other systems reviewed and are negative.       The additional following portions of the patient's history were reviewed and updated as appropriate: allergies, current medications, past family history, past medical history, past social history, past surgical history and problem list.    I have reviewed and agree with the HPI, ROS, and historical information as entered above. Paola Call MD    /78   Wt (!) 145 kg (320 lb)   LMP 2022   BMI 44.01 kg/m²       EXAM:     Prenatal Vitals  BP: 112/78  Weight: (!) 145 kg (320 lb)   Fetal Heart Rate: 150      Fundal Height (cm): 28 cm        Urine Glucose Read-only: Negative  Urine Protein Read-only: Negative       Assessment and Plan    Problem List Items Addressed This Visit        Gravid and     Prenatal care, subsequent pregnancy    Obesity in pregnancy, antepartum     Overview     Advise 15 to 20 pound weight gain in pregnancy.  We will get early Glucola at 12 weeks.  Advise baby aspirin on weeks 12 through 36  PDC to perform anatomy ultrasound            Hematology and Neoplasia    Maternal anemia in pregnancy, antepartum - Primary       1. Pregnancy at 23w6d  2. Fetal status reassuring.  3. No US indicated today.  4. 1 hour gtt, CBC, Antibody screen and TDAP next visit. Instructions given  5. Discussed/encouraged TDAP vaccination after 28 weeks  6. Activity and Exercise discussed.  Return in about 4 weeks (around 12/29/2022) for glucola.    Paola Call MD  12/01/2022

## 2022-12-29 ENCOUNTER — OFFICE VISIT (OUTPATIENT)
Dept: CARDIOLOGY | Facility: CLINIC | Age: 27
End: 2022-12-29

## 2022-12-29 VITALS
SYSTOLIC BLOOD PRESSURE: 120 MMHG | OXYGEN SATURATION: 97 % | HEIGHT: 71 IN | HEART RATE: 125 BPM | BODY MASS INDEX: 41.02 KG/M2 | DIASTOLIC BLOOD PRESSURE: 70 MMHG | WEIGHT: 293 LBS

## 2022-12-29 DIAGNOSIS — G90.A POTS (POSTURAL ORTHOSTATIC TACHYCARDIA SYNDROME): Primary | ICD-10-CM

## 2022-12-29 DIAGNOSIS — R00.0 INAPPROPRIATE SINUS NODE TACHYCARDIA: ICD-10-CM

## 2022-12-29 PROBLEM — I47.11 INAPPROPRIATE SINUS NODE TACHYCARDIA: Status: ACTIVE | Noted: 2022-12-29

## 2022-12-29 PROCEDURE — 93000 ELECTROCARDIOGRAM COMPLETE: CPT | Performed by: INTERNAL MEDICINE

## 2022-12-29 PROCEDURE — 99213 OFFICE O/P EST LOW 20 MIN: CPT | Performed by: INTERNAL MEDICINE

## 2022-12-29 RX ORDER — ASPIRIN 81 MG/1
81 TABLET, CHEWABLE ORAL DAILY
COMMUNITY
End: 2023-03-22

## 2022-12-29 NOTE — PROGRESS NOTES
Ammon Vargas  1995  [phone]    12/29/2022    [facility]     Referring Provider: No ref. provider found     Eb Prabhakar, PA  [address] [facility] [address]    Chief Complaint   Patient presents with   • POTS (postural orthostatic tachycardia syndrome)        Problem List:   1. Syncope  a. Tilt table study 2014 showing cardioinhibitory and vasodepressor component symptomatic.  b. Negative event monitor thousand 14 sinus tachycardia up to 100 bpm less strenuous activities such as daily living  c. Echocardiogram November 7, 2014 EF 53% mild MR mild TR  d. Initiation of midodrine Celexa November 2014  e. Hospitalization February 12, 2015 for syncope events with negative CT scan, negative EEG  f. Initiation of Northera August 2015  2. Complex regional pain syndrome followed by FRIEDA Swift  3. Frequent MRSA infection  4. Migraine headache  5. obesity  6. Surgical history  a. Cholecystectomy  b. Tonsillectomy  c. Nasal surgery    Allergies  Allergies   Allergen Reactions   • Banana Anaphylaxis   • Cantaloupe (Diagnostic) Hives   • Eggs Or Egg-Derived Products Hives, Swelling and Angioedema   • Influenza Virus Vaccine Other (See Comments)     Unknown but has an allergy to eggs   • Mushroom Hives   • Shellfish-Derived Products Hives   • Rizatriptan Rash       Current Medications    Current Outpatient Medications:   •  albuterol sulfate HFA (PROAIR HFA) 108 (90 Base) MCG/ACT inhaler, Inhale 1 puff Every 4 (Four) Hours As Needed for Wheezing., Disp: 18 g, Rfl: 0  •  aspirin 81 MG chewable tablet, Chew 81 mg Daily., Disp: , Rfl:   •  bisoprolol (ZEBeta) 5 MG tablet, TAKE ONE TABLET BY MOUTH TWICE A DAY, Disp: 60 tablet, Rfl: 6  •  cetirizine (zyrTEC) 10 MG tablet, TAKE ONE TABLET BY MOUTH DAILY, Disp: 90 tablet, Rfl: 0  •  citalopram (CeleXA) 20 MG tablet, Take 1 tablet by mouth every night at bedtime., Disp: 90 tablet, Rfl: 3  •  dexlansoprazole (DEXILANT)  "60 MG capsule, TAKE ONE CAPSULE BY MOUTH DAILY, Disp: 30 capsule, Rfl: 0  •  Droxidopa (Northera) 200 MG capsule, Take 200 mg by mouth 3 (Three) Times a Day. NORTHERA. BRAND NAME ONLY. TAKE LAST DOSE  3 HOURS BEFORE BEDTIME, TAKE WITH 300MG TO EQUAL 500 MG 3 times daily (Patient taking differently: Take 200 mg by mouth 3 (Three) Times a Day. 200 twice a day because of insurance issues), Disp: 270 capsule, Rfl: 3  •  EPINEPHrine (EpiPen 2-Florencio) 0.3 MG/0.3ML solution auto-injector injection, Inject 0.3 mL into the appropriate muscle as directed by prescriber 1 (One) Time As Needed (anaphylaxis) for up to 1 dose., Disp: 2 each, Rfl: 0  •  Ferrous Sulfate (IRON PO), Take  by mouth Daily., Disp: , Rfl:   •  fludrocortisone 0.1 MG tablet, TAKE TWO TABLETS BY MOUTH DAILY WITH BREAKFAST, Disp: 60 tablet, Rfl: 6  •  fluticasone (FLONASE) 50 MCG/ACT nasal spray, Use 2 sprays in each nostril as directed by provider Daily., Disp: 16 g, Rfl: 3  •  prenatal vitamin (prenatal, CLASSIC, vitamin) tablet, Take  by mouth Daily., Disp: , Rfl:     History of Present Illness     Pt presents for follow up of POTS/IAST during pregancy. She is now 28 weeks. Since we last saw the pt 3 months ago, she has overall been doing well. She states she has been feeling well without presyncope or syncope. She has not noticed her HR racing unless she is walking far distances. She states everything is going well with her pregnancy. She denies SOB, CP. No recent hospitalizations, ER visits, bleeding, or TIA/CVA symptoms. Overall feels well. Just \"feels pregnant\". BP stable at home    ROS:  General:  + fatigue, - weight gain or loss  Cardiovascular:  Denies CP, PND, syncope, near syncope, edema or palpitations.  Pulmonary:  Denies TERAN, cough, or wheezing      Vitals:    12/29/22 1343   BP: 120/70   BP Location: Right arm   Patient Position: Sitting   Cuff Size: Adult   Pulse: (!) 125   SpO2: 97%   Weight: (!) 147 kg (323 lb 6.4 oz)   Height: 180.3 cm (71\") "     Body mass index is 45.11 kg/m².  PE:  General: NAD. A & O x 3   Neck: no JVD, no carotid bruits, no TM  Heart RRR, tachycardic, NL S1, S2, S4 present, no rubs, murmurs  Lungs: CTA, no wheezes, rhonchi, or rales  Abd: soft, non-tender, NL BS  Ext: No musculoskeletal deformities, no edema, cyanosis, or clubbing  Psych: normal mood and affect    Diagnostic Data:        ECG 12 Lead    Date/Time: 12/29/2022 2:24 PM  Performed by: Chavez Moran MD  Authorized by: Chavez Moran MD   Comparison: compared with previous ECG from 5/1/2019  Comparison to previous ECG: HR faster  Rhythm: sinus tachycardia  BPM: 125              1. POTS (postural orthostatic tachycardia syndrome)    2. Inappropriate sinus node tachycardia          Plan:    1) POTS  Continue present medications.  Long discussion with her and her mother today.   May need to alter her medications as she gets closer to her third trimester due to demands of the baby on her cardiac performance.  No breast-feeding postdelivery. Dr Ashumn and Milligan: doing well clinically right now     2) Inappropriate sinus tachycardia-stable for now.      F/up in 6 months    Scribed for Chavez Moran MD by Camille Hopkins PA-C. 12/29/2022  14:02 Chavez RODRIGUEZ MD, personally performed the services described in this documentation as scribed by the above named individual in my presence, and it is both accurate and complete.  12/29/2022  14:22 EST

## 2023-01-05 ENCOUNTER — ROUTINE PRENATAL (OUTPATIENT)
Dept: OBSTETRICS AND GYNECOLOGY | Facility: CLINIC | Age: 28
End: 2023-01-05
Payer: COMMERCIAL

## 2023-01-05 VITALS — WEIGHT: 293 LBS | SYSTOLIC BLOOD PRESSURE: 118 MMHG | DIASTOLIC BLOOD PRESSURE: 80 MMHG | BODY MASS INDEX: 44.8 KG/M2

## 2023-01-05 DIAGNOSIS — O99.210 OBESITY IN PREGNANCY, ANTEPARTUM: ICD-10-CM

## 2023-01-05 DIAGNOSIS — O99.019 MATERNAL ANEMIA IN PREGNANCY, ANTEPARTUM: ICD-10-CM

## 2023-01-05 DIAGNOSIS — Z34.80 PRENATAL CARE OF MULTIGRAVIDA, ANTEPARTUM: Primary | ICD-10-CM

## 2023-01-05 DIAGNOSIS — G90.A POTS (POSTURAL ORTHOSTATIC TACHYCARDIA SYNDROME): ICD-10-CM

## 2023-01-05 PROBLEM — O21.0 HYPEREMESIS GRAVIDARUM: Status: RESOLVED | Noted: 2022-08-29 | Resolved: 2023-01-05

## 2023-01-05 LAB
GLUCOSE UR STRIP-MCNC: NEGATIVE MG/DL
PROT UR STRIP-MCNC: NEGATIVE MG/DL

## 2023-01-05 PROCEDURE — 90471 IMMUNIZATION ADMIN: CPT | Performed by: OBSTETRICS & GYNECOLOGY

## 2023-01-05 PROCEDURE — 0502F SUBSEQUENT PRENATAL CARE: CPT | Performed by: OBSTETRICS & GYNECOLOGY

## 2023-01-05 PROCEDURE — 90715 TDAP VACCINE 7 YRS/> IM: CPT | Performed by: OBSTETRICS & GYNECOLOGY

## 2023-01-05 NOTE — PROGRESS NOTES
OB FOLLOW UP  CC- Here for care of pregnancy        Ammon Vargas is a 27 y.o.  28w6d patient being seen today for her obstetrical follow up. Patient reports occasional BH contractions and sciatic nerve pain.    Patient undergoing Glucola testing today. She is due for her testing at 1:00pm.       MBT: O+  Rhogam: not indicated.  28 week packet: reviewed with patient , counseled on fetal movement , pediatrician list reviewed, breast pump discussed and childbirth classes reviewed  TDAP: given today  Flu Status: Declines  Ultrasound Today: No    Her prenatal care is complicated by (and status) :    Patient Active Problem List   Diagnosis   • Asthma   • Recurrent major depressive disorder, in partial remission (HCC)   • Class 3 severe obesity due to excess calories with body mass index (BMI) of 40.0 to 44.9 in adult ([facility])   • Vitamin D deficiency   • Family history of breast cancer in mother   • Prenatal care, subsequent pregnancy   • Obesity in pregnancy, antepartum   • Hyperemesis gravidarum with metabolic disturbance, antepartum   • Maternal anemia in pregnancy, antepartum   • Depression affecting pregnancy, antepartum   • POTS (postural orthostatic tachycardia syndrome)   • Inappropriate sinus node tachycardia         ROS -   Patient Reports : Contractions and Sciatic nerve pain  Patient Denies: Loss of Fluid, Vaginal Spotting, Vision Changes, Headaches, Nausea , Vomiting  and Epigastric pain  Fetal Movement : normal    The additional following portions of the patient's history were reviewed and updated as appropriate: allergies, current medications, past family history, past medical history, past social history, past surgical history and problem list.    I have reviewed and agree with the HPI, ROS, and historical information as entered above. Paola Call MD    /80   Wt (!) 146 kg (321 lb 3.2 oz)   LMP 2022   BMI 44.80 kg/m²         EXAM:     Prenatal Vitals  BP:  118/80  Weight: (!) 146 kg (321 lb 3.2 oz)   Fetal Heart Rate: 145      Fundal Height (cm): 31 cm        Urine Glucose Read-only: Negative  Urine Protein Read-only: Negative       Assessment and Plan    Problem List Items Addressed This Visit        Cardiac and Vasculature    POTS (postural orthostatic tachycardia syndrome)       Gravid and     Prenatal care, subsequent pregnancy - Primary    Relevant Orders    POC Urinalysis Dipstick (Completed)    CBC (No Diff)    Gestational Screen 1 Hr ([facility])    Antibody Screen    Obesity in pregnancy, antepartum    Overview     Advise 15 to 20 pound weight gain in pregnancy.  We will get early Glucola at 12 weeks.  Advise baby aspirin on weeks 12 through 36  PDC to perform anatomy ultrasound            Hematology and Neoplasia    Maternal anemia in pregnancy, antepartum    Relevant Medications    Ferrous Sulfate (IRON PO)       1. Pregnancy at 28w6d  2. 1 hr Glucola, CBC, and antibody screen today  and TDAP given today  3. Fetal movement/PTL or Labor precautions  4. Activity and Exercise discussed.  Return in about 3 weeks (around 2023) for PDC same day.    Paola Call MD  2023

## 2023-01-06 LAB
BLD GP AB SCN SERPL QL: NEGATIVE
ERYTHROCYTE [DISTWIDTH] IN BLOOD BY AUTOMATED COUNT: 15.1 % (ref 12.3–15.4)
GLUCOSE 1H P 50 G GLC PO SERPL-MCNC: 142 MG/DL (ref 65–139)
HCT VFR BLD AUTO: 33.7 % (ref 34–46.6)
HGB BLD-MCNC: 10.9 G/DL (ref 12–15.9)
MCH RBC QN AUTO: 25 PG (ref 26.6–33)
MCHC RBC AUTO-ENTMCNC: 32.3 G/DL (ref 31.5–35.7)
MCV RBC AUTO: 77.3 FL (ref 79–97)
PLATELET # BLD AUTO: 407 10*3/MM3 (ref 140–450)
RBC # BLD AUTO: 4.36 10*6/MM3 (ref 3.77–5.28)
WBC # BLD AUTO: 13.2 10*3/MM3 (ref 3.4–10.8)

## 2023-01-07 PROBLEM — R73.09 ELEVATED GLUCOSE TOLERANCE TEST: Status: ACTIVE | Noted: 2023-01-07

## 2023-01-10 ENCOUNTER — TELEPHONE (OUTPATIENT)
Dept: OBSTETRICS AND GYNECOLOGY | Facility: CLINIC | Age: 28
End: 2023-01-10
Payer: COMMERCIAL

## 2023-01-12 ENCOUNTER — LAB (OUTPATIENT)
Dept: OBSTETRICS AND GYNECOLOGY | Facility: CLINIC | Age: 28
End: 2023-01-12
Payer: COMMERCIAL

## 2023-01-12 DIAGNOSIS — R73.09 ELEVATED GLUCOSE TOLERANCE TEST: Primary | ICD-10-CM

## 2023-01-13 LAB
GLUCOSE 1H P 100 G GLC PO SERPL-MCNC: 171 MG/DL (ref 65–179)
GLUCOSE 2H P 100 G GLC PO SERPL-MCNC: 166 MG/DL (ref 65–154)
GLUCOSE 3H P 100 G GLC PO SERPL-MCNC: 82 MG/DL (ref 65–139)
GLUCOSE P FAST SERPL-MCNC: 84 MG/DL (ref 65–94)

## 2023-01-25 PROBLEM — O21.1 HYPEREMESIS GRAVIDARUM WITH METABOLIC DISTURBANCE, ANTEPARTUM: Status: RESOLVED | Noted: 2022-08-28 | Resolved: 2023-01-25

## 2023-01-26 ENCOUNTER — OFFICE VISIT (OUTPATIENT)
Dept: OBSTETRICS AND GYNECOLOGY | Facility: HOSPITAL | Age: 28
End: 2023-01-26
Payer: COMMERCIAL

## 2023-01-26 ENCOUNTER — HOSPITAL ENCOUNTER (OUTPATIENT)
Dept: WOMENS IMAGING | Facility: HOSPITAL | Age: 28
Discharge: HOME | End: 2023-01-26
Admitting: OBSTETRICS & GYNECOLOGY
Payer: COMMERCIAL

## 2023-01-26 ENCOUNTER — ROUTINE PRENATAL (OUTPATIENT)
Dept: OBSTETRICS AND GYNECOLOGY | Facility: CLINIC | Age: 28
End: 2023-01-26
Payer: COMMERCIAL

## 2023-01-26 VITALS — SYSTOLIC BLOOD PRESSURE: 112 MMHG | DIASTOLIC BLOOD PRESSURE: 70 MMHG | WEIGHT: 293 LBS | BODY MASS INDEX: 45.75 KG/M2

## 2023-01-26 VITALS — WEIGHT: 293 LBS | SYSTOLIC BLOOD PRESSURE: 113 MMHG | DIASTOLIC BLOOD PRESSURE: 55 MMHG | BODY MASS INDEX: 46.05 KG/M2

## 2023-01-26 DIAGNOSIS — O99.340 DEPRESSION AFFECTING PREGNANCY, ANTEPARTUM: ICD-10-CM

## 2023-01-26 DIAGNOSIS — O21.1 HYPEREMESIS GRAVIDARUM WITH METABOLIC DISTURBANCE, ANTEPARTUM: ICD-10-CM

## 2023-01-26 DIAGNOSIS — F32.A DEPRESSION AFFECTING PREGNANCY, ANTEPARTUM: ICD-10-CM

## 2023-01-26 DIAGNOSIS — O99.019 MATERNAL ANEMIA IN PREGNANCY, ANTEPARTUM: ICD-10-CM

## 2023-01-26 DIAGNOSIS — G90.A POTS (POSTURAL ORTHOSTATIC TACHYCARDIA SYNDROME): Primary | ICD-10-CM

## 2023-01-26 DIAGNOSIS — O99.210 OBESITY IN PREGNANCY, ANTEPARTUM: ICD-10-CM

## 2023-01-26 DIAGNOSIS — E66.01 CLASS 3 SEVERE OBESITY DUE TO EXCESS CALORIES WITH BODY MASS INDEX (BMI) OF 40.0 TO 44.9 IN ADULT, UNSPECIFIED WHETHER SERIOUS COMORBIDITY PRESENT: ICD-10-CM

## 2023-01-26 DIAGNOSIS — G90.A POTS (POSTURAL ORTHOSTATIC TACHYCARDIA SYNDROME): ICD-10-CM

## 2023-01-26 DIAGNOSIS — R73.09 ELEVATED GLUCOSE TOLERANCE TEST: ICD-10-CM

## 2023-01-26 DIAGNOSIS — R04.0 BLEEDING NOSE: ICD-10-CM

## 2023-01-26 DIAGNOSIS — Z34.80 PRENATAL CARE OF MULTIGRAVIDA, ANTEPARTUM: Primary | ICD-10-CM

## 2023-01-26 DIAGNOSIS — Z34.82 PRENATAL CARE, SUBSEQUENT PREGNANCY IN SECOND TRIMESTER: ICD-10-CM

## 2023-01-26 LAB
BASOPHILS # BLD AUTO: 0.03 10*3/MM3 (ref 0–0.2)
BASOPHILS NFR BLD AUTO: 0.2 % (ref 0–1.5)
EOSINOPHIL # BLD AUTO: 0.18 10*3/MM3 (ref 0–0.4)
EOSINOPHIL NFR BLD AUTO: 1.5 % (ref 0.3–6.2)
ERYTHROCYTE [DISTWIDTH] IN BLOOD BY AUTOMATED COUNT: 14.9 % (ref 12.3–15.4)
GLUCOSE UR STRIP-MCNC: NEGATIVE MG/DL
HCT VFR BLD AUTO: 31.1 % (ref 34–46.6)
HGB BLD-MCNC: 10.3 G/DL (ref 12–15.9)
IMM GRANULOCYTES # BLD AUTO: 0.08 10*3/MM3 (ref 0–0.05)
IMM GRANULOCYTES NFR BLD AUTO: 0.7 % (ref 0–0.5)
LYMPHOCYTES # BLD AUTO: 1.47 10*3/MM3 (ref 0.7–3.1)
LYMPHOCYTES NFR BLD AUTO: 12.2 % (ref 19.6–45.3)
MCH RBC QN AUTO: 25.4 PG (ref 26.6–33)
MCHC RBC AUTO-ENTMCNC: 33.1 G/DL (ref 31.5–35.7)
MCV RBC AUTO: 76.8 FL (ref 79–97)
MONOCYTES # BLD AUTO: 0.65 10*3/MM3 (ref 0.1–0.9)
MONOCYTES NFR BLD AUTO: 5.4 % (ref 5–12)
NEUTROPHILS # BLD AUTO: 9.6 10*3/MM3 (ref 1.7–7)
NEUTROPHILS NFR BLD AUTO: 80 % (ref 42.7–76)
NRBC BLD AUTO-RTO: 0 /100 WBC (ref 0–0.2)
PLATELET # BLD AUTO: 343 10*3/MM3 (ref 140–450)
PROT UR STRIP-MCNC: NEGATIVE MG/DL
RBC # BLD AUTO: 4.05 10*6/MM3 (ref 3.77–5.28)
WBC # BLD AUTO: 12.01 10*3/MM3 (ref 3.4–10.8)

## 2023-01-26 PROCEDURE — 76816 OB US FOLLOW-UP PER FETUS: CPT

## 2023-01-26 PROCEDURE — 76819 FETAL BIOPHYS PROFIL W/O NST: CPT

## 2023-01-26 PROCEDURE — 76816 OB US FOLLOW-UP PER FETUS: CPT | Performed by: OBSTETRICS & GYNECOLOGY

## 2023-01-26 PROCEDURE — 76819 FETAL BIOPHYS PROFIL W/O NST: CPT | Performed by: OBSTETRICS & GYNECOLOGY

## 2023-01-26 PROCEDURE — 0502F SUBSEQUENT PRENATAL CARE: CPT | Performed by: OBSTETRICS & GYNECOLOGY

## 2023-01-26 NOTE — PROGRESS NOTES
OB FOLLOW UP  CC- Here for care of pregnancy        Ammon Vargas is a 27 y.o.  31w6d patient being seen today for her obstetrical follow up visit. Patient reports frequent nose bleeds. States they started on 23 and she is having 2-3 per day.    Her prenatal care is complicated by (and status) :   Patient Active Problem List   Diagnosis   • Asthma   • Recurrent major depressive disorder, in partial remission (HCC)   • Class 3 severe obesity due to excess calories with body mass index (BMI) of 40.0 to 44.9 in adult (HCC)   • Vitamin D deficiency   • Family history of breast cancer in mother   • Prenatal care, subsequent pregnancy   • Obesity in pregnancy, antepartum   • Maternal anemia in pregnancy, antepartum   • Depression affecting pregnancy, antepartum   • POTS (postural orthostatic tachycardia syndrome)   • Inappropriate sinus node tachycardia   • Elevated glucose tolerance test       Flu Status: Declines  TDAP status: received at last visit  28 week labs: Reviewed and Labs show anemia. She is taking additional iron supplement.  Ultrasound Today: Yes at PDC  Non Stress Test: No.    ROS -    Patient Reports : Contractions, Nose bleeds   Patient Denies: Loss of Fluid, Vaginal Spotting, Vision Changes, Headaches, Nausea , Vomiting  and Epigastric pain  Fetal Movement : normal  All other systems reviewed and are negative.       The additional following portions of the patient's history were reviewed and updated as appropriate: allergies, current medications, past family history, past medical history, past social history, past surgical history and problem list.    I have reviewed and agree with the HPI, ROS, and historical information as entered above. Paola Call MD    /70   Wt (!) 149 kg (328 lb)   LMP 2022   Breastfeeding Unknown   BMI 45.75 kg/m²       EXAM:     Prenatal Vitals  BP: 112/70  Weight: (!) 149 kg (328 lb)   Fetal Heart Rate: + usg                Urine Glucose Read-only: Negative  Urine Protein Read-only: Negative       Assessment and Plan    Problem List Items Addressed This Visit        Endocrine and Metabolic    Elevated glucose tolerance test    Overview     145, recommend 3-hour testing-failed 1 out of 4            Gravid and     Prenatal care, subsequent pregnancy - Primary    Relevant Orders    POC Urinalysis Dipstick (Completed)    Obesity in pregnancy, antepartum    Overview     Advise 15 to 20 pound weight gain in pregnancy.  We will get early Glucola at 12 weeks.  Advise baby aspirin on weeks 12 through 36  PDC to perform anatomy ultrasound         Depression affecting pregnancy, antepartum    Relevant Medications    citalopram (CeleXA) 20 MG tablet       Hematology and Neoplasia    Maternal anemia in pregnancy, antepartum    Overview     On iron supplementation.         Relevant Medications    Ferrous Sulfate (IRON PO)   Other Visit Diagnoses     Bleeding nose        Relevant Orders    CBC & Differential          1. Pregnancy at 31w6d  2. Fetal status reassuring.  3. We will get a CBC to check platelets for her nosebleeding.  We also discussed a humidifier.  Avoidance of steroid inhaling such as Flonase.  We also discussed saline nose drops to help.  4. 28 week labs reviewed.    5. Activity and Exercise discussed.  6. Fetal movement/PTL or Labor precautions  Return in about 2 weeks (around 2023).    Paola Call MD  2023

## 2023-01-27 NOTE — PROGRESS NOTES
Patient seen in Maternal Fetal Medicine clinic today. Please see full note in under imaging tab of patient chart in Epic (Viewpoint report).    Rowena Peralta MD

## 2023-02-06 RX ORDER — CETIRIZINE HYDROCHLORIDE 10 MG/1
TABLET ORAL
Qty: 90 TABLET | Refills: 1 | Status: SHIPPED | OUTPATIENT
Start: 2023-02-06 | End: 2023-03-22

## 2023-02-06 NOTE — TELEPHONE ENCOUNTER
Rx Refill Note  Requested Prescriptions     Pending Prescriptions Disp Refills   • cetirizine (zyrTEC) 10 MG tablet [Pharmacy Med Name: CETIRIZINE HCL 10 MG TABLET] 90 tablet 0     Sig: TAKE ONE TABLET BY MOUTH DAILY      Last office visit with prescribing clinician: 9/15/2022   Last telemedicine visit with prescribing clinician: Visit date not found   Next office visit with prescribing clinician: 9/18/2023                         Would you like a call back once the refill request has been completed: [] Yes [] No    If the office needs to give you a call back, can they leave a voicemail: [] Yes [] No    Epi Grijalva MA  02/06/23, 14:05 EST

## 2023-02-08 ENCOUNTER — TELEPHONE (OUTPATIENT)
Dept: OBSTETRICS AND GYNECOLOGY | Facility: CLINIC | Age: 28
End: 2023-02-08

## 2023-02-08 ENCOUNTER — ROUTINE PRENATAL (OUTPATIENT)
Dept: OBSTETRICS AND GYNECOLOGY | Facility: CLINIC | Age: 28
End: 2023-02-08
Payer: COMMERCIAL

## 2023-02-08 VITALS — BODY MASS INDEX: 45.47 KG/M2 | DIASTOLIC BLOOD PRESSURE: 80 MMHG | WEIGHT: 293 LBS | SYSTOLIC BLOOD PRESSURE: 114 MMHG

## 2023-02-08 DIAGNOSIS — Z34.80 PRENATAL CARE OF MULTIGRAVIDA, ANTEPARTUM: ICD-10-CM

## 2023-02-08 DIAGNOSIS — O99.019 MATERNAL ANEMIA IN PREGNANCY, ANTEPARTUM: ICD-10-CM

## 2023-02-08 DIAGNOSIS — R73.09 ELEVATED GLUCOSE TOLERANCE TEST: Primary | ICD-10-CM

## 2023-02-08 DIAGNOSIS — O99.210 OBESITY IN PREGNANCY, ANTEPARTUM: ICD-10-CM

## 2023-02-08 DIAGNOSIS — O99.340 DEPRESSION AFFECTING PREGNANCY, ANTEPARTUM: ICD-10-CM

## 2023-02-08 DIAGNOSIS — F32.A DEPRESSION AFFECTING PREGNANCY, ANTEPARTUM: ICD-10-CM

## 2023-02-08 LAB
EXPIRATION DATE: 0
GLUCOSE UR STRIP-MCNC: NEGATIVE MG/DL
Lab: 0
PROT UR STRIP-MCNC: ABNORMAL MG/DL

## 2023-02-08 PROCEDURE — 0502F SUBSEQUENT PRENATAL CARE: CPT | Performed by: OBSTETRICS & GYNECOLOGY

## 2023-02-08 NOTE — TELEPHONE ENCOUNTER
Patient handed me spouses FMLA upon checking out, I gave to liana with a sticky note of her MRN and also noted she did not pay the @ 25 today

## 2023-02-08 NOTE — PROGRESS NOTES
OB FOLLOW UP  CC- Here for care of pregnancy        Ammon Vargas is a 27 y.o.  33w5d patient being seen today for her obstetrical follow up visit. Patient reports intermittent irregular BH contractions. Pt states she just recovered from stomach flu and is feeling much better overall.     Her prenatal care is complicated by (and status) :   Patient Active Problem List   Diagnosis   • Asthma   • Recurrent major depressive disorder, in partial remission (HCC)   • Class 3 severe obesity due to excess calories with body mass index (BMI) of 40.0 to 44.9 in adult (HCC)   • Vitamin D deficiency   • Family history of breast cancer in mother   • Prenatal care, subsequent pregnancy   • Obesity in pregnancy, antepartum   • Maternal anemia in pregnancy, antepartum   • Depression affecting pregnancy, antepartum   • POTS (postural orthostatic tachycardia syndrome)   • Inappropriate sinus node tachycardia   • Elevated glucose tolerance test       Flu Status: Declines  Ultrasound Today: No  Non Stress Test: No.      ROS -   Patient Reports : Contractions  Patient Denies: Loss of Fluid, Vaginal Spotting, Vision Changes, Headaches, Nausea , Vomiting  and Epigastric pain  Fetal Movement : normal  All other systems reviewed and are negative.       The additional following portions of the patient's history were reviewed and updated as appropriate: allergies and current medications.    I have reviewed and agree with the HPI, ROS, and historical information as entered above. Paola Call MD    /80   Wt (!) 148 kg (326 lb)   LMP 2022   BMI 45.47 kg/m²       EXAM:     Prenatal Vitals  BP: 114/80  Weight: (!) 148 kg (326 lb)   Fetal Heart Rate: 150      Fundal Height (cm): 38 cm        Urine Glucose Read-only: Negative  Urine Protein Read-only: (!) Trace       Assessment and Plan    Problem List Items Addressed This Visit        Endocrine and Metabolic    Elevated glucose tolerance test - Primary     Overview     145, recommend 3-hour testing-failed 1 out of 4            Gravid and     Prenatal care, subsequent pregnancy    Relevant Orders    POC Glucose, Urine, Qualitative, Dipstick (Completed)    POC Protein, Urine, Qualitative, Dipstick (Completed)    Obesity in pregnancy, antepartum    Overview     Advise 15 to 20 pound weight gain in pregnancy.  We will get early Glucola at 12 weeks.  Advise baby aspirin on weeks 12 through 36  We will get ultrasound at 37 weeks for growth.         Depression affecting pregnancy, antepartum    Relevant Medications    citalopram (CeleXA) 20 MG tablet       Hematology and Neoplasia    Maternal anemia in pregnancy, antepartum    Overview     On iron supplementation.         Relevant Medications    Ferrous Sulfate (IRON PO)       1. Pregnancy at 33w5d  2. Fetal status reassuring.   3. Activity and Exercise discussed.  4. Fetal movement/PTL or Labor precautions  5. GBS next visit  Return in about 2 weeks (around 2023).    Paola Call MD  2023

## 2023-02-17 ENCOUNTER — ROUTINE PRENATAL (OUTPATIENT)
Dept: OBSTETRICS AND GYNECOLOGY | Facility: CLINIC | Age: 28
End: 2023-02-17
Payer: COMMERCIAL

## 2023-02-17 ENCOUNTER — TELEPHONE (OUTPATIENT)
Dept: OBSTETRICS AND GYNECOLOGY | Facility: CLINIC | Age: 28
End: 2023-02-17
Payer: COMMERCIAL

## 2023-02-17 VITALS — BODY MASS INDEX: 46.17 KG/M2 | WEIGHT: 293 LBS | DIASTOLIC BLOOD PRESSURE: 78 MMHG | SYSTOLIC BLOOD PRESSURE: 116 MMHG

## 2023-02-17 DIAGNOSIS — N89.8 VAGINAL DISCHARGE: ICD-10-CM

## 2023-02-17 DIAGNOSIS — O99.210 OBESITY IN PREGNANCY, ANTEPARTUM: Primary | ICD-10-CM

## 2023-02-17 DIAGNOSIS — Z34.93 PRENATAL CARE IN THIRD TRIMESTER: ICD-10-CM

## 2023-02-17 LAB
GLUCOSE UR STRIP-MCNC: NEGATIVE MG/DL
PROT UR STRIP-MCNC: ABNORMAL MG/DL

## 2023-02-17 PROCEDURE — 0502F SUBSEQUENT PRENATAL CARE: CPT | Performed by: NURSE PRACTITIONER

## 2023-02-17 NOTE — PROGRESS NOTES
OB FOLLOW UP  CC- Here for care of pregnancy        Ammon Varags is a 27 y.o.  35w0d patient being seen today for leaking of fluid. She has noticed a constant wetness in her underwear. It began last night at 11pm and it is clear in color. Patient denies any burning with urination, urinary urgency, frequency, or odor. She also reports some lower abdominal cramping, lower back pain, and irregular contractions.   No bleeding; good FM    Her prenatal care is complicated by (and status) :    Patient Active Problem List   Diagnosis   • Asthma   • Recurrent major depressive disorder, in partial remission (HCC)   • Class 3 severe obesity due to excess calories with body mass index (BMI) of 40.0 to 44.9 in adult (HCC)   • Vitamin D deficiency   • Family history of breast cancer in mother   • Prenatal care, subsequent pregnancy   • Obesity in pregnancy, antepartum   • Maternal anemia in pregnancy, antepartum   • Depression affecting pregnancy, antepartum   • POTS (postural orthostatic tachycardia syndrome)   • Inappropriate sinus node tachycardia   • Elevated glucose tolerance test         Ultrasound Today: No.    ROS -   Patient Reports : see above  Patient Denies: Vaginal Spotting, Vision Changes, Headaches, Nausea , Vomiting  and Epigastric pain  Fetal Movement : normal  All other systems reviewed and are negative.       The additional following portions of the patient's history were reviewed and updated as appropriate: allergies and current medications.    I have reviewed and agree with the HPI, ROS, and historical information as entered above. Mary Jane Us, APRN    /78   Wt (!) 150 kg (331 lb)   LMP 2022   BMI 46.17 kg/m²       EXAM:     Prenatal Vitals  BP: 116/78  Weight: (!) 150 kg (331 lb)   Fetal Heart Rate: +       Neg pooling, Valsalva, nitrazine.    Cervix closed.         Assessment and Plan    Problem List Items Addressed This Visit        Gravid and     Obesity in  pregnancy, antepartum - Primary    Overview     Advise 15 to 20 pound weight gain in pregnancy.  We will get early Glucola at 12 weeks.  Advise baby aspirin on weeks 12 through 36  We will get ultrasound at 37 weeks for growth.        Other Visit Diagnoses     Prenatal care in third trimester        Relevant Orders    POC Urinalysis Dipstick (Completed)    US Ob Limited 1 + Fetuses    Vaginal discharge              1. Pregnancy at 35w0d  2. Fetal status reassuring.   3. RTC for worsening symptoms  Return if symptoms worsen or fail to improve, for Next scheduled follow up.   No ROM.  CRISTINE wnl.    Mary Jane Us, APRN  02/17/2023

## 2023-02-17 NOTE — TELEPHONE ENCOUNTER
PATIENT STATES SHE IS 35 WEEKS AND IS LEAKING FLUIDS. WOULD LIKE A CALL BACK TO DISCUSS.    CALL BACK [phone]

## 2023-02-17 NOTE — TELEPHONE ENCOUNTER
"Oneyda pt. 35w 0d. Pt states she went to the bathroom at midnight last night and her pants were wet. States she emptied her bladder, put on a pad and went back to bed. When she woke up this morning the pad was wet. States it is not soaked but \"pretty wet\". Also reports a mild cramping that started last night as well. Reports good fetal movement. Denies any vaginal bleeding. Pt scheduled at 2:45pm with Mary Jane. Pt VU.  "

## 2023-02-24 ENCOUNTER — ROUTINE PRENATAL (OUTPATIENT)
Dept: OBSTETRICS AND GYNECOLOGY | Facility: CLINIC | Age: 28
End: 2023-02-24
Payer: COMMERCIAL

## 2023-02-24 ENCOUNTER — LAB (OUTPATIENT)
Dept: LAB | Facility: HOSPITAL | Age: 28
End: 2023-02-24
Payer: COMMERCIAL

## 2023-02-24 VITALS — WEIGHT: 293 LBS | BODY MASS INDEX: 46.61 KG/M2 | SYSTOLIC BLOOD PRESSURE: 122 MMHG | DIASTOLIC BLOOD PRESSURE: 84 MMHG

## 2023-02-24 DIAGNOSIS — F32.A DEPRESSION AFFECTING PREGNANCY, ANTEPARTUM: ICD-10-CM

## 2023-02-24 DIAGNOSIS — O99.019 MATERNAL ANEMIA IN PREGNANCY, ANTEPARTUM: ICD-10-CM

## 2023-02-24 DIAGNOSIS — R73.09 ELEVATED GLUCOSE TOLERANCE TEST: ICD-10-CM

## 2023-02-24 DIAGNOSIS — O99.210 OBESITY IN PREGNANCY, ANTEPARTUM: ICD-10-CM

## 2023-02-24 DIAGNOSIS — O99.340 DEPRESSION AFFECTING PREGNANCY, ANTEPARTUM: ICD-10-CM

## 2023-02-24 DIAGNOSIS — Z34.83 PRENATAL CARE, SUBSEQUENT PREGNANCY IN THIRD TRIMESTER: ICD-10-CM

## 2023-02-24 DIAGNOSIS — G90.A POTS (POSTURAL ORTHOSTATIC TACHYCARDIA SYNDROME): Primary | ICD-10-CM

## 2023-02-24 DIAGNOSIS — Z34.93 PRENATAL CARE IN THIRD TRIMESTER: ICD-10-CM

## 2023-02-24 LAB
GLUCOSE UR STRIP-MCNC: NEGATIVE MG/DL
PROT UR STRIP-MCNC: ABNORMAL MG/DL

## 2023-02-24 PROCEDURE — 0502F SUBSEQUENT PRENATAL CARE: CPT | Performed by: OBSTETRICS & GYNECOLOGY

## 2023-02-24 PROCEDURE — 87081 CULTURE SCREEN ONLY: CPT

## 2023-02-24 NOTE — PROGRESS NOTES
OB FOLLOW UP  CC- Here for care of pregnancy        Ammon Vargas is a 27 y.o.  36w0d patient being seen today for her obstetrical follow up visit. Patient reports occasional tari ray and swelling in lower extremities without pitting.  Patient also reports cramping and low back pain.     Her prenatal care is complicated by (and status) : None  Patient Active Problem List   Diagnosis   • Asthma   • Recurrent major depressive disorder, in partial remission (HCC)   • Class 3 severe obesity due to excess calories with body mass index (BMI) of 40.0 to 44.9 in adult ([facility])   • Vitamin D deficiency   • Family history of breast cancer in mother   • Prenatal care, subsequent pregnancy   • Obesity in pregnancy, antepartum   • Maternal anemia in pregnancy, antepartum   • Depression affecting pregnancy, antepartum   • POTS (postural orthostatic tachycardia syndrome)   • Inappropriate sinus node tachycardia   • Elevated glucose tolerance test       GBS Status: Done Today. She is not allergic to PCN.    Allergies   Allergen Reactions   • Banana Anaphylaxis   • Cantaloupe (Diagnostic) Hives   • Eggs Or Egg-Derived Products Hives, Swelling and Angioedema   • Influenza Virus Vaccine Other (See Comments)     Unknown but has an allergy to eggs   • Mushroom Hives   • Shellfish-Derived Products Hives   • Rizatriptan Rash          Flu Status: Declines  Her Delivery Plan is: Undecided    US today: No  Non Stress Test: No.      ROS -   Patient Reports : BH, Cramping, Swelling, and Back pain   Patient Denies: Loss of Fluid, Vaginal Spotting, Vision Changes, Headaches, Contractions and Epigastric pain  Fetal Movement : normal  All other systems reviewed and are negative.       The additional following portions of the patient's history were reviewed and updated as appropriate: allergies, current medications, past family history, past medical history, past social history, past surgical history and problem list.    I have  reviewed and agree with the HPI, ROS, and historical information as entered above. Paola Call MD      EXAM:     Prenatal Vitals  BP: 122/84  Weight: (!) 152 kg (334 lb 3.2 oz)   Fetal Heart Rate: 163       Dilation/Effacement/Station  Dilation: Closed  Effacement (%): 0  Station: -4      Urine Glucose Read-only: Negative  Urine Protein Read-only: (!) Trace       Assessment and Plan    Problem List Items Addressed This Visit        Cardiac and Vasculature    POTS (postural orthostatic tachycardia syndrome) - Primary       Endocrine and Metabolic    Elevated glucose tolerance test    Overview     145, recommend 3-hour testing-failed 1 out of 4            Gravid and     Prenatal care, subsequent pregnancy    Relevant Orders    POC Urinalysis Dipstick (Completed)    Obesity in pregnancy, antepartum    Overview     Advise 15 to 20 pound weight gain in pregnancy.  We will get early Glucola at 12 weeks.  Advise baby aspirin on weeks 12 through 36  We will get ultrasound at 37 weeks for growth.         Relevant Orders    US Ob Follow Up Transabdominal Approach    US Fetal Biophysical Profile;Without Non-Stress Testing    Depression affecting pregnancy, antepartum    Relevant Medications    citalopram (CeleXA) 20 MG tablet       Hematology and Neoplasia    Maternal anemia in pregnancy, antepartum    Overview     On iron supplementation.         Relevant Medications    Ferrous Sulfate (IRON PO)   Other Visit Diagnoses     Prenatal care in third trimester        Relevant Orders    Group B Streptococcus Culture - Swab, Vaginal/Rectum          1. Pregnancy at 36w0d  2. Fetal status reassuring.   3. Reviewed Pre-eclampsia signs/symptoms  4. Delivery options reviewed with patient  5. Signs of labor reviewed  6. Kick counts reviewed  7. Activity and Exercise discussed.  Return in about 1 week (around 3/3/2023) for ultrasound.    Paola Call MD  2023

## 2023-02-27 LAB — BACTERIA SPEC AEROBE CULT: NORMAL

## 2023-03-01 NOTE — PROGRESS NOTES
Initial ob visit     CC- Here for care of pregnancy        Ammon Vargas is a 27 y.o. female, , who presents for her first obstetrical visit.  Her last LMP was Patient's last menstrual period was 2022..    OB History    Para Term  AB Living   3 1 1 0 1 1   SAB IAB Ectopic Molar Multiple Live Births   0 0 1 0 0 1      # Outcome Date GA Lbr Chi/2nd Weight Sex Delivery Anes PTL Lv   3 Current            2 Term 10/31/19 39w2d 06:54 / 06:22 3070 g (6 lb 12.3 oz) F Vag-Spont EPI N MEREDITH      Birth Comments: caput   1 Ectopic 18 7w0d    ECTOPIC         Obstetric Comments   FOB #1-G1,2       Initial positive test date : 2022, UPT          Prior obstetric issues: patient with POTS-followed by cardiology, h/o D&C 2018 MAB  Patient's past medical history is significant for: POTS, eosinophilic esophagitis, GERD, migraines, fibromyalgia, CRPS (complex edd pain syndrome), depression/anxiety.  Family history of genetic issues (includes FOB): patients brother with aspergers  Prior infections concerning in pregnancy (Rash, fever in last 2 weeks): No  Varicella Hx - vaccinated  Prior testing for Cystic Fibrosis Carrier or Sickle Cell Trait- none  Prepregnancy BMI - Body mass index is 44.79 kg/m².  History of STD: no  Hx of HSV for patient or partner: no  Ultrasound Today: yes      Additional Pertinent History   Last Pap : 2021 Result: negative HPV: not done     Last Completed Pap Smear          Ordered - PAP SMEAR (Every 3 Years) Ordered on 2021  SCANNED - PAP SMEAR    2019  Done - normal    2018  SCANNED - PAP SMEAR    2018  Done              History of abnormal Pap smear: no  Family history of uterine, colon, breast, or ovarian cancer: yes - mother dx breast CA age 55  Feelings of Anxiety or Depression: yes - depression-states stopped celexa with + upt-asking to discuss options  Tobacco Usage?: Yes Ammon Vargas  reports that she has never  1. \"Have you been to the ER, urgent care clinic since your last visit? Hospitalized since your last visit? \" No    2. \"Have you seen or consulted any other health care providers outside of the [address] [place] [address] since your last visit? \" No     3. For patients aged 39-70: Has the patient had a colonoscopy / FIT/ Cologuard? Yes - no Care Gap present     If the patient is female:    4. For patients aged 41-77: Has the patient had a mammogram within the past 2 years? No    5. For patients aged 21-65: Has the patient had a pap smear?  No smoked. She has never used smokeless tobacco.. Patient stopped vape with + upt.          Alcohol/Drug Use?: NO  Over the age of 35 at delivery: no  Desires Genetic Screening: discuss options  Flu Status: patient cannot have due to egg allergy    [facility]    Current Outpatient Medications:   •  acetaminophen (TYLENOL) 500 MG tablet, Take 500 mg by mouth Every 6 (Six) Hours As Needed for Mild Pain ., Disp: , Rfl:   •  albuterol sulfate HFA (PROAIR HFA) 108 (90 Base) MCG/ACT inhaler, Inhale 1 puff Every 4 (Four) Hours As Needed for Wheezing., Disp: 18 g, Rfl: 0  •  bisoprolol (ZEBeta) 5 MG tablet, TAKE ONE TABLET BY MOUTH TWICE A DAY, Disp: 60 tablet, Rfl: 6  •  dexlansoprazole (DEXILANT) 60 MG capsule, TAKE ONE CAPSULE BY MOUTH DAILY, Disp: 30 capsule, Rfl: 0  •  Droxidopa (Northera) 200 MG capsule, Take 200 mg by mouth 3 (Three) Times a Day. NORTHERA. BRAND NAME ONLY. TAKE LAST DOSE  3 HOURS BEFORE BEDTIME, TAKE WITH 300MG TO EQUAL 500 MG 3 times daily, Disp: 270 capsule, Rfl: 3  •  droxidopa (Northera) 300 MG capsule capsule, Take 1 capsule by mouth 3 (Three) Times a Day. NORTHERA BRAND NAME ONLY! TAKE LAST DOSE 3 HOURS BEFORE BEDTIME. TAKE WITH 200MG TO EQUAL 500 MG 3 times DAILY, Disp: 270 capsule, Rfl: 3  •  EPINEPHrine (EpiPen 2-Florencio) 0.3 MG/0.3ML solution auto-injector injection, Inject 0.3 mL into the appropriate muscle as directed by prescriber 1 (One) Time As Needed (anaphylaxis) for up to 1 dose., Disp: 2 each, Rfl: 0  •  fludrocortisone 0.1 MG tablet, TAKE TWO TABLETS BY MOUTH DAILY WITH BREAKFAST, Disp: 60 tablet, Rfl: 6  •  fluticasone (FLONASE) 50 MCG/ACT nasal spray, Use 2 sprays in each nostril as directed by provider Daily., Disp: 16 g, Rfl: 3  •  ondansetron ODT (ZOFRAN-ODT) 4 MG disintegrating tablet, Place 1 tablet on the tongue Every 6 (Six) Hours As Needed for Nausea or Vomiting., Disp: 30 tablet, Rfl: 0  •  prenatal vitamin (prenatal, CLASSIC, vitamin) tablet, Take  by mouth Daily., Disp: , Rfl:   •   acetaminophen (TYLENOL) 500 MG tablet, Take 500 mg by mouth Every 6 (Six) Hours As Needed for Mild Pain  or Headache., Disp: , Rfl:   •  cetirizine (zyrTEC) 10 MG tablet, TAKE ONE TABLET BY MOUTH DAILY, Disp: 30 tablet, Rfl: 0  •  citalopram (CeleXA) 20 MG tablet, TAKE ONE TABLET BY MOUTH EVERY NIGHT AT BEDTIME, Disp: 90 tablet, Rfl: 1  •  ibuprofen (ADVIL,MOTRIN) 600 MG tablet, Take 1 tablet by mouth Every 6 (Six) Hours As Needed for Mild Pain ., Disp: 30 tablet, Rfl: 0  •  IRON PO, Take 1 tablet by mouth., Disp: , Rfl:   •  vitamin D (ERGOCALCIFEROL) 1.25 MG (82504 UT) capsule capsule, Take 1 capsule by mouth 1 (One) Time Per Week., Disp: 12 capsule, Rfl: 0     Past Medical History:   Diagnosis Date   • Asthma    • Autonomic failure    • CRPS (complex regional pain syndrome type I)    • Eosinophilic esophagitis    • Fibromyalgia    • Fibromyalgia    • GERD (gastroesophageal reflux disease)    • Migraine    • Migraines    • MRSA carrier    • POTS (postural orthostatic tachycardia syndrome)    • POTS (postural orthostatic tachycardia syndrome)    • Pure autonomic failure         Past Surgical History:   Procedure Laterality Date   • ADENOIDECTOMY     • CHOLECYSTECTOMY     • EAR TUBES     • TONSILLECTOMY AND ADENOIDECTOMY         Review of Systems   Review of Systems  Patient Reports: Nausea and vomiting and dizziness  Patient Denies: Spotting  All systems reviewed and otherwise normal.    I have reviewed and agree with the HPI, ROS, and historical information as entered above. Paola Call MD    /82   Wt (!) 146 kg (321 lb)   LMP 06/17/2022   BMI 44.79 kg/m²     The additional following portions of the patient's history were reviewed and updated as appropriate: allergies, current medications, past family history, past medical history, past social history and past surgical history.    Physical Exam  General:  well developed; well nourished  no acute distress   Chest/Respiratory: No labored  breathing, normal respiratory effort, normal appearance, no respiratory noises noted   Heart:  normal rate, regular rhythm,  no murmurs, rubs, or gallops   Thyroid: normal to inspection and palpation   Breasts:  Not performed.   Abdomen: soft, non-tender; no masses  no umbilical or inguinal hernias are present  no hepato-splenomegaly   Pelvis: Not performed.        Assessment and Plan    Problem List Items Addressed This Visit        Cardiac and Vasculature    POTS (postural orthostatic tachycardia syndrome) (Chronic)    Overview     -Follows with Dr. Moran         Relevant Orders    Comprehensive Metabolic Panel    Ambulatory MFM Referral to PDC       Gravid and     Pregnancy - Primary    Relevant Orders    LIQUID-BASED PAP SMEAR, [facility] ([facility])    Obstetric Panel    HIV-1 / O / 2 Ag / Antibody 4th Generation    Urinalysis With Microscopic - Urine, Clean Catch    Urine Culture - Urine, Urine, Clean Catch    Urine Drug Screen - Urine, Clean Catch    Obesity in pregnancy, antepartum    Overview     Advise 15 to 20 pound weight gain in pregnancy.  We will get early Glucola at 12 weeks.  Advise baby aspirin on weeks 12 through 36  PDC to perform anatomy ultrasound         Relevant Orders    Hemoglobin A1c    Ambulatory MFM Referral to PDC       Musculoskeletal and Injuries    Fibromyalgia      Other Visit Diagnoses     Screening for STD (sexually transmitted disease)        Relevant Orders    LIQUID-BASED PAP SMEAR, [facility] ([facility])          1. Pregnancy at 7w4d  2. Reviewed routine prenatal care with the office and educational materials given  3. Lab(s) Ordered  4. Discussed options for genetic testing including first trimester nuchal translucency screen, genetic disease carrier testing, quadruple screen, and [place].  5. Nausea/Vomiting - she does not desire medications at this time.  Discussed conservative ways to help with nausea.  6. Patient is on Prenatal vitamins  7. Activity  recommendation : 150 minutes/week of moderate intensity aerobic activity unless we limit for bleeding, hypertension or other pregnancy complication   8. Recommend limiting weight gain to 15 to 20 pounds in pregnancy.   9. Discussed carbohydrate control.   10. Referral to pdc Ordered  11. Recommended early 1 hr gtt next visit  12. discussed baby aspirin from 10-36 weeks for prevention of preeclampsia   13. baseline PEP today  Return in about 4 weeks (around 9/6/2022) for glucola, PDC same day.      Paola Call MD  08/09/2022

## 2023-03-02 ENCOUNTER — ROUTINE PRENATAL (OUTPATIENT)
Dept: OBSTETRICS AND GYNECOLOGY | Facility: CLINIC | Age: 28
End: 2023-03-02
Payer: COMMERCIAL

## 2023-03-02 VITALS — DIASTOLIC BLOOD PRESSURE: 82 MMHG | BODY MASS INDEX: 46.42 KG/M2 | SYSTOLIC BLOOD PRESSURE: 120 MMHG | WEIGHT: 293 LBS

## 2023-03-02 DIAGNOSIS — R73.09 ELEVATED GLUCOSE TOLERANCE TEST: ICD-10-CM

## 2023-03-02 DIAGNOSIS — Z34.90 PRENATAL CARE, ANTEPARTUM: ICD-10-CM

## 2023-03-02 DIAGNOSIS — Z34.83 PRENATAL CARE, SUBSEQUENT PREGNANCY IN THIRD TRIMESTER: ICD-10-CM

## 2023-03-02 DIAGNOSIS — O99.210 OBESITY IN PREGNANCY, ANTEPARTUM: ICD-10-CM

## 2023-03-02 DIAGNOSIS — O99.019 MATERNAL ANEMIA IN PREGNANCY, ANTEPARTUM: Primary | ICD-10-CM

## 2023-03-02 LAB
EXPIRATION DATE: 0
GLUCOSE UR STRIP-MCNC: NEGATIVE MG/DL
Lab: 0
PROT UR STRIP-MCNC: ABNORMAL MG/DL

## 2023-03-02 PROCEDURE — 0502F SUBSEQUENT PRENATAL CARE: CPT | Performed by: OBSTETRICS & GYNECOLOGY

## 2023-03-02 NOTE — PROGRESS NOTES
OB FOLLOW UP  CC- Here for care of pregnancy        Ammon Vargas is a 27 y.o.  36w6d patient being seen today for her obstetrical follow up visit. Patient reports irregular BH contractions.     Her prenatal care is complicated by (and status) :   Patient Active Problem List   Diagnosis   • Asthma   • Recurrent major depressive disorder, in partial remission (HCC)   • Class 3 severe obesity due to excess calories with body mass index (BMI) of 40.0 to 44.9 in adult (HCC)   • Vitamin D deficiency   • Family history of breast cancer in mother   • Prenatal care, subsequent pregnancy   • Obesity in pregnancy, antepartum   • Maternal anemia in pregnancy, antepartum   • Depression affecting pregnancy, antepartum   • POTS (postural orthostatic tachycardia syndrome)   • Inappropriate sinus node tachycardia   • Elevated glucose tolerance test       GBS Status: was already done and is negative.    Allergies   Allergen Reactions   • Banana Anaphylaxis   • Cantaloupe (Diagnostic) Hives   • Eggs Or Egg-Derived Products Hives, Swelling and Angioedema   • Influenza Virus Vaccine Other (See Comments)     Unknown but has an allergy to eggs   • Mushroom Hives   • Shellfish-Derived Products Hives   • Rizatriptan Rash          Her Delivery Plan is: IOL on 3/19/23    US done today: Yes.  Findings showed Female infant in cephalic presentation with fetal heart rate of 149.  Posterior placenta and normal fluid volume with an CRISTINE of 12.9.  BPP 8 out of 8.  Estimated fetal weight 6 pounds which is 26 percentile..  I have personally evaluated the U/S and agree with the findings.  Non Stress Test: No.      ROS -   Patient Denies: Loss of Fluid, Vaginal Spotting, Vision Changes, Headaches, Nausea , Vomiting  and Epigastric pain  Fetal Movement : normal  All other systems reviewed and are negative.       The additional following portions of the patient's history were reviewed and updated as appropriate: allergies and current  medications.    I have reviewed and agree with the HPI, ROS, and historical information as entered above. Paola Call MD      EXAM:     Prenatal Vitals  BP: 120/82  Weight: (!) 151 kg (332 lb 12.8 oz)   Fetal Heart Rate: 149       Dilation/Effacement/Station  Dilation: 1  Effacement (%): 50      Urine Glucose Read-only: Negative  Urine Protein Read-only: (!) Trace       Assessment and Plan    Problem List Items Addressed This Visit        Endocrine and Metabolic    Elevated glucose tolerance test    Overview     145, recommend 3-hour testing-failed 1 out of 4            Gravid and     Prenatal care, subsequent pregnancy    Overview     IOL 3/19 at 9:30 PM         Obesity in pregnancy, antepartum    Overview     Advise 15 to 20 pound weight gain in pregnancy.  We will get early Glucola at 12 weeks.  Advise baby aspirin on weeks 12 through 36  We will get ultrasound at 37 weeks for growth.            Hematology and Neoplasia    Maternal anemia in pregnancy, antepartum - Primary    Overview     On iron supplementation.         Relevant Medications    Ferrous Sulfate (IRON PO)   Other Visit Diagnoses     Prenatal care, antepartum        Relevant Orders    POC Glucose, Urine, Qualitative, Dipstick (Completed)    POC Protein, Urine, Qualitative, Dipstick (Completed)          1. Pregnancy at 36w6d  2. Fetal status reassuring.   3. Discussed trace protein in urine.  Discussed the importance of increasing water intake.  4. Reviewed Pre-eclampsia signs/symptoms  5. Delivery options reviewed with patient  6. Signs of labor reviewed  7. Kick counts reviewed  8. Activity and Exercise discussed.  Return in about 1 week (around 3/9/2023).    Paola Call MD  2023

## 2023-03-09 ENCOUNTER — ROUTINE PRENATAL (OUTPATIENT)
Dept: OBSTETRICS AND GYNECOLOGY | Facility: CLINIC | Age: 28
End: 2023-03-09
Payer: COMMERCIAL

## 2023-03-09 VITALS — DIASTOLIC BLOOD PRESSURE: 82 MMHG | BODY MASS INDEX: 46.36 KG/M2 | SYSTOLIC BLOOD PRESSURE: 122 MMHG | WEIGHT: 293 LBS

## 2023-03-09 DIAGNOSIS — O99.340 DEPRESSION AFFECTING PREGNANCY, ANTEPARTUM: ICD-10-CM

## 2023-03-09 DIAGNOSIS — Z34.90 PRENATAL CARE, ANTEPARTUM: ICD-10-CM

## 2023-03-09 DIAGNOSIS — Z34.80 PRENATAL CARE OF MULTIGRAVIDA, ANTEPARTUM: ICD-10-CM

## 2023-03-09 DIAGNOSIS — F32.A DEPRESSION AFFECTING PREGNANCY, ANTEPARTUM: ICD-10-CM

## 2023-03-09 DIAGNOSIS — O99.019 MATERNAL ANEMIA IN PREGNANCY, ANTEPARTUM: ICD-10-CM

## 2023-03-09 DIAGNOSIS — G90.A POTS (POSTURAL ORTHOSTATIC TACHYCARDIA SYNDROME): Primary | ICD-10-CM

## 2023-03-09 DIAGNOSIS — O99.210 OBESITY IN PREGNANCY, ANTEPARTUM: ICD-10-CM

## 2023-03-09 LAB
GLUCOSE UR STRIP-MCNC: NEGATIVE MG/DL
PROT UR STRIP-MCNC: NEGATIVE MG/DL

## 2023-03-09 PROCEDURE — 0502F SUBSEQUENT PRENATAL CARE: CPT | Performed by: OBSTETRICS & GYNECOLOGY

## 2023-03-09 NOTE — PROGRESS NOTES
OB FOLLOW UP  CC- Here for care of pregnancy        Ammon Vargas is a 27 y.o.  37w6d patient being seen today for her obstetrical follow up visit. Patient reports BH contractions every 10-15 minutes since yesterday afternoon. Pt reports feeling baby move less but reports 10 movements in 10 hours. She has felt baby move today.     Her prenatal care is complicated by (and status) :   Patient Active Problem List   Diagnosis   • Asthma   • Recurrent major depressive disorder, in partial remission (HCC)   • Class 3 severe obesity due to excess calories with body mass index (BMI) of 40.0 to 44.9 in adult ([facility])   • Vitamin D deficiency   • Family history of breast cancer in mother   • Prenatal care, subsequent pregnancy   • Obesity in pregnancy, antepartum   • Maternal anemia in pregnancy, antepartum   • Depression affecting pregnancy, antepartum   • POTS (postural orthostatic tachycardia syndrome)   • Inappropriate sinus node tachycardia   • Elevated glucose tolerance test       GBS Status:   Group B Strep Culture   Date Value Ref Range Status   2023 No Group B Streptococcus isolated  Final         Allergies   Allergen Reactions   • Banana Anaphylaxis   • Cantaloupe (Diagnostic) Hives   • Eggs Or Egg-Derived Products Hives, Swelling and Angioedema   • Influenza Virus Vaccine Other (See Comments)     Unknown but has an allergy to eggs   • Mushroom Hives   • Shellfish-Derived Products Hives   • Rizatriptan Rash          Her Delivery Plan is: IOL 3/19/23      today: no  Non Stress Test: No.       ROS -   Patient Denies: Loss of Fluid, Vaginal Spotting, Vision Changes, Headaches, Nausea , Vomiting  and Epigastric pain  Fetal Movement : normal  All other systems reviewed and are negative.       The additional following portions of the patient's history were reviewed and updated as appropriate: allergies and current medications.    I have reviewed and agree with the HPI, ROS, and historical information as  entered above. Paola Call MD      EXAM:     Prenatal Vitals  BP: 122/82  Weight: (!) 151 kg (332 lb 6.4 oz)   Fetal Heart Rate: 158              Urine Glucose Read-only: Negative  Urine Protein Read-only: Negative       Assessment and Plan    Problem List Items Addressed This Visit        Cardiac and Vasculature    POTS (postural orthostatic tachycardia syndrome) - Primary       Gravid and     Prenatal care, subsequent pregnancy    Overview     IOL 3/19 at 9:30 PM         Obesity in pregnancy, antepartum    Overview     Advise 15 to 20 pound weight gain in pregnancy.  We will get early Glucola at 12 weeks.  Advise baby aspirin on weeks 12 through 36  We will get ultrasound at 37 weeks for growth.         Depression affecting pregnancy, antepartum    Relevant Medications    citalopram (CeleXA) 20 MG tablet       Hematology and Neoplasia    Maternal anemia in pregnancy, antepartum    Overview     On iron supplementation.         Relevant Medications    Ferrous Sulfate (IRON PO)   Other Visit Diagnoses     Prenatal care, antepartum        Relevant Orders    POC Urinalysis Dipstick (Completed)          1. Pregnancy at 37w6d  2. Fetal status reassuring.   3. Reviewed Pre-eclampsia signs/symptoms  4. Delivery options reviewed with patient  5. Signs of labor reviewed  6. Kick counts reviewed  7. Activity and Exercise discussed.  Return in about 1 week (around 3/16/2023).    Paola Call MD  2023

## 2023-03-16 ENCOUNTER — PREP FOR SURGERY (OUTPATIENT)
Dept: OTHER | Facility: HOSPITAL | Age: 28
End: 2023-03-16
Payer: COMMERCIAL

## 2023-03-16 ENCOUNTER — ROUTINE PRENATAL (OUTPATIENT)
Dept: OBSTETRICS AND GYNECOLOGY | Facility: CLINIC | Age: 28
End: 2023-03-16
Payer: COMMERCIAL

## 2023-03-16 VITALS — BODY MASS INDEX: 45.89 KG/M2 | DIASTOLIC BLOOD PRESSURE: 80 MMHG | SYSTOLIC BLOOD PRESSURE: 124 MMHG | WEIGHT: 293 LBS

## 2023-03-16 DIAGNOSIS — O99.019 MATERNAL ANEMIA IN PREGNANCY, ANTEPARTUM: ICD-10-CM

## 2023-03-16 DIAGNOSIS — R73.09 ELEVATED GLUCOSE TOLERANCE TEST: ICD-10-CM

## 2023-03-16 DIAGNOSIS — O99.210 OBESITY IN PREGNANCY, ANTEPARTUM: ICD-10-CM

## 2023-03-16 DIAGNOSIS — Z34.80 PRENATAL CARE OF MULTIGRAVIDA, ANTEPARTUM: ICD-10-CM

## 2023-03-16 DIAGNOSIS — O99.210 OBESITY IN PREGNANCY, ANTEPARTUM: Primary | ICD-10-CM

## 2023-03-16 DIAGNOSIS — G90.A POTS (POSTURAL ORTHOSTATIC TACHYCARDIA SYNDROME): Primary | ICD-10-CM

## 2023-03-16 LAB
GLUCOSE UR STRIP-MCNC: NEGATIVE MG/DL
PROT UR STRIP-MCNC: ABNORMAL MG/DL

## 2023-03-16 PROCEDURE — 0502F SUBSEQUENT PRENATAL CARE: CPT | Performed by: OBSTETRICS & GYNECOLOGY

## 2023-03-16 RX ORDER — OXYTOCIN/0.9 % SODIUM CHLORIDE 30/500 ML
2-24 PLASTIC BAG, INJECTION (ML) INTRAVENOUS
Status: CANCELLED | OUTPATIENT
Start: 2023-03-16

## 2023-03-16 RX ORDER — CARBOPROST TROMETHAMINE 250 UG/ML
250 INJECTION, SOLUTION INTRAMUSCULAR AS NEEDED
Status: CANCELLED | OUTPATIENT
Start: 2023-03-16

## 2023-03-16 RX ORDER — BUTORPHANOL TARTRATE 1 MG/ML
1 INJECTION, SOLUTION INTRAMUSCULAR; INTRAVENOUS
Status: CANCELLED | OUTPATIENT
Start: 2023-03-16

## 2023-03-16 RX ORDER — MISOPROSTOL 200 UG/1
800 TABLET ORAL AS NEEDED
Status: CANCELLED | OUTPATIENT
Start: 2023-03-16

## 2023-03-16 RX ORDER — SODIUM CHLORIDE, SODIUM LACTATE, POTASSIUM CHLORIDE, CALCIUM CHLORIDE 600; 310; 30; 20 MG/100ML; MG/100ML; MG/100ML; MG/100ML
125 INJECTION, SOLUTION INTRAVENOUS CONTINUOUS
Status: CANCELLED | OUTPATIENT
Start: 2023-03-16

## 2023-03-16 RX ORDER — SODIUM CHLORIDE 0.9 % (FLUSH) 0.9 %
1-10 SYRINGE (ML) INJECTION AS NEEDED
Status: CANCELLED | OUTPATIENT
Start: 2023-03-16

## 2023-03-16 RX ORDER — OXYTOCIN/0.9 % SODIUM CHLORIDE 30/500 ML
999 PLASTIC BAG, INJECTION (ML) INTRAVENOUS ONCE
Status: CANCELLED | OUTPATIENT
Start: 2023-03-16 | End: 2023-03-16

## 2023-03-16 RX ORDER — LIDOCAINE HYDROCHLORIDE 10 MG/ML
5 INJECTION, SOLUTION EPIDURAL; INFILTRATION; INTRACAUDAL; PERINEURAL AS NEEDED
Status: CANCELLED | OUTPATIENT
Start: 2023-03-16

## 2023-03-16 RX ORDER — METHYLERGONOVINE MALEATE 0.2 MG/ML
200 INJECTION INTRAVENOUS ONCE AS NEEDED
Status: CANCELLED | OUTPATIENT
Start: 2023-03-16

## 2023-03-16 RX ORDER — MAGNESIUM CARB/ALUMINUM HYDROX 105-160MG
30 TABLET,CHEWABLE ORAL ONCE
Status: CANCELLED | OUTPATIENT
Start: 2023-03-16 | End: 2023-03-16

## 2023-03-16 RX ORDER — OXYTOCIN/0.9 % SODIUM CHLORIDE 30/500 ML
250 PLASTIC BAG, INJECTION (ML) INTRAVENOUS CONTINUOUS
Status: CANCELLED | OUTPATIENT
Start: 2023-03-16 | End: 2023-03-16

## 2023-03-16 RX ORDER — SODIUM CHLORIDE 0.9 % (FLUSH) 0.9 %
10 SYRINGE (ML) INJECTION EVERY 12 HOURS SCHEDULED
Status: CANCELLED | OUTPATIENT
Start: 2023-03-16

## 2023-03-16 NOTE — PROGRESS NOTES
OB FOLLOW UP  CC- Here for care of pregnancy        Ammon Vargas is a 27 y.o.  38w6d patient being seen today for her obstetrical follow up visit. Patient reports she has been very nauseous this week. Has only vomited once. Also reports irregular contractions.    Her prenatal care is complicated by (and status) :   Patient Active Problem List   Diagnosis   • Asthma   • Recurrent major depressive disorder, in partial remission ([facility])   • Class 3 severe obesity due to excess calories with body mass index (BMI) of 40.0 to 44.9 in adult ([facility])   • Vitamin D deficiency   • Family history of breast cancer in mother   • Prenatal care, subsequent pregnancy   • Obesity in pregnancy, antepartum   • Maternal anemia in pregnancy, antepartum   • Depression affecting pregnancy, antepartum   • POTS (postural orthostatic tachycardia syndrome)   • Inappropriate sinus node tachycardia   • Elevated glucose tolerance test       GBS Status:   Group B Strep Culture   Date Value Ref Range Status   2023 No Group B Streptococcus isolated  Final         Allergies   Allergen Reactions   • Banana Anaphylaxis   • Cantaloupe (Diagnostic) Hives   • Eggs Or Egg-Derived Products Hives, Swelling and Angioedema   • Influenza Virus Vaccine Other (See Comments)     Unknown but has an allergy to eggs   • Mushroom Hives   • Shellfish-Derived Products Hives   • Rizatriptan Rash          Her Delivery Plan is: [facility] IOL at 39wks. Scheduled  3/19/23 at 9:30pm  US today: no  Non Stress Test: No.       ROS -   Patient Reports : Nausea and Contractions  Patient Denies: Loss of Fluid, Vaginal Spotting, Vision Changes, Headaches, Nausea , Vomiting  and Epigastric pain  Fetal Movement : normal  All other systems reviewed and are negative.       The additional following portions of the patient's history were reviewed and updated as appropriate: allergies, current medications, past family history, past medical history, past social history, past  surgical history and problem list.    I have reviewed and agree with the HPI, ROS, and historical information as entered above. Paola Call MD      EXAM:     Prenatal Vitals  BP: 124/80  Weight: (!) 149 kg (329 lb)   Fetal Heart Rate: 166   Fundal Height (cm): 40 cm   Dilation/Effacement/Station  Dilation: 2  Effacement (%): 60      Urine Glucose Read-only: Negative  Urine Protein Read-only: (!) Trace       Assessment and Plan    Problem List Items Addressed This Visit        Cardiac and Vasculature    POTS (postural orthostatic tachycardia syndrome) - Primary       Endocrine and Metabolic    Elevated glucose tolerance test    Overview     145, recommend 3-hour testing-failed 1 out of 4            Gravid and     Prenatal care, subsequent pregnancy    Overview     IOL 3/19 at 9:30 PM         Relevant Orders    POC Urinalysis Dipstick (Completed)    Obesity in pregnancy, antepartum    Overview     Advise 15 to 20 pound weight gain in pregnancy.  We will get early Glucola at 12 weeks.  Advise baby aspirin on weeks 12 through 36  We will get ultrasound at 37 weeks for growth.            Hematology and Neoplasia    Maternal anemia in pregnancy, antepartum    Overview     On iron supplementation.         Relevant Medications    Ferrous Sulfate (IRON PO)       1. Pregnancy at 38w6d  2. Fetal status reassuring.   3. Reviewed Pre-eclampsia signs/symptoms  4. Reviewed upcoming IOL with patient. Instructions given.  5. Delivery options reviewed with patient  6. Signs of labor reviewed  7. Kick counts reviewed  8. Activity and Exercise discussed.  Return in about 6 weeks (around 2023) for PP.    Paola Call MD  2023

## 2023-03-19 ENCOUNTER — HOSPITAL ENCOUNTER (OUTPATIENT)
Dept: LABOR AND DELIVERY | Facility: HOSPITAL | Age: 28
Discharge: HOME | End: 2023-03-19
Payer: COMMERCIAL

## 2023-03-19 ENCOUNTER — HOSPITAL ENCOUNTER (INPATIENT)
Facility: HOSPITAL | Age: 28
LOS: 3 days | Discharge: HOME | End: 2023-03-22
Attending: OBSTETRICS & GYNECOLOGY | Admitting: OBSTETRICS & GYNECOLOGY
Payer: COMMERCIAL

## 2023-03-19 DIAGNOSIS — O99.210 OBESITY IN PREGNANCY, ANTEPARTUM: ICD-10-CM

## 2023-03-19 PROBLEM — Z34.90 PREGNANCY: Status: ACTIVE | Noted: 2023-03-19

## 2023-03-19 LAB
ABO GROUP BLD: NORMAL
AMPHET+METHAMPHET UR QL: NEGATIVE
AMPHETAMINES UR QL: NEGATIVE
BARBITURATES UR QL SCN: NEGATIVE
BASOPHILS # BLD AUTO: 0.03 10*3/MM3 (ref 0–0.2)
BASOPHILS NFR BLD AUTO: 0.2 % (ref 0–1.5)
BENZODIAZ UR QL SCN: NEGATIVE
BLD GP AB SCN SERPL QL: NEGATIVE
BUPRENORPHINE SERPL-MCNC: NEGATIVE NG/ML
CANNABINOIDS SERPL QL: NEGATIVE
COCAINE UR QL: NEGATIVE
DEPRECATED RDW RBC AUTO: 43.6 FL (ref 37–54)
EOSINOPHIL # BLD AUTO: 0.18 10*3/MM3 (ref 0–0.4)
EOSINOPHIL NFR BLD AUTO: 1.3 % (ref 0.3–6.2)
ERYTHROCYTE [DISTWIDTH] IN BLOOD BY AUTOMATED COUNT: 15.4 % (ref 12.3–15.4)
HCT VFR BLD AUTO: 34.8 % (ref 34–46.6)
HGB BLD-MCNC: 11.2 G/DL (ref 12–15.9)
IMM GRANULOCYTES # BLD AUTO: 0.07 10*3/MM3 (ref 0–0.05)
IMM GRANULOCYTES NFR BLD AUTO: 0.5 % (ref 0–0.5)
LYMPHOCYTES # BLD AUTO: 1.82 10*3/MM3 (ref 0.7–3.1)
LYMPHOCYTES NFR BLD AUTO: 12.7 % (ref 19.6–45.3)
MCH RBC QN AUTO: 25 PG (ref 26.6–33)
MCHC RBC AUTO-ENTMCNC: 32.2 G/DL (ref 31.5–35.7)
MCV RBC AUTO: 77.7 FL (ref 79–97)
METHADONE UR QL SCN: NEGATIVE
MONOCYTES # BLD AUTO: 0.8 10*3/MM3 (ref 0.1–0.9)
MONOCYTES NFR BLD AUTO: 5.6 % (ref 5–12)
NEUTROPHILS NFR BLD AUTO: 11.39 10*3/MM3 (ref 1.7–7)
NEUTROPHILS NFR BLD AUTO: 79.7 % (ref 42.7–76)
NRBC BLD AUTO-RTO: 0 /100 WBC (ref 0–0.2)
OPIATES UR QL: NEGATIVE
OXYCODONE UR QL SCN: NEGATIVE
PCP UR QL SCN: NEGATIVE
PLATELET # BLD AUTO: 345 10*3/MM3 (ref 140–450)
PMV BLD AUTO: 9.9 FL (ref 6–12)
PROPOXYPH UR QL: NEGATIVE
RBC # BLD AUTO: 4.48 10*6/MM3 (ref 3.77–5.28)
RH BLD: POSITIVE
T&S EXPIRATION DATE: NORMAL
TRICYCLICS UR QL SCN: NEGATIVE
WBC NRBC COR # BLD: 14.29 10*3/MM3 (ref 3.4–10.8)

## 2023-03-19 PROCEDURE — 86850 RBC ANTIBODY SCREEN: CPT | Performed by: OBSTETRICS & GYNECOLOGY

## 2023-03-19 PROCEDURE — 59200 INSERT CERVICAL DILATOR: CPT | Performed by: OBSTETRICS & GYNECOLOGY

## 2023-03-19 PROCEDURE — 86901 BLOOD TYPING SEROLOGIC RH(D): CPT | Performed by: OBSTETRICS & GYNECOLOGY

## 2023-03-19 PROCEDURE — 3E033VJ INTRODUCTION OF OTHER HORMONE INTO PERIPHERAL VEIN, PERCUTANEOUS APPROACH: ICD-10-PCS | Performed by: OBSTETRICS & GYNECOLOGY

## 2023-03-19 PROCEDURE — 86900 BLOOD TYPING SEROLOGIC ABO: CPT | Performed by: OBSTETRICS & GYNECOLOGY

## 2023-03-19 PROCEDURE — 80306 DRUG TEST PRSMV INSTRMNT: CPT | Performed by: OBSTETRICS & GYNECOLOGY

## 2023-03-19 PROCEDURE — 85025 COMPLETE CBC W/AUTO DIFF WBC: CPT | Performed by: OBSTETRICS & GYNECOLOGY

## 2023-03-19 RX ORDER — LIDOCAINE HYDROCHLORIDE 10 MG/ML
5 INJECTION, SOLUTION EPIDURAL; INFILTRATION; INTRACAUDAL; PERINEURAL AS NEEDED
Status: DISCONTINUED | OUTPATIENT
Start: 2023-03-19 | End: 2023-03-20

## 2023-03-19 RX ORDER — SODIUM CHLORIDE, SODIUM LACTATE, POTASSIUM CHLORIDE, CALCIUM CHLORIDE 600; 310; 30; 20 MG/100ML; MG/100ML; MG/100ML; MG/100ML
125 INJECTION, SOLUTION INTRAVENOUS CONTINUOUS
Status: DISCONTINUED | OUTPATIENT
Start: 2023-03-19 | End: 2023-03-20

## 2023-03-19 RX ORDER — SODIUM CHLORIDE 0.9 % (FLUSH) 0.9 %
10 SYRINGE (ML) INJECTION EVERY 12 HOURS SCHEDULED
Status: DISCONTINUED | OUTPATIENT
Start: 2023-03-19 | End: 2023-03-20

## 2023-03-19 RX ORDER — BUTORPHANOL TARTRATE 1 MG/ML
1 INJECTION, SOLUTION INTRAMUSCULAR; INTRAVENOUS
Status: DISCONTINUED | OUTPATIENT
Start: 2023-03-19 | End: 2023-03-20

## 2023-03-19 RX ORDER — OXYTOCIN/0.9 % SODIUM CHLORIDE 30/500 ML
2-24 PLASTIC BAG, INJECTION (ML) INTRAVENOUS
Status: DISCONTINUED | OUTPATIENT
Start: 2023-03-19 | End: 2023-03-20

## 2023-03-19 RX ORDER — SODIUM CHLORIDE 0.9 % (FLUSH) 0.9 %
1-10 SYRINGE (ML) INJECTION AS NEEDED
Status: DISCONTINUED | OUTPATIENT
Start: 2023-03-19 | End: 2023-03-20

## 2023-03-19 RX ORDER — MAGNESIUM CARB/ALUMINUM HYDROX 105-160MG
30 TABLET,CHEWABLE ORAL ONCE
Status: DISCONTINUED | OUTPATIENT
Start: 2023-03-19 | End: 2023-03-20

## 2023-03-19 RX ADMIN — SODIUM CHLORIDE, POTASSIUM CHLORIDE, SODIUM LACTATE AND CALCIUM CHLORIDE 125 ML/HR: 600; 310; 30; 20 INJECTION, SOLUTION INTRAVENOUS at 22:35

## 2023-03-19 RX ADMIN — Medication 2 MILLI-UNITS/MIN: at 23:24

## 2023-03-20 ENCOUNTER — ANESTHESIA (OUTPATIENT)
Dept: LABOR AND DELIVERY | Facility: HOSPITAL | Age: 28
End: 2023-03-20
Payer: COMMERCIAL

## 2023-03-20 ENCOUNTER — ANESTHESIA EVENT (OUTPATIENT)
Dept: LABOR AND DELIVERY | Facility: HOSPITAL | Age: 28
End: 2023-03-20
Payer: COMMERCIAL

## 2023-03-20 PROCEDURE — 25010000002 BUTORPHANOL PER 1 MG: Performed by: OBSTETRICS & GYNECOLOGY

## 2023-03-20 PROCEDURE — 0KQM0ZZ REPAIR PERINEUM MUSCLE, OPEN APPROACH: ICD-10-PCS | Performed by: OBSTETRICS & GYNECOLOGY

## 2023-03-20 PROCEDURE — 25010000002 ROPIVACAINE PER 1 MG: Performed by: ANESTHESIOLOGY

## 2023-03-20 PROCEDURE — C1755 CATHETER, INTRASPINAL: HCPCS

## 2023-03-20 PROCEDURE — 51703 INSERT BLADDER CATH COMPLEX: CPT

## 2023-03-20 PROCEDURE — 59025 FETAL NON-STRESS TEST: CPT

## 2023-03-20 PROCEDURE — C1755 CATHETER, INTRASPINAL: HCPCS | Performed by: ANESTHESIOLOGY

## 2023-03-20 PROCEDURE — 25010000002 ONDANSETRON PER 1 MG: Performed by: ANESTHESIOLOGY

## 2023-03-20 PROCEDURE — 25010000002 FENTANYL CITRATE (PF) 50 MCG/ML SOLUTION: Performed by: ANESTHESIOLOGY

## 2023-03-20 PROCEDURE — 59400 OBSTETRICAL CARE: CPT | Performed by: OBSTETRICS & GYNECOLOGY

## 2023-03-20 RX ORDER — MISOPROSTOL 200 UG/1
800 TABLET ORAL AS NEEDED
Status: DISCONTINUED | OUTPATIENT
Start: 2023-03-20 | End: 2023-03-22 | Stop reason: HOSPADM

## 2023-03-20 RX ORDER — ACETAMINOPHEN 325 MG/1
650 TABLET ORAL EVERY 6 HOURS PRN
Status: DISCONTINUED | OUTPATIENT
Start: 2023-03-20 | End: 2023-03-22 | Stop reason: HOSPADM

## 2023-03-20 RX ORDER — ROPIVACAINE HYDROCHLORIDE 2 MG/ML
15 INJECTION, SOLUTION EPIDURAL; INFILTRATION; PERINEURAL CONTINUOUS
Status: DISCONTINUED | OUTPATIENT
Start: 2023-03-20 | End: 2023-03-20

## 2023-03-20 RX ORDER — FENTANYL CITRATE 50 UG/ML
INJECTION, SOLUTION INTRAMUSCULAR; INTRAVENOUS AS NEEDED
Status: DISCONTINUED | OUTPATIENT
Start: 2023-03-20 | End: 2023-03-20 | Stop reason: SURG

## 2023-03-20 RX ORDER — DIPHENHYDRAMINE HYDROCHLORIDE 50 MG/ML
12.5 INJECTION INTRAMUSCULAR; INTRAVENOUS EVERY 8 HOURS PRN
Status: DISCONTINUED | OUTPATIENT
Start: 2023-03-20 | End: 2023-03-20

## 2023-03-20 RX ORDER — EPHEDRINE SULFATE 5 MG/ML
10 INJECTION INTRAVENOUS
Status: DISCONTINUED | OUTPATIENT
Start: 2023-03-20 | End: 2023-03-20

## 2023-03-20 RX ORDER — BISACODYL 10 MG
10 SUPPOSITORY, RECTAL RECTAL DAILY PRN
Status: DISCONTINUED | OUTPATIENT
Start: 2023-03-21 | End: 2023-03-22 | Stop reason: HOSPADM

## 2023-03-20 RX ORDER — METOCLOPRAMIDE HYDROCHLORIDE 5 MG/ML
10 INJECTION INTRAMUSCULAR; INTRAVENOUS ONCE AS NEEDED
Status: DISCONTINUED | OUTPATIENT
Start: 2023-03-20 | End: 2023-03-20

## 2023-03-20 RX ORDER — BUPIVACAINE HYDROCHLORIDE 5 MG/ML
INJECTION, SOLUTION EPIDURAL; INTRACAUDAL AS NEEDED
Status: DISCONTINUED | OUTPATIENT
Start: 2023-03-20 | End: 2023-03-20 | Stop reason: SURG

## 2023-03-20 RX ORDER — ONDANSETRON 4 MG/1
4 TABLET, FILM COATED ORAL EVERY 6 HOURS PRN
Status: DISCONTINUED | OUTPATIENT
Start: 2023-03-20 | End: 2023-03-22 | Stop reason: HOSPADM

## 2023-03-20 RX ORDER — MISOPROSTOL 200 UG/1
800 TABLET ORAL AS NEEDED
Status: DISCONTINUED | OUTPATIENT
Start: 2023-03-20 | End: 2023-03-20 | Stop reason: HOSPADM

## 2023-03-20 RX ORDER — HYDROCORTISONE 25 MG/G
1 CREAM TOPICAL AS NEEDED
Status: DISCONTINUED | OUTPATIENT
Start: 2023-03-20 | End: 2023-03-22 | Stop reason: HOSPADM

## 2023-03-20 RX ORDER — CETIRIZINE HYDROCHLORIDE 10 MG/1
10 TABLET ORAL DAILY
Status: DISCONTINUED | OUTPATIENT
Start: 2023-03-20 | End: 2023-03-22 | Stop reason: HOSPADM

## 2023-03-20 RX ORDER — CITALOPRAM 20 MG/1
20 TABLET ORAL DAILY
Status: DISCONTINUED | OUTPATIENT
Start: 2023-03-20 | End: 2023-03-22 | Stop reason: HOSPADM

## 2023-03-20 RX ORDER — OXYTOCIN/0.9 % SODIUM CHLORIDE 30/500 ML
999 PLASTIC BAG, INJECTION (ML) INTRAVENOUS ONCE
Status: DISCONTINUED | OUTPATIENT
Start: 2023-03-20 | End: 2023-03-22 | Stop reason: HOSPADM

## 2023-03-20 RX ORDER — CARBOPROST TROMETHAMINE 250 UG/ML
250 INJECTION, SOLUTION INTRAMUSCULAR AS NEEDED
Status: DISCONTINUED | OUTPATIENT
Start: 2023-03-20 | End: 2023-03-20 | Stop reason: HOSPADM

## 2023-03-20 RX ORDER — TRISODIUM CITRATE DIHYDRATE AND CITRIC ACID MONOHYDRATE 500; 334 MG/5ML; MG/5ML
30 SOLUTION ORAL ONCE
Status: DISCONTINUED | OUTPATIENT
Start: 2023-03-20 | End: 2023-03-20

## 2023-03-20 RX ORDER — DOCUSATE SODIUM 100 MG/1
100 CAPSULE, LIQUID FILLED ORAL 2 TIMES DAILY
Status: DISCONTINUED | OUTPATIENT
Start: 2023-03-20 | End: 2023-03-22 | Stop reason: HOSPADM

## 2023-03-20 RX ORDER — IBUPROFEN 600 MG/1
600 TABLET ORAL EVERY 6 HOURS PRN
Status: DISCONTINUED | OUTPATIENT
Start: 2023-03-20 | End: 2023-03-22 | Stop reason: HOSPADM

## 2023-03-20 RX ORDER — SODIUM CHLORIDE 0.9 % (FLUSH) 0.9 %
1-10 SYRINGE (ML) INJECTION AS NEEDED
Status: DISCONTINUED | OUTPATIENT
Start: 2023-03-20 | End: 2023-03-22 | Stop reason: HOSPADM

## 2023-03-20 RX ORDER — OXYTOCIN/0.9 % SODIUM CHLORIDE 30/500 ML
250 PLASTIC BAG, INJECTION (ML) INTRAVENOUS CONTINUOUS
Status: ACTIVE | OUTPATIENT
Start: 2023-03-20 | End: 2023-03-20

## 2023-03-20 RX ORDER — FLUDROCORTISONE ACETATE 0.1 MG/1
200 TABLET ORAL
Status: DISCONTINUED | OUTPATIENT
Start: 2023-03-21 | End: 2023-03-22 | Stop reason: HOSPADM

## 2023-03-20 RX ORDER — ONDANSETRON 2 MG/ML
4 INJECTION INTRAMUSCULAR; INTRAVENOUS ONCE AS NEEDED
Status: COMPLETED | OUTPATIENT
Start: 2023-03-20 | End: 2023-03-20

## 2023-03-20 RX ORDER — ONDANSETRON 2 MG/ML
4 INJECTION INTRAMUSCULAR; INTRAVENOUS EVERY 6 HOURS PRN
Status: DISCONTINUED | OUTPATIENT
Start: 2023-03-20 | End: 2023-03-22 | Stop reason: HOSPADM

## 2023-03-20 RX ORDER — HYDROCODONE BITARTRATE AND ACETAMINOPHEN 5; 325 MG/1; MG/1
1 TABLET ORAL EVERY 4 HOURS PRN
Status: DISCONTINUED | OUTPATIENT
Start: 2023-03-20 | End: 2023-03-22 | Stop reason: HOSPADM

## 2023-03-20 RX ORDER — DROXIDOPA 200 MG/1
200 CAPSULE ORAL 2 TIMES DAILY
Qty: 180 CAPSULE | Refills: 3 | OUTPATIENT
Start: 2023-03-20 | End: 2023-03-22 | Stop reason: HOSPADM

## 2023-03-20 RX ORDER — FAMOTIDINE 10 MG/ML
20 INJECTION, SOLUTION INTRAVENOUS ONCE AS NEEDED
Status: DISCONTINUED | OUTPATIENT
Start: 2023-03-20 | End: 2023-03-20

## 2023-03-20 RX ORDER — BISOPROLOL FUMARATE 5 MG/1
5 TABLET, FILM COATED ORAL 2 TIMES DAILY
Status: DISCONTINUED | OUTPATIENT
Start: 2023-03-20 | End: 2023-03-22 | Stop reason: HOSPADM

## 2023-03-20 RX ORDER — LIDOCAINE HYDROCHLORIDE AND EPINEPHRINE 15; 5 MG/ML; UG/ML
INJECTION, SOLUTION EPIDURAL AS NEEDED
Status: DISCONTINUED | OUTPATIENT
Start: 2023-03-20 | End: 2023-03-20 | Stop reason: SURG

## 2023-03-20 RX ORDER — METHYLERGONOVINE MALEATE 0.2 MG/ML
200 INJECTION INTRAVENOUS ONCE AS NEEDED
Status: DISCONTINUED | OUTPATIENT
Start: 2023-03-20 | End: 2023-03-20 | Stop reason: HOSPADM

## 2023-03-20 RX ORDER — PROMETHAZINE HYDROCHLORIDE 12.5 MG/1
12.5 TABLET ORAL EVERY 4 HOURS PRN
Status: DISCONTINUED | OUTPATIENT
Start: 2023-03-20 | End: 2023-03-22 | Stop reason: HOSPADM

## 2023-03-20 RX ORDER — HYDROCODONE BITARTRATE AND ACETAMINOPHEN 10; 325 MG/1; MG/1
1 TABLET ORAL EVERY 4 HOURS PRN
Status: DISCONTINUED | OUTPATIENT
Start: 2023-03-20 | End: 2023-03-22 | Stop reason: HOSPADM

## 2023-03-20 RX ADMIN — DOCUSATE SODIUM 100 MG: 100 CAPSULE, LIQUID FILLED ORAL at 20:02

## 2023-03-20 RX ADMIN — Medication: at 13:44

## 2023-03-20 RX ADMIN — SODIUM CHLORIDE, POTASSIUM CHLORIDE, SODIUM LACTATE AND CALCIUM CHLORIDE 125 ML/HR: 600; 310; 30; 20 INJECTION, SOLUTION INTRAVENOUS at 03:58

## 2023-03-20 RX ADMIN — IBUPROFEN 600 MG: 600 TABLET, FILM COATED ORAL at 13:44

## 2023-03-20 RX ADMIN — BUPIVACAINE HYDROCHLORIDE 7 ML: 5 INJECTION, SOLUTION EPIDURAL; INTRACAUDAL; PERINEURAL at 03:25

## 2023-03-20 RX ADMIN — SODIUM CHLORIDE, POTASSIUM CHLORIDE, SODIUM LACTATE AND CALCIUM CHLORIDE 1000 ML: 600; 310; 30; 20 INJECTION, SOLUTION INTRAVENOUS at 02:52

## 2023-03-20 RX ADMIN — BISOPROLOL FUMARATE 5 MG: 5 TABLET, FILM COATED ORAL at 21:54

## 2023-03-20 RX ADMIN — ONDANSETRON 4 MG: 2 INJECTION INTRAMUSCULAR; INTRAVENOUS at 08:00

## 2023-03-20 RX ADMIN — ACETAMINOPHEN 325MG 650 MG: 325 TABLET ORAL at 14:39

## 2023-03-20 RX ADMIN — IBUPROFEN 600 MG: 600 TABLET, FILM COATED ORAL at 20:02

## 2023-03-20 RX ADMIN — SODIUM CHLORIDE, POTASSIUM CHLORIDE, SODIUM LACTATE AND CALCIUM CHLORIDE 125 ML/HR: 600; 310; 30; 20 INJECTION, SOLUTION INTRAVENOUS at 08:00

## 2023-03-20 RX ADMIN — CITALOPRAM HYDROBROMIDE 20 MG: 20 TABLET ORAL at 14:39

## 2023-03-20 RX ADMIN — CETIRIZINE HYDROCHLORIDE 10 MG: 10 TABLET, FILM COATED ORAL at 14:38

## 2023-03-20 RX ADMIN — ROPIVACAINE HYDROCHLORIDE 16 ML: 2 INJECTION, SOLUTION EPIDURAL; INFILTRATION at 03:25

## 2023-03-20 RX ADMIN — WITCH HAZEL 1 PAD: 500 SOLUTION RECTAL; TOPICAL at 13:44

## 2023-03-20 RX ADMIN — ACETAMINOPHEN 325MG 650 MG: 325 TABLET ORAL at 21:53

## 2023-03-20 RX ADMIN — FENTANYL CITRATE 100 MCG: 50 INJECTION, SOLUTION INTRAMUSCULAR; INTRAVENOUS at 03:25

## 2023-03-20 RX ADMIN — BUTORPHANOL TARTRATE 1 MG: 1 INJECTION, SOLUTION INTRAMUSCULAR; INTRAVENOUS at 00:42

## 2023-03-20 RX ADMIN — LIDOCAINE HYDROCHLORIDE AND EPINEPHRINE 3 ML: 15; 5 INJECTION, SOLUTION EPIDURAL at 03:25

## 2023-03-20 NOTE — ANESTHESIA PREPROCEDURE EVALUATION
Anesthesia Evaluation     Patient summary reviewed and Nursing notes reviewed                Airway   Mallampati: I  TM distance: >3 FB  Neck ROM: full  No difficulty expected  Dental - normal exam     Pulmonary - negative pulmonary ROS and normal exam   Cardiovascular - negative cardio ROS and normal exam        Neuro/Psych- negative ROS  GI/Hepatic/Renal/Endo    (+) morbid obesity,      Musculoskeletal (-) negative ROS    Abdominal   (+) obese,     Bowel sounds: normal.   Substance History - negative use     OB/GYN    (+) Pregnant,         Other                      Anesthesia Plan    ASA 3     epidural       Anesthetic plan, risks, benefits, and alternatives have been provided, discussed and informed consent has been obtained with: patient.    Use of blood products discussed with patient .   Plan discussed with attending.        CODE STATUS:

## 2023-03-20 NOTE — H&P
History and Physical:    Subjective     Chief Complaint   Patient presents with   • Scheduled Induction       Ammon Vargas is a 27 y.o. year old  with an Estimated Date of Delivery: 3/24/23 currently at 39w3d presenting with Elective induction of the labor.    Prenatal care has been with Paola Call MD.  It has been significant for Obesity, anemia, POTS.      Review of Systems   Constitutional: Negative.    HENT: Negative.    Respiratory: Negative.    Cardiovascular: Negative.    Gastrointestinal: Negative.    Genitourinary: Negative.    Musculoskeletal: Negative.    Skin: Negative.    Allergic/Immunologic: Negative.    Neurological: Negative.    Hematological: Negative.    Psychiatric/Behavioral: Negative.            Past Medical History:   Diagnosis Date   • Asthma    • CRPS (complex regional pain syndrome type I)    • Eosinophilic esophagitis    • Fibromyalgia    • Internal hemorrhoids 2021   • Migraines    • Morbid obesity with BMI of 45.0-49.9, adult ([facility])    • MRSA carrier    • POTS (postural orthostatic tachycardia syndrome)    • Recurrent major depressive disorder, in partial remission ([facility])      Past Surgical History:   Procedure Laterality Date   • COLONOSCOPY     • D & C WITH SUCTION  2018   • EAR TUBES     • ENDOSCOPY     • LAPAROSCOPIC CHOLECYSTECTOMY     • TONSILLECTOMY AND ADENOIDECTOMY       Family History   Problem Relation Age of Onset   • No Known Problems Father    • Breast cancer Mother    • Diabetes Mother    • Cancer Paternal Grandfather    • Hearing loss Paternal Grandfather    • Heart attack Paternal Grandfather    • Colon cancer Maternal Grandmother    • Cancer Maternal Grandmother    • Diabetes Maternal Grandmother    • Kidney disease Maternal Grandmother    • Stroke Maternal Grandmother    • Cancer Maternal Grandfather    • Heart attack Maternal Grandfather    • Hearing loss Maternal Grandfather    • Stroke  Maternal Grandfather    • Ovarian cancer Neg Hx    • Uterine cancer Neg Hx      Social History     Tobacco Use   • Smoking status: Former     Packs/day: 2.00     Years: 7.00     Pack years: 14.00     Types: Cigarettes     Start date:      Quit date:      Years since quittin.2   • Smokeless tobacco: Never   Vaping Use   • Vaping Use: Former   • Quit date: 2022   • Substances: Nicotine   • Devices: [employer] tank   Substance Use Topics   • Alcohol use: Not Currently     Comment: 0-1/week when not pregnant   • Drug use: No     Medications Prior to Admission   Medication Sig Dispense Refill Last Dose   • aspirin 81 MG chewable tablet Chew 1 tablet Daily.   3/19/2023   • bisoprolol (ZEBeta) 5 MG tablet TAKE ONE TABLET BY MOUTH TWICE A DAY 60 tablet 6 3/19/2023   • cetirizine (zyrTEC) 10 MG tablet TAKE ONE TABLET BY MOUTH DAILY 90 tablet 1 3/19/2023   • citalopram (CeleXA) 20 MG tablet Take 1 tablet by mouth every night at bedtime. 90 tablet 3 3/19/2023   • Ferrous Sulfate (IRON PO) Take  by mouth Daily.   3/19/2023   • fludrocortisone 0.1 MG tablet TAKE TWO TABLETS BY MOUTH DAILY WITH BREAKFAST 60 tablet 6 3/19/2023   • prenatal vitamin (prenatal, CLASSIC, vitamin) tablet Take  by mouth Daily.   3/19/2023   • albuterol sulfate HFA (PROAIR HFA) 108 (90 Base) MCG/ACT inhaler Inhale 1 puff Every 4 (Four) Hours As Needed for Wheezing. 18 g 0 More than a month   • dexlansoprazole (DEXILANT) 60 MG capsule TAKE ONE CAPSULE BY MOUTH DAILY 30 capsule 0    • EPINEPHrine (EpiPen 2-Florencio) 0.3 MG/0.3ML solution auto-injector injection Inject 0.3 mL into the appropriate muscle as directed by prescriber 1 (One) Time As Needed (anaphylaxis) for up to 1 dose. 2 each 0    • fluticasone (FLONASE) 50 MCG/ACT nasal spray Use 2 sprays in each nostril as directed by provider Daily. 16 g 3 More than a month     Allergies:  Banana, Cantaloupe (diagnostic), Eggs or egg-derived products, Influenza virus vaccine, Mushroom,  "Shellfish-derived products, and Rizatriptan  OB History    Para Term  AB Living   3 1 1 0 1 1   SAB IAB Ectopic Molar Multiple Live Births   1 0 0 0 0 1      # Outcome Date GA Lbr Chi/2nd Weight Sex Delivery Anes PTL Lv   3 Current            2 Term 10/31/19 39w2d 06:54 / 06:22 3070 g (6 lb 12.3 oz) F Vag-Spont EPI N MEREDITH      Birth Comments: caput   1 SAB 18 7w0d    SAB         Birth Comments: MAB with D&C      Obstetric Comments   2019 - Carmen (FOB #1)   Current (FOB #1)   FOB #1 : Pregnancy #1; #2; #3              Objective     /76   Pulse 75   Temp 98 °F (36.7 °C) (Oral)   Resp 16   Ht 180.3 cm (71\")   Wt (!) 148 kg (327 lb)   LMP 2022   Breastfeeding No   BMI 45.61 kg/m²     Physical Exam    General:  No acute distress   Lung: Clear to auscultation bilaterally, no wheezing, clear at bases   Heart: Regular rate and rhythm, no murmurs    Abdomen: Gravid, nontender   Extremities: 2+ DTR's bilaterally, no edema   FHT's: reactive    Cervix:  7/70/-2 with AROM at approximately 740 this a.m.   [place]: Contraction are irregular           Lab Review   External Prenatal Results     Pregnancy Outside Results - Transcribed From Office Records - See Scanned Records For Details     Test Value Date Time    ABO  O  23    Rh  Positive  23    Antibody Screen  Negative  23       Negative  23 1439       Negative  22 1156    Varicella IgG       Rubella  1.75 index 22 1156    Hgb  11.2 g/dL 23       10.3 g/dL 23 1132       10.9 g/dL 23 1439       11.9 g/dL 22 1119       11.2 g/dL 22 1156    Hct  34.8 % 23       31.1 % 23 1132       33.7 % 23 1439       38.5 % 22 1119       34.5 % 22 1156    Glucose Fasting GTT  84 mg/dL 2356    Glucose Tolerance Test 1 hour  171 mg/dL 2356    Glucose Tolerance Test 3 hour  82 mg/dL 23    Gonorrhea (discrete)  " Negative  08/09/22 1200    Chlamydia (discrete)  Negative  08/09/22 1200    RPR  Non Reactive  08/09/22 1156    VDRL       Syphilis Antibody       HBsAg  Negative  08/09/22 1156    Herpes Simplex Virus PCR       Herpes Simplex VIrus Culture       HIV  Non Reactive  08/09/22 1156    Hep C RNA Quant PCR       Hep C Antibody  0.3 s/co ratio 08/09/22 1156    AFP       Group B Strep  No Group B Streptococcus isolated  02/24/23 1759    GBS Susceptibility to Clindamycin       GBS Susceptibility to Erythromycin       Fetal Fibronectin       Genetic Testing, Maternal Blood             Drug Screening     Test Value Date Time    Urine Drug Screen       Amphetamine Screen  Negative  03/19/23 2234       Negative ng/mL 08/09/22 1156    Barbiturate Screen  Negative  03/19/23 2234       Negative ng/mL 08/09/22 1156    Benzodiazepine Screen  Negative  03/19/23 2234       Negative ng/mL 08/09/22 1156    Methadone Screen  Negative  03/19/23 2234       Negative ng/mL 08/09/22 1156    Phencyclidine Screen  Negative  03/19/23 2234       Negative ng/mL 08/09/22 1156    Opiates Screen  Negative  03/19/23 2234    THC Screen  Negative  03/19/23 2234    Cocaine Screen       Propoxyphene Screen  Negative  03/19/23 2234       Negative ng/mL 08/09/22 1156    Buprenorphine Screen  Negative  03/19/23 2234    Methamphetamine Screen       Oxycodone Screen  Negative  03/19/23 2234    Tricyclic Antidepressants Screen  Negative  03/19/23 2234          Legend    ^: Historical                            Lab Results   Component Value Date    ALKPHOS 106 08/28/2022    ALT 21 08/28/2022    AST 25 08/28/2022    CREATININE 0.56 (L) 09/15/2022    BILITOT 0.3 08/28/2022     11/01/2019    URICACID 4.1 11/01/2019     Lab Results   Component Value Date    WBC 14.29 (H) 03/19/2023    HGB 11.2 (L) 03/19/2023    HCT 34.8 03/19/2023    MCV 77.7 (L) 03/19/2023     03/19/2023        Results for orders placed in visit on 03/02/23     Ob Follow Up  Transabdominal Approach    Narrative  PAT NAME: CIARAN CHARLTON  MED REC#: [id number]  BIRTH DA: [id number]  PAT GEND: F  ACCOUNT#: [id number]  PAT TYPE: O  EXAM DAVID: <OBR.7.1>[id number]</OBR.7.1><OBR.7.1>[id number]</OBR.7.1>  REF PHYS QUIN HICKEY  ACCESSION [id number]      Indication  ========    MO. Preeclampsia      Comparison Studies  =================    The findings of this study are compared to the prior ultrasound study dated 2/17/2023      History  ======    General History  Height 176 cm  Height (ft) 5 ft  Height (in) 9 in      Method  =======    [place] E6, Transabdominal ultrasound examination. View: Suboptimal view: limited by maternal body habitus. Limited by late gestational age. Limited to unable to  penetrate part.      Pregnancy  =========    Grey pregnancy. Number of fetuses: 1      Dating  ======    LMP on: 6/17/2022  GA by LMP 36 w + 6 d  HARINDER by LMP: 3/24/2023  Method of dating: based on stated HARINDER  GA by prior assessment 36 w + 6 d  HARINDER by prior assessment: 3/24/2023  Ultrasound examination on: 3/2/2023  GA by U/S based upon: AC, BPD, Femur, HC  GA by U/S 36 w + 0 d  HARINDER by U/S: 3/30/2023  Previous dating: based on stated HARINDER, selected on 02/17/2023  Agreed HARINDER of previous dating: 3/24/2023  Assigned: based on stated HARINDER, selected on 03/2/2023  Assigned GA 36 w + 6 d  Assigned HARINDER: 3/24/2023  Pregnancy length 280 d      General Evaluation  ================    Cardiac activity present.  bpm.  Fetal movements visualized.  Presentation cephalic.  Placenta Placental site: posterior.  Amniotic fluid Amount of AF: normal. MVP 4.0 cm. CRISTINE 12.9 cm. Q1 4.0 cm, Q2 2.1 cm, Q3 3.8 cm, Q4 3.0 cm.      Biophysical Profile  ===============    2: Fetal breathing movements  2: Gross body movements  2: Fetal tone  2: Amniotic fluid volume  8/8 Biophysical profile score      Fetal Biometry  ============    Standard  BPD 89.1 mm  36w 0d        41%        Hadlock    .7 mm  37w 6d         46%        Hadlock    .3 mm  35w 4d        26%        Hadlock    Femur 67.5 mm  34w 5d        6%        Hadlock    HC / AC 1.05    EFW 2,725 g  26%        Dhaval    EFW (lb) 6 lb  EFW (oz) 0 oz  EFW by: Hadlock (BPD-HC-AC-FL)  Other: An ultrasound for fetal weight has a margin of error of up to twenty percent.  Extremities / Bony Struc  FL / BPD 0.76    FL / HC 0.20    FL / AC 0.21    Other Structures   bpm      Fetal Anatomy  ============    Lateral ventricles: Appears normal  Stomach: Appears normal  Kidneys: Appears normal  Bladder: Appears normal/  Gender: Concealed  Wants to know gender:   No      Impression  ==========    Female infant in cephalic presentation with fetal heart rate of 149. Posterior placenta and normal fluid volume with an CRISTINE of 12.9. BPP 8 out of 8. Estimated fetal weight  6 pounds which is 26 percentile.      Recommendation  ===============    Follow-up as clinically indicated.      Indication  ========    MO. Preeclampsia      Comparison Studies  =================    The findings of this study are compared to the prior ultrasound study dated 2/17/2023      History  ======    General History  Height 176 cm  Height (ft) 5 ft  Height (in) 9 in      Method  =======    Voluson E6, Transabdominal ultrasound examination. View: Suboptimal view: limited by maternal body habitus. Limited by late gestational age. Limited to unable to  penetrate part.      Pregnancy  =========    Grey pregnancy. Number of fetuses: 1      Dating  ======    LMP on: 6/17/2022  GA by LMP 36 w + 6 d  HARINDER by LMP: 3/24/2023  Method of dating: based on stated HARINDER  GA by prior assessment 36 w + 6 d  HARINDER by prior assessment: 3/24/2023  Ultrasound examination on: 3/2/2023  GA by U/S based upon: AC, BPD, Femur, HC  GA by U/S 36 w + 0 d  HARINDER by U/S: 3/30/2023  Previous dating: based on stated HARINDER, selected on 02/17/2023  Agreed HARINDER of previous dating: 3/24/2023  Assigned: based on stated HARINDER, selected on  2023  Assigned GA 36 w + 6 d  Assigned HARINDER: 3/24/2023  Pregnancy length 280 d      General Evaluation  ================    Cardiac activity present.  bpm.  Fetal movements visualized.  Presentation cephalic.  Placenta Placental site: posterior.  Amniotic fluid Amount of AF: normal. MVP 4.0 cm. CRISTINE 12.9 cm. Q1 4.0 cm, Q2 2.1 cm, Q3 3.8 cm, Q4 3.0 cm.      Biophysical Profile  ===============    2: Fetal breathing movements  2: Gross body movements  2: Fetal tone  2: Amniotic fluid volume   Biophysical profile score      Fetal Biometry  ============    Standard  BPD 89.1 mm  36w 0d        41%        Hadlock    .7 mm  37w 6d        46%        Hadlock    .3 mm  35w 4d        26%        Hadlock    Femur 67.5 mm  34w 5d        6%        Hadlock    HC / AC 1.05    EFW 2,725 g  26%        Dhaval    EFW (lb) 6 lb  EFW (oz) 0 oz  EFW by: Hadlock (BPD-HC-AC-FL)  Other: An ultrasound for fetal weight has a margin of error of up to twenty percent.  Extremities / Bony Struc  FL / BPD 0.76    FL / HC 0.20    FL / AC 0.21    Other Structures   bpm      Fetal Anatomy  ============    Lateral ventricles: Appears normal  Stomach: Appears normal  Kidneys: Appears normal  Bladder: Appears normal/  Gender: Concealed  Wants to know gender:   No      Impression  ==========    Female infant in cephalic presentation with fetal heart rate of 149. Posterior placenta and normal fluid volume with an CRISTINE of 12.9. BPP 8 out of 8. Estimated fetal weight  6 pounds which is 26 percentile.      Recommendation  ===============    Follow-up as clinically indicated.        Sonographer: RT Robyn(R), [facility]  Physician: Paola Call MD, FACOG    Electronically signed by: Paola Call MD, FACOG at:  09:05              Patient Active Problem List    Diagnosis Date Noted   • *Pregnancy 2023   •  (normal spontaneous vaginal delivery) 2023     Note Last Updated: 3/20/2023     3/20/2023  6  pounds 12 ounces at 39 and 3 weeks baby girl Loco     • Elevated glucose tolerance test 01/07/2023     Note Last Updated: 1/13/2023     145, recommend 3-hour testing-failed 1 out of 4     • Inappropriate sinus node tachycardia 12/29/2022   • POTS (postural orthostatic tachycardia syndrome) 09/08/2022   • Maternal anemia in pregnancy, antepartum 08/28/2022     Note Last Updated: 1/7/2023     On iron supplementation.     • Depression affecting pregnancy, antepartum 08/28/2022   • Obesity in pregnancy, antepartum 08/09/2022     Note Last Updated: 2/8/2023     Advise 15 to 20 pound weight gain in pregnancy.  We will get early Glucola at 12 weeks.  Advise baby aspirin on weeks 12 through 36  We will get ultrasound at 37 weeks for growth.     • Prenatal care, subsequent pregnancy 08/08/2022     Note Last Updated: 3/2/2023     IOL 3/19 at 9:30 PM     • Family history of breast cancer in mother 06/03/2021     Note Last Updated: 6/3/2021     Patient's mother was diagnosed at age 53.  BRCA testing was negative.  We discussed starting screening at age 40 or when patient feels anything abnormal.     • Vitamin D deficiency 01/22/2021   • Class 3 severe obesity due to excess calories with body mass index (BMI) of 40.0 to 44.9 in adult ([facility]) 02/08/2020   • Recurrent major depressive disorder, in partial remission ([facility]) 12/06/2019   • Asthma 01/04/2019        Assessment & Plan     ASSESSMENT  1. IUP at 39w3d  2. induction of labor   3. GBBSnegative    PLAN  1. Admit to labor and delivery   2.  pitocin and gilliland bulb         Paola Call MD  3/20/2023@

## 2023-03-20 NOTE — PROCEDURES
"Patient admitted for elective induction of labor at 39 weeks 2 days gestation.  Received request for placement of transcervical Black bulb secondary to unfavorable cervix.  Cervical examination: 1-2 cm / 50%/-2 station (cervix mid position and medium texture).  Cephalic presentation confirmed by bedside ultrasound.  Transcervical Black placed per physician request.  FHT's class 1.  Patient tolerated well.  24 [place], 60ml.    Guru Lionel \"[place]\" SHERRY Currie MD  3/19/2023  23:13 EDT        "

## 2023-03-20 NOTE — L&D DELIVERY NOTE
[facility]   Vaginal Delivery Note    Patient Name: Ammon Vargas  : 1995  MRN: [id number]    Date of Delivery: 3/20/2023     Diagnosis     Pre & Post-Delivery:  Intrauterine pregnancy at 39w3d  Labor status: Induced Onset of Labor     Pregnancy     (normal spontaneous vaginal delivery)             Problem List    Transfer to Postpartum     Review the Delivery Report for details.     Delivery     Delivery: Vaginal, Spontaneous     YOB: 2023    Time of Birth:  Gestational Age 10:22 AM   39w3d     Anesthesia: Epidural     Delivering clinician:     Forceps?   No   Vacuum? No    Shoulder dystocia present: No        Delivery narrative: Patient pushed for approximately 15 minutes and delivered head in straight OA position.  Nuchal cord x1 was reduced.  Shoulders and body easily followed.  Baby placed on maternal abdomen.  Nose and mouth were bulb suction.  After 1 minute, the cord was clamped x2.  Father cut cord.  Cord blood was obtained.  Placenta delivered spontaneously and intact.  Second-degree laceration repaired with 3-0 Vicryl in a standard fashion.  All sponge lap needle counts were correct x2.      Infant     Findings: female  infant     Infant observations: Weight: 3065 g (6 lb 12.1 oz)   Length: 19.5  in  Observations/Comments:        Apgars: 8  @ 1 minute /    9  @ 5 minutes   Infant Name:  Loco     Placenta & Cord         Placenta delivered  Spontaneous  at   3/20/2023 10:25 AM     Cord: 3 vessels  present.   Nuchal Cord?  yes; Number of nuchal loops present:   1   Cord blood obtained: No    Cord gases obtained:  No    Cord gas results: Venous:  No results found for: PHCVEN    Arterial:  No results found for: PHCART     Repair      Episiotomy: None     No    Lacerations: Yes  Laceration Information  Laceration Repaired?   Perineal: 2nd      Periurethral:       Labial:       Sulcus:       Vaginal:       Cervical:         Suture used for repair: 3-0 Vicryl   Estimated  Blood Loss: Est. Blood Loss (mL): 200 mL (Filed from Delivery Summary) (03/20/23 1022)     Quantitative Blood Loss:          Complications     none    Disposition     Mother to Mother Baby/Postpartum  in stable condition currently.  Baby to remains with mom  in stable condition currently.    Paola Call MD  03/20/23  10:40 EDT

## 2023-03-20 NOTE — ANESTHESIA PROCEDURE NOTES
Labor Epidural      Patient reassessed immediately prior to procedure    Patient location during procedure: OB  Start Time: 3/20/2023 3:15 AM  Stop Time: 3/20/2023 3:38 AM  Performed By  Anesthesiologist: Sanjeev Patiño MD  Preanesthetic Checklist  Completed: patient identified, IV checked, site marked, risks and benefits discussed, surgical consent, monitors and equipment checked, pre-op evaluation and timeout performed  Prep:  Pt Position:sitting  Sterile Tech:cap, gloves, mask and sterile barrier  Prep:povidone-iodine 7.5% surgical scrub  Monitoring:blood pressure monitoring  Epidural Block Procedure:  Approach:midline  Guidance:landmark technique  Location:L3-L4  Needle Type:Tuohy  Needle Gauge:17 G  Loss of Resistance Medium: air  Loss of Resistance: 5cm  Cath Depth at skin:15 cm  Paresthesia: none  Aspiration:negative  Test Dose:negative  Number of Attempts: 1  Post Assessment:  Dressing:occlusive dressing applied and secured with tape  Pt Tolerance:patient tolerated the procedure well with no apparent complications  Complications:no

## 2023-03-20 NOTE — CASE MANAGEMENT/SOCIAL WORK
Continued Stay Note  [facility]     Patient Name: Ammon Vargas  MRN: [id number]  Today's Date: 3/20/2023    Admit Date: 3/19/2023    Plan: MSW available   Discharge Plan     Row Name 03/20/23 1704       Plan    Plan MSW available    Plan Comments Visited pt. She had high [place] score and answered she hardly ever has thoughts of harming herself. Pt reports being diagnosed as bipolar. Currently prescribed celexa. States she has struggled some during pregnancy. Reports having intrusive thoughts last week but denies ever having a suicidal plan or thoughts.  Reports she has seen a therapist in the past but does not currently. Reports she is concerned about cost of co pays with a therapist. FOB is employed by [employer]. MSW encouraged pt to inquire about a possible employee assistance program through his position.  Pt reports she will ask FOB about this. Provided her with info on a local support group for PPD that is free through [employer]. Pt appeared interested in this program.    Final Discharge Disposition Code 01 - home or self-care               Discharge Codes    No documentation.                     TIEN Brewer

## 2023-03-21 ENCOUNTER — TELEPHONE (OUTPATIENT)
Dept: CARDIOLOGY | Facility: CLINIC | Age: 28
End: 2023-03-21
Payer: COMMERCIAL

## 2023-03-21 LAB
ALP SERPL-CCNC: 140 U/L (ref 39–117)
ALT SERPL W P-5'-P-CCNC: 6 U/L (ref 1–33)
AST SERPL-CCNC: 16 U/L (ref 1–32)
BASOPHILS # BLD AUTO: 0.03 10*3/MM3 (ref 0–0.2)
BASOPHILS NFR BLD AUTO: 0.3 % (ref 0–1.5)
BILIRUB SERPL-MCNC: <0.2 MG/DL (ref 0–1.2)
CREAT SERPL-MCNC: 0.56 MG/DL (ref 0.57–1)
DEPRECATED RDW RBC AUTO: 43.8 FL (ref 37–54)
EOSINOPHIL # BLD AUTO: 0.14 10*3/MM3 (ref 0–0.4)
EOSINOPHIL NFR BLD AUTO: 1.2 % (ref 0.3–6.2)
ERYTHROCYTE [DISTWIDTH] IN BLOOD BY AUTOMATED COUNT: 15.3 % (ref 12.3–15.4)
HCT VFR BLD AUTO: 31.4 % (ref 34–46.6)
HGB BLD-MCNC: 9.8 G/DL (ref 12–15.9)
IMM GRANULOCYTES # BLD AUTO: 0.06 10*3/MM3 (ref 0–0.05)
IMM GRANULOCYTES NFR BLD AUTO: 0.5 % (ref 0–0.5)
LDH SERPL-CCNC: 247 U/L (ref 135–214)
LYMPHOCYTES # BLD AUTO: 2.05 10*3/MM3 (ref 0.7–3.1)
LYMPHOCYTES NFR BLD AUTO: 17.9 % (ref 19.6–45.3)
MCH RBC QN AUTO: 24.6 PG (ref 26.6–33)
MCHC RBC AUTO-ENTMCNC: 31.2 G/DL (ref 31.5–35.7)
MCV RBC AUTO: 78.9 FL (ref 79–97)
MONOCYTES # BLD AUTO: 0.69 10*3/MM3 (ref 0.1–0.9)
MONOCYTES NFR BLD AUTO: 6 % (ref 5–12)
NEUTROPHILS NFR BLD AUTO: 74.1 % (ref 42.7–76)
NEUTROPHILS NFR BLD AUTO: 8.46 10*3/MM3 (ref 1.7–7)
NRBC BLD AUTO-RTO: 0 /100 WBC (ref 0–0.2)
PLATELET # BLD AUTO: 261 10*3/MM3 (ref 140–450)
PMV BLD AUTO: 10.4 FL (ref 6–12)
RBC # BLD AUTO: 3.98 10*6/MM3 (ref 3.77–5.28)
URATE SERPL-MCNC: 4.1 MG/DL (ref 2.4–5.7)
WBC NRBC COR # BLD: 11.43 10*3/MM3 (ref 3.4–10.8)

## 2023-03-21 PROCEDURE — 84550 ASSAY OF BLOOD/URIC ACID: CPT

## 2023-03-21 PROCEDURE — 82565 ASSAY OF CREATININE: CPT

## 2023-03-21 PROCEDURE — 84450 TRANSFERASE (AST) (SGOT): CPT

## 2023-03-21 PROCEDURE — 82247 BILIRUBIN TOTAL: CPT

## 2023-03-21 PROCEDURE — 84460 ALANINE AMINO (ALT) (SGPT): CPT

## 2023-03-21 PROCEDURE — 83615 LACTATE (LD) (LDH) ENZYME: CPT

## 2023-03-21 PROCEDURE — 84075 ASSAY ALKALINE PHOSPHATASE: CPT

## 2023-03-21 PROCEDURE — 85025 COMPLETE CBC W/AUTO DIFF WBC: CPT | Performed by: OBSTETRICS & GYNECOLOGY

## 2023-03-21 PROCEDURE — 0503F POSTPARTUM CARE VISIT: CPT

## 2023-03-21 RX ORDER — FERROUS SULFATE 325(65) MG
325 TABLET ORAL
Status: DISCONTINUED | OUTPATIENT
Start: 2023-03-21 | End: 2023-03-22 | Stop reason: HOSPADM

## 2023-03-21 RX ADMIN — IBUPROFEN 600 MG: 600 TABLET, FILM COATED ORAL at 14:02

## 2023-03-21 RX ADMIN — BISOPROLOL FUMARATE 5 MG: 5 TABLET, FILM COATED ORAL at 21:22

## 2023-03-21 RX ADMIN — DOCUSATE SODIUM 100 MG: 100 CAPSULE, LIQUID FILLED ORAL at 08:39

## 2023-03-21 RX ADMIN — FERROUS SULFATE TAB 325 MG (65 MG ELEMENTAL FE) 325 MG: 325 (65 FE) TAB at 12:20

## 2023-03-21 RX ADMIN — HYDROCODONE BITARTRATE AND ACETAMINOPHEN 1 TABLET: 10; 325 TABLET ORAL at 22:15

## 2023-03-21 RX ADMIN — IBUPROFEN 600 MG: 600 TABLET, FILM COATED ORAL at 01:24

## 2023-03-21 RX ADMIN — BISOPROLOL FUMARATE 5 MG: 5 TABLET, FILM COATED ORAL at 08:39

## 2023-03-21 RX ADMIN — HYDROCODONE BITARTRATE AND ACETAMINOPHEN 1 TABLET: 10; 325 TABLET ORAL at 14:02

## 2023-03-21 RX ADMIN — ACETAMINOPHEN 325MG 650 MG: 325 TABLET ORAL at 04:07

## 2023-03-21 RX ADMIN — IBUPROFEN 600 MG: 600 TABLET, FILM COATED ORAL at 21:21

## 2023-03-21 RX ADMIN — ACETAMINOPHEN 325MG 650 MG: 325 TABLET ORAL at 09:59

## 2023-03-21 RX ADMIN — CETIRIZINE HYDROCHLORIDE 10 MG: 10 TABLET, FILM COATED ORAL at 08:39

## 2023-03-21 RX ADMIN — DOCUSATE SODIUM 100 MG: 100 CAPSULE, LIQUID FILLED ORAL at 21:21

## 2023-03-21 RX ADMIN — HYDROCODONE BITARTRATE AND ACETAMINOPHEN 1 TABLET: 10; 325 TABLET ORAL at 18:05

## 2023-03-21 RX ADMIN — FLUDROCORTISONE ACETATE 200 MCG: 0.1 TABLET ORAL at 07:51

## 2023-03-21 RX ADMIN — CITALOPRAM HYDROBROMIDE 20 MG: 20 TABLET ORAL at 08:39

## 2023-03-21 RX ADMIN — IBUPROFEN 600 MG: 600 TABLET, FILM COATED ORAL at 07:51

## 2023-03-21 NOTE — ANESTHESIA POSTPROCEDURE EVALUATION
Patient: Ammon Vargas    Procedure Summary     Date: 03/20/23 Room / Location:     Anesthesia Start: 0315 Anesthesia Stop: 1025    Procedure: LABOR ANALGESIA Diagnosis:     Scheduled Providers:  Provider: Sanjeev Patiño MD    Anesthesia Type: epidural ASA Status: 2          Anesthesia Type: epidural    Vitals  Vitals Value Taken Time   /74 03/21/23 0715   Temp 97.7 °F (36.5 °C) 03/21/23 0715   Pulse 66 03/21/23 0715   Resp 18 03/21/23 0715   SpO2             Post Anesthesia Care and Evaluation    Patient location during evaluation: bedside  Patient participation: complete - patient participated  Level of consciousness: awake and alert  Pain management: adequate    Airway patency: patent  Anesthetic complications: No anesthetic complications    Cardiovascular status: acceptable  Respiratory status: acceptable  Hydration status: acceptable  Post Neuraxial Block status: Motor and sensory function returned to baseline and No signs or symptoms of PDPH

## 2023-03-21 NOTE — TELEPHONE ENCOUNTER
CIARAN CHARLTON Key: [id number]  PA Case ID: [id number]   Rx #: [id number]  [phone]    Sent to Plan today    Droxidopa 200MG capsules

## 2023-03-21 NOTE — PROGRESS NOTES
3/21/2023  PPD #1    Subjective   Macayla feels tired, sleeping well.  Patient describes her lochia less than menses.  Pain is well controlled.        Objective   Temp: Temp:  [97.7 °F (36.5 °C)-97.9 °F (36.6 °C)] 97.7 °F (36.5 °C) Temp src: Oral   BP: BP: (117-152)/(64-90) 136/90        Pulse: Heart Rate:  [63-99] 70  RR: Resp:  [16-18] 18    General:  No acute distress   Abdomen: Fundus firm and beneath umbilicus   Pelvis: deferred     Lab Results   Component Value Date    WBC 11.43 (H) 03/21/2023    HGB 9.8 (L) 03/21/2023    HCT 31.4 (L) 03/21/2023    MCV 78.9 (L) 03/21/2023     03/21/2023    HEPBSAG Negative 08/09/2022       Assessment  1. PPD# 1 after vaginal delivery   2. Stable postpartum anemia.  3. Elevated blood pressures throughout the night, possibly associated with poor pain control. Last repeat blood pressure was 136/90 prior to taking beta blocker this am.  4. Stable female infant, bottle feeding per Cardiology note r/t POTS.     Plan  1. Routine postpartum care.  2. Add stat PEP.   3. Nursing to notify if blood pressure becomes severe or patient has any symptoms of preeclampsia.  4. CBC, PEP in am.  5. Depression-continue celexa.  6. Add Ferrous Sulfate daily for anemia.  7. Plan for discharge 3/23/23.    This note has been electronically signed.    Camille Roy, APRN  March 21, 2023

## 2023-03-22 VITALS
TEMPERATURE: 97.9 F | WEIGHT: 293 LBS | DIASTOLIC BLOOD PRESSURE: 87 MMHG | RESPIRATION RATE: 18 BRPM | SYSTOLIC BLOOD PRESSURE: 139 MMHG | HEIGHT: 71 IN | BODY MASS INDEX: 41.02 KG/M2 | HEART RATE: 73 BPM

## 2023-03-22 LAB
ALP SERPL-CCNC: 125 U/L (ref 39–117)
ALT SERPL W P-5'-P-CCNC: 10 U/L (ref 1–33)
AST SERPL-CCNC: 14 U/L (ref 1–32)
BILIRUB SERPL-MCNC: <0.2 MG/DL (ref 0–1.2)
CREAT SERPL-MCNC: 0.51 MG/DL (ref 0.57–1)
DEPRECATED RDW RBC AUTO: 44.1 FL (ref 37–54)
ERYTHROCYTE [DISTWIDTH] IN BLOOD BY AUTOMATED COUNT: 15.2 % (ref 12.3–15.4)
HCT VFR BLD AUTO: 31.4 % (ref 34–46.6)
HGB BLD-MCNC: 9.9 G/DL (ref 12–15.9)
LDH SERPL-CCNC: 133 U/L (ref 135–214)
MCH RBC QN AUTO: 25.1 PG (ref 26.6–33)
MCHC RBC AUTO-ENTMCNC: 31.5 G/DL (ref 31.5–35.7)
MCV RBC AUTO: 79.7 FL (ref 79–97)
PLATELET # BLD AUTO: 259 10*3/MM3 (ref 140–450)
PMV BLD AUTO: 10.1 FL (ref 6–12)
RBC # BLD AUTO: 3.94 10*6/MM3 (ref 3.77–5.28)
URATE SERPL-MCNC: 4.3 MG/DL (ref 2.4–5.7)
WBC NRBC COR # BLD: 7.85 10*3/MM3 (ref 3.4–10.8)

## 2023-03-22 PROCEDURE — 0503F POSTPARTUM CARE VISIT: CPT

## 2023-03-22 PROCEDURE — 84450 TRANSFERASE (AST) (SGOT): CPT

## 2023-03-22 PROCEDURE — 85027 COMPLETE CBC AUTOMATED: CPT

## 2023-03-22 PROCEDURE — 82565 ASSAY OF CREATININE: CPT

## 2023-03-22 PROCEDURE — 84075 ASSAY ALKALINE PHOSPHATASE: CPT

## 2023-03-22 PROCEDURE — 84550 ASSAY OF BLOOD/URIC ACID: CPT

## 2023-03-22 PROCEDURE — 82247 BILIRUBIN TOTAL: CPT

## 2023-03-22 PROCEDURE — 83615 LACTATE (LD) (LDH) ENZYME: CPT

## 2023-03-22 PROCEDURE — 84460 ALANINE AMINO (ALT) (SGPT): CPT

## 2023-03-22 RX ORDER — IBUPROFEN 600 MG/1
600 TABLET ORAL EVERY 6 HOURS PRN
Qty: 30 TABLET | Refills: 0 | Status: SHIPPED | OUTPATIENT
Start: 2023-03-22

## 2023-03-22 RX ORDER — HYDROCODONE BITARTRATE AND ACETAMINOPHEN 5; 325 MG/1; MG/1
1 TABLET ORAL EVERY 6 HOURS PRN
Qty: 8 TABLET | Refills: 0 | Status: SHIPPED | OUTPATIENT
Start: 2023-03-22 | End: 2023-03-29

## 2023-03-22 RX ADMIN — WITCH HAZEL 1 PAD: 500 SOLUTION RECTAL; TOPICAL at 09:55

## 2023-03-22 RX ADMIN — FERROUS SULFATE TAB 325 MG (65 MG ELEMENTAL FE) 325 MG: 325 (65 FE) TAB at 08:53

## 2023-03-22 RX ADMIN — CITALOPRAM HYDROBROMIDE 20 MG: 20 TABLET ORAL at 08:52

## 2023-03-22 RX ADMIN — Medication: at 09:55

## 2023-03-22 RX ADMIN — HYDROCODONE BITARTRATE AND ACETAMINOPHEN 1 TABLET: 10; 325 TABLET ORAL at 02:10

## 2023-03-22 RX ADMIN — FLUDROCORTISONE ACETATE 200 MCG: 0.1 TABLET ORAL at 08:51

## 2023-03-22 RX ADMIN — CETIRIZINE HYDROCHLORIDE 10 MG: 10 TABLET, FILM COATED ORAL at 08:52

## 2023-03-22 RX ADMIN — IBUPROFEN 600 MG: 600 TABLET, FILM COATED ORAL at 10:45

## 2023-03-22 RX ADMIN — DOCUSATE SODIUM 100 MG: 100 CAPSULE, LIQUID FILLED ORAL at 08:52

## 2023-03-22 RX ADMIN — BISOPROLOL FUMARATE 5 MG: 5 TABLET, FILM COATED ORAL at 08:51

## 2023-03-22 RX ADMIN — IBUPROFEN 600 MG: 600 TABLET, FILM COATED ORAL at 04:35

## 2023-03-22 NOTE — TELEPHONE ENCOUNTER
PA denied, I contacted PA help at [phone], they are faxing hard copy PA, will send in again once received and filled out.

## 2023-03-22 NOTE — DISCHARGE SUMMARY
Discharge Summary    Date of Admission: 3/19/2023  Date of Discharge:  3/22/2023      Patient: Ammon Vargas      MR#:[id number]    Delivery Provider: Paola Call     Discharge Surgeon/OB: Paola Call    Presenting Problem/History of Present Illness  Pregnancy [Z34.90]     Patient Active Problem List   Diagnosis   • Asthma   • Recurrent major depressive disorder, in partial remission (HCC)   • Class 3 severe obesity due to excess calories with body mass index (BMI) of 40.0 to 44.9 in adult (HCC)   • Vitamin D deficiency   • Family history of breast cancer in mother   • Prenatal care, subsequent pregnancy   • Obesity in pregnancy, antepartum   • Maternal anemia in pregnancy, antepartum   • Depression affecting pregnancy, antepartum   • POTS (postural orthostatic tachycardia syndrome)   • Inappropriate sinus node tachycardia   • Elevated glucose tolerance test   • Pregnancy   •  (normal spontaneous vaginal delivery)         Discharge Diagnosis: Vaginal delivery at 39w3d    Procedures:  Vaginal, Spontaneous     3/20/2023    10:22 AM        Discharge Date: 3/22/2023;     Hospital Course  Patient is a 27 y.o. female  at 39w3d status post vaginal delivery without complication. Postpartum the patient did well. Blood pressure is mildly elevated. She is asymptomatic with an unremarkable preeclampsia panel. She remained afebrile, with vital signs stable. Patient reported lochia as mild and pain managed. She was ready for discharge on postpartum day 2.     Infant:   female  fetus 3065 g (6 lb 12.1 oz)  with Apgar scores of 8 , 9  at five minutes.    Condition on Discharge:  Stable    Vital Signs  Temp:  [97.6 °F (36.4 °C)-97.9 °F (36.6 °C)] 97.9 °F (36.6 °C)  Heart Rate:  [68-75] 73  Resp:  [18] 18  BP: (114-139)/(56-87) 139/87    Lab Results   Component Value Date    WBC 7.85 2023    HGB 9.9 (L) 2023    HCT 31.4 (L) 2023    MCV 79.7 2023     2023        Discharge Disposition  Home or Self Care    Discharge Medications     Discharge Medications      New Medications      Instructions Start Date   HYDROcodone-acetaminophen 5-325 MG per tablet  Commonly known as: NORCO   1 tablet, Oral, Every 6 Hours PRN      ibuprofen 600 MG tablet  Commonly known as: ADVIL,MOTRIN   600 mg, Oral, Every 6 Hours PRN         Stop These Medications    aspirin 81 MG chewable tablet     bisoprolol 5 MG tablet  Commonly known as: ZEBeta     cetirizine 10 MG tablet  Commonly known as: zyrTEC     citalopram 20 MG tablet  Commonly known as: CeleXA     dexlansoprazole 60 MG capsule  Commonly known as: DEXILANT     Droxidopa 200 MG capsule  Commonly known as: Northera     EPINEPHrine 0.3 MG/0.3ML solution auto-injector injection  Commonly known as: EpiPen 2-Florencio     fludrocortisone 0.1 MG tablet     fluticasone 50 MCG/ACT nasal spray  Commonly known as: Flonase     IRON PO     prenatal (CLASSIC) vitamin  tablet  Generic drug: prenatal vitamin     Ventolin  (90 Base) MCG/ACT inhaler  Generic drug: albuterol sulfate HFA            Activity at Discharge:   Activity Instructions     Pelvic Rest      No Sex, Tampons, Swimming, Tub Baths x 6 weeks          Follow-up Appointments  Future Appointments   Date Time Provider Department Center   5/1/2023  3:00 PM Paola Call MD [facility]   7/26/2023  3:15 PM Chavez Moran MD [facility]   9/18/2023  9:00 AM Eb Prabhakar PA [facility]     Additional Instructions for the Follow-ups that You Need to Schedule     Call MD With Problems / Concerns   As directed      Call for persistent headaches that do not resolve with medication, vision changes like spots or blurry vision, mid epigastric or right upper quadrant abdominal pain, shortness of breath, severe swelling, and/or BP >140/90. Symptoms related to post partum depression (symptoms reviewed with patient.)    Order Comments: Call for persistent headaches that do  not resolve with medication, vision changes like spots or blurry vision, mid epigastric or right upper quadrant abdominal pain, shortness of breath, severe swelling, and/or BP >140/90. Symptoms related to post partum depression (symptoms reviewed with patient.)          Discharge Follow-up with Specified Provider: Oneyda/NP; 1 Week   As directed      To: Oneyda/NP    Follow Up: 1 Week    Follow Up Details: Blood pressure check               FRIEDA Linarse  03/22/23  10:32 EDT  Csd

## 2023-03-29 ENCOUNTER — TRANSCRIBE ORDERS (OUTPATIENT)
Dept: LAB | Facility: HOSPITAL | Age: 28
End: 2023-03-29
Payer: COMMERCIAL

## 2023-03-29 ENCOUNTER — LAB (OUTPATIENT)
Dept: LAB | Facility: HOSPITAL | Age: 28
End: 2023-03-29
Payer: COMMERCIAL

## 2023-03-29 ENCOUNTER — TELEPHONE (OUTPATIENT)
Dept: CARDIOLOGY | Facility: CLINIC | Age: 28
End: 2023-03-29
Payer: COMMERCIAL

## 2023-03-29 ENCOUNTER — OFFICE VISIT (OUTPATIENT)
Dept: FAMILY MEDICINE CLINIC | Facility: CLINIC | Age: 28
End: 2023-03-29
Payer: COMMERCIAL

## 2023-03-29 ENCOUNTER — TELEPHONE (OUTPATIENT)
Dept: FAMILY MEDICINE CLINIC | Facility: CLINIC | Age: 28
End: 2023-03-29
Payer: COMMERCIAL

## 2023-03-29 VITALS
DIASTOLIC BLOOD PRESSURE: 88 MMHG | BODY MASS INDEX: 41.02 KG/M2 | HEIGHT: 71 IN | WEIGHT: 293 LBS | SYSTOLIC BLOOD PRESSURE: 130 MMHG | OXYGEN SATURATION: 98 % | TEMPERATURE: 96.9 F | HEART RATE: 66 BPM

## 2023-03-29 DIAGNOSIS — Z86.14 HX MRSA INFECTION: ICD-10-CM

## 2023-03-29 DIAGNOSIS — L02.415 ABSCESS OF RIGHT LEG: ICD-10-CM

## 2023-03-29 DIAGNOSIS — L02.214 ABSCESS OF GROIN, RIGHT: ICD-10-CM

## 2023-03-29 DIAGNOSIS — L02.415 ABSCESS OF RIGHT LEG: Primary | ICD-10-CM

## 2023-03-29 DIAGNOSIS — L03.115 CELLULITIS OF RIGHT LEG: ICD-10-CM

## 2023-03-29 DIAGNOSIS — L03.314 CELLULITIS OF GROIN: Primary | ICD-10-CM

## 2023-03-29 LAB
ALBUMIN SERPL-MCNC: 3.5 G/DL (ref 3.5–5.2)
ALBUMIN/GLOB SERPL: 1.2 G/DL
ALP SERPL-CCNC: 146 U/L (ref 39–117)
ALT SERPL W P-5'-P-CCNC: 5 U/L (ref 1–33)
ANION GAP SERPL CALCULATED.3IONS-SCNC: 11 MMOL/L (ref 5–15)
AST SERPL-CCNC: 7 U/L (ref 1–32)
BASOPHILS # BLD AUTO: 0.04 10*3/MM3 (ref 0–0.2)
BASOPHILS NFR BLD AUTO: 0.4 % (ref 0–1.5)
BILIRUB SERPL-MCNC: 0.3 MG/DL (ref 0–1.2)
BUN SERPL-MCNC: 10 MG/DL (ref 6–20)
BUN/CREAT SERPL: 14.5 (ref 7–25)
CALCIUM SPEC-SCNC: 8.7 MG/DL (ref 8.6–10.5)
CHLORIDE SERPL-SCNC: 104 MMOL/L (ref 98–107)
CK SERPL-CCNC: 25 U/L (ref 20–180)
CO2 SERPL-SCNC: 24 MMOL/L (ref 22–29)
CREAT SERPL-MCNC: 0.69 MG/DL (ref 0.57–1)
CRP SERPL-MCNC: 3.95 MG/DL (ref 0–0.5)
DEPRECATED RDW RBC AUTO: 41.1 FL (ref 37–54)
EGFRCR SERPLBLD CKD-EPI 2021: 122.2 ML/MIN/1.73
EOSINOPHIL # BLD AUTO: 0.27 10*3/MM3 (ref 0–0.4)
EOSINOPHIL NFR BLD AUTO: 2.9 % (ref 0.3–6.2)
ERYTHROCYTE [DISTWIDTH] IN BLOOD BY AUTOMATED COUNT: 14.5 % (ref 12.3–15.4)
ERYTHROCYTE [SEDIMENTATION RATE] IN BLOOD: 58 MM/HR (ref 0–20)
GLOBULIN UR ELPH-MCNC: 3 GM/DL
GLUCOSE SERPL-MCNC: 88 MG/DL (ref 65–99)
HCT VFR BLD AUTO: 35.5 % (ref 34–46.6)
HGB BLD-MCNC: 11.2 G/DL (ref 12–15.9)
IMM GRANULOCYTES # BLD AUTO: 0.04 10*3/MM3 (ref 0–0.05)
IMM GRANULOCYTES NFR BLD AUTO: 0.4 % (ref 0–0.5)
LYMPHOCYTES # BLD AUTO: 1.57 10*3/MM3 (ref 0.7–3.1)
LYMPHOCYTES NFR BLD AUTO: 16.6 % (ref 19.6–45.3)
MCH RBC QN AUTO: 24.8 PG (ref 26.6–33)
MCHC RBC AUTO-ENTMCNC: 31.5 G/DL (ref 31.5–35.7)
MCV RBC AUTO: 78.5 FL (ref 79–97)
MONOCYTES # BLD AUTO: 0.55 10*3/MM3 (ref 0.1–0.9)
MONOCYTES NFR BLD AUTO: 5.8 % (ref 5–12)
NEUTROPHILS NFR BLD AUTO: 6.97 10*3/MM3 (ref 1.7–7)
NEUTROPHILS NFR BLD AUTO: 73.9 % (ref 42.7–76)
NRBC BLD AUTO-RTO: 0 /100 WBC (ref 0–0.2)
PLATELET # BLD AUTO: 403 10*3/MM3 (ref 140–450)
PMV BLD AUTO: 9.6 FL (ref 6–12)
POTASSIUM SERPL-SCNC: 3.8 MMOL/L (ref 3.5–5.2)
PROT SERPL-MCNC: 6.5 G/DL (ref 6–8.5)
RBC # BLD AUTO: 4.52 10*6/MM3 (ref 3.77–5.28)
SODIUM SERPL-SCNC: 139 MMOL/L (ref 136–145)
WBC NRBC COR # BLD: 9.44 10*3/MM3 (ref 3.4–10.8)

## 2023-03-29 PROCEDURE — 82550 ASSAY OF CK (CPK): CPT

## 2023-03-29 PROCEDURE — 36415 COLL VENOUS BLD VENIPUNCTURE: CPT

## 2023-03-29 PROCEDURE — 87205 SMEAR GRAM STAIN: CPT | Performed by: PHYSICIAN ASSISTANT

## 2023-03-29 PROCEDURE — 86140 C-REACTIVE PROTEIN: CPT

## 2023-03-29 PROCEDURE — 80053 COMPREHEN METABOLIC PANEL: CPT

## 2023-03-29 PROCEDURE — 85025 COMPLETE CBC W/AUTO DIFF WBC: CPT

## 2023-03-29 PROCEDURE — 87070 CULTURE OTHR SPECIMN AEROBIC: CPT | Performed by: PHYSICIAN ASSISTANT

## 2023-03-29 PROCEDURE — 99214 OFFICE O/P EST MOD 30 MIN: CPT | Performed by: PHYSICIAN ASSISTANT

## 2023-03-29 PROCEDURE — 85652 RBC SED RATE AUTOMATED: CPT

## 2023-03-29 RX ORDER — CEPHALEXIN 500 MG/1
500 CAPSULE ORAL 2 TIMES DAILY
Qty: 14 CAPSULE | Refills: 0 | Status: SHIPPED | OUTPATIENT
Start: 2023-03-29 | End: 2023-04-05

## 2023-03-29 RX ORDER — SULFAMETHOXAZOLE AND TRIMETHOPRIM 800; 160 MG/1; MG/1
1 TABLET ORAL 2 TIMES DAILY
Qty: 14 TABLET | Refills: 0 | Status: SHIPPED | OUTPATIENT
Start: 2023-03-29 | End: 2023-04-05

## 2023-03-29 NOTE — TELEPHONE ENCOUNTER
It sounds like she needs an appointment today to possibly get started on antibiotics. We may be able to order a referral to ID at that time.

## 2023-03-29 NOTE — TELEPHONE ENCOUNTER
Contacted pt's mom (verbal release reviewed) pt was there as well & relayed PCP's message. Pt states she's had this before and IV antibiotics are the only thing that works and she always has to go to infectious disease. Pt states she'll come in if she has to. Pt is scheduled for a visit later today & had no further questions.

## 2023-03-29 NOTE — TELEPHONE ENCOUNTER
Patient's Northera 200 mg BID has been denied by her insurance. They told me that the patient has to submit an appeal in order to try and get the medication approved.    Patient notified and aware.

## 2023-03-29 NOTE — PROGRESS NOTES
"    Chief Complaint   Patient presents with   • referral      Infectious disease   • Boil right thigh near groin area   • boils under arms       HPI     Ammon Vargas is a pleasant 27 y.o. female who presents for evaluation of \"chief complaint.\"     Patient reports a boil came up in her right groin yesterday. Last night it popped and is still draining. She reports multiple similar episodes requiring IV antibiotics through infection disease. She also used to take minocyclin prophylactically but it interacted with her other medications. She recently gave birth about 1 week ago. The patient is not breastfeeding. It has been more than 1 year since she last saw ID. She has seen Dr. Contreras and Dr. Sheehan in the past. She was advised by their office should would need a referral. She had chills last night but no fever.     Past Medical History:   Diagnosis Date   • Asthma 2007   • CRPS (complex regional pain syndrome type I) 2013   • Eosinophilic esophagitis 2021   • Fibromyalgia 2013   • Internal hemorrhoids 02/22/2021   • Migraines 2013   • Morbid obesity with BMI of 45.0-49.9, adult 2022   • MRSA carrier    • POTS (postural orthostatic tachycardia syndrome) 2014   • Recurrent major depressive disorder, in partial remission 2013       Past Surgical History:   Procedure Laterality Date   • COLONOSCOPY     • D & C WITH SUCTION  07/2018   • EAR TUBES  1999   • ENDOSCOPY     • LAPAROSCOPIC CHOLECYSTECTOMY  2011   • TONSILLECTOMY AND ADENOIDECTOMY  2002       Family History   Problem Relation Age of Onset   • No Known Problems Father    • Breast cancer Mother    • Diabetes Mother    • Cancer Paternal Grandfather    • Hearing loss Paternal Grandfather    • Heart attack Paternal Grandfather    • Colon cancer Maternal Grandmother    • Cancer Maternal Grandmother    • Diabetes Maternal Grandmother    • Kidney disease Maternal Grandmother    • Stroke Maternal Grandmother    • Cancer Maternal Grandfather    • Heart attack " Maternal Grandfather    • Hearing loss Maternal Grandfather    • Stroke Maternal Grandfather    • Ovarian cancer Neg Hx    • Uterine cancer Neg Hx        Social History     Socioeconomic History   • Marital status:      Spouse name: Adrian Vargas   Tobacco Use   • Smoking status: Former     Packs/day: 2.00     Years: 7.00     Pack years: 14.00     Types: Cigarettes     Start date:      Quit date: 2017     Years since quittin.2   • Smokeless tobacco: Never   Vaping Use   • Vaping Use: Former   • Quit date: 2022   • Substances: Nicotine   • Devices: Refillable tank   Substance and Sexual Activity   • Alcohol use: Not Currently     Comment: 0-1/week when not pregnant   • Drug use: No   • Sexual activity: Yes     Partners: Male     Birth control/protection: None       Allergies   Allergen Reactions   • Banana Anaphylaxis   • Cantaloupe (Diagnostic) Hives   • Eggs Or Egg-Derived Products Anaphylaxis, Hives and Swelling   • Influenza Virus Vaccine Other (See Comments)     Unknown but has an allergy to eggs   • Mushroom Hives   • Shellfish-Derived Products Hives   • Rizatriptan Rash       ROS    Review of Systems   Constitutional: Positive for chills. Negative for fever.   Skin: Positive for rash and skin lesions.       Vitals:    23 1251   BP: 130/88   Pulse: 66   Temp: 96.9 °F (36.1 °C)   SpO2: 98%     Body mass index is 44.91 kg/m².      Current Outpatient Medications:   •  ibuprofen (ADVIL,MOTRIN) 600 MG tablet, Take 1 tablet by mouth Every 6 (Six) Hours As Needed for Mild Pain, Disp: 30 tablet, Rfl: 0    PE    Physical Exam  Vitals reviewed.   Constitutional:       General: She is not in acute distress.  Pulmonary:      Effort: Pulmonary effort is normal. No respiratory distress.   Abdominal:      Tenderness: There is abdominal tenderness.       Skin:     Findings: Rash present.   Neurological:      Mental Status: She is alert.   Psychiatric:         Mood and Affect: Mood normal.           A/P    Problem List Items Addressed This Visit    None  Visit Diagnoses     Cellulitis of groin    -  Primary  -Hx of MRSA requiring IV abx. STAT referral to ID.   -Cultured abscess. Start keflex, bactrim until evaluated by ID.     Relevant Orders    Ambulatory Referral to Infectious Disease (Completed)    Abscess of groin, right        Relevant Orders    Ambulatory Referral to Infectious Disease (Completed)    Wound Culture - Drainage, Groin (Completed)          Plan of care was reviewed with patient at the conclusion of today's visit. Education was provided regarding diagnoses, management, prescribed or recommended OTC products, and the importance of compliance with follow-up appointments. The patient was counseled regarding the risks, benefits, and possible side-effects of treatment. I advised the patient to keep me informed of any acute changes in their status including new, worsening, or persistent symptoms. Patient expresses understanding and agreement with the management plan.        VERONICA Rosas

## 2023-03-29 NOTE — TELEPHONE ENCOUNTER
Patient's mother is calling on behalf of the patient requesting an urgent referral to infectious disease. Patient is experiencing pain in the right upper thigh / groin area. There is an area that is red, hot to the touch, infected and draining. Patient is one week post partum. Patient is able to be seen with [facility] (Dr. Cameron) if a referral is placed and sent to their office. Please advise.

## 2023-03-31 LAB
BACTERIA SPEC AEROBE CULT: NORMAL
GRAM STN SPEC: NORMAL

## 2023-05-01 ENCOUNTER — POSTPARTUM VISIT (OUTPATIENT)
Dept: OBSTETRICS AND GYNECOLOGY | Facility: CLINIC | Age: 28
End: 2023-05-01
Payer: COMMERCIAL

## 2023-05-01 VITALS
HEIGHT: 71 IN | WEIGHT: 293 LBS | BODY MASS INDEX: 41.02 KG/M2 | DIASTOLIC BLOOD PRESSURE: 86 MMHG | SYSTOLIC BLOOD PRESSURE: 110 MMHG

## 2023-05-01 DIAGNOSIS — F33.41 RECURRENT MAJOR DEPRESSIVE DISORDER, IN PARTIAL REMISSION: ICD-10-CM

## 2023-05-01 DIAGNOSIS — F32.A DEPRESSION AFFECTING PREGNANCY, ANTEPARTUM: ICD-10-CM

## 2023-05-01 DIAGNOSIS — O99.340 DEPRESSION AFFECTING PREGNANCY, ANTEPARTUM: ICD-10-CM

## 2023-05-01 DIAGNOSIS — O99.210 OBESITY IN PREGNANCY, ANTEPARTUM: ICD-10-CM

## 2023-05-01 DIAGNOSIS — O99.019 MATERNAL ANEMIA IN PREGNANCY, ANTEPARTUM: ICD-10-CM

## 2023-05-01 DIAGNOSIS — Z34.80 PRENATAL CARE OF MULTIGRAVIDA, ANTEPARTUM: Primary | ICD-10-CM

## 2023-05-01 PROBLEM — Z34.90 PREGNANCY: Status: RESOLVED | Noted: 2023-03-19 | Resolved: 2023-05-01

## 2023-05-01 PROBLEM — R73.09 ELEVATED GLUCOSE TOLERANCE TEST: Status: RESOLVED | Noted: 2023-01-07 | Resolved: 2023-05-01

## 2023-05-01 RX ORDER — CITALOPRAM 10 MG/1
10 TABLET ORAL DAILY
COMMUNITY

## 2023-05-01 NOTE — PROGRESS NOTES
Chief Complaint   Patient presents with   • Postpartum Care       6 Week Postpartum Visit         Ammon Vargas is a 27 y.o.  who presents today for a 6 week postpartum check.     C/S: no     Vaginal, Spontaneous    Information for the patient's :  More Vargas [[id number]]   3/20/2023   female   More Vargas   3065 g (6 lb 12.1 oz)   Gestational Age: 39w3d          Baby Discharged: Discharged with Mom  Delivering Physician: Paola Call MD    At the time of delivery were you diagnosed with any of the following: None. The laceration was 2nd degree and is healing well. Patient describes vaginal bleeding as absent.  Patient is bottle feeding.  She desires contraceptive methods: None for contraception.  Patient denies bowel or bladder issues.      Patient reports postpartum depression. Postpartum Depression Screening Questionnaire: 12. Pt already sees a therapist and is taking Celexa. Pt denies SI/HI.       Last Pap : 6/3/2021. Results: negative. HPV: not done.   Last Completed Pap Smear          Ordered - PAP SMEAR (Every 3 Years) Ordered on 2021  SCANNED - PAP SMEAR    2019  Done - normal    2018  SCANNED - PAP SMEAR    2018  Done                  The additional following portions of the patient's history were reviewed and updated as appropriate: allergies, current medications, past family history, past medical history, past social history, past surgical history and problem list.    Review of Systems   Constitutional: Negative.    HENT: Negative.    Eyes: Negative.    Respiratory: Negative.    Cardiovascular: Negative.    Gastrointestinal: Negative.    Endocrine: Negative.    Genitourinary: Negative.    Musculoskeletal: Negative.    Skin: Negative.    Allergic/Immunologic: Negative.    Neurological: Negative.    Hematological: Negative.    Psychiatric/Behavioral: Positive for depressed mood. The patient is nervous/anxious (Managed  ").      All other systems reviewed and are negative.     I have reviewed and agree with the HPI, ROS, and historical information as entered above. Paola Call MD    /86   Ht 180.3 cm (71\")   Wt (!) 146 kg (321 lb)   LMP 2022   Breastfeeding No   BMI 44.77 kg/m²     Physical Exam  Vitals and nursing note reviewed. Exam conducted with a chaperone present.   Constitutional:       Appearance: She is well-developed.   HENT:      Head: Normocephalic and atraumatic.   Pulmonary:      Effort: Pulmonary effort is normal.   Abdominal:      Palpations: Abdomen is soft. Abdomen is not rigid.   Genitourinary:     Vagina: No lesions or prolapsed vaginal walls.      Cervix: No lesion or erythema.      Uterus: Enlarged. Not tender and no uterine prolapse.       Adnexa:         Right: No mass, tenderness or fullness.          Left: No mass, tenderness or fullness.     Musculoskeletal:      Cervical back: Normal range of motion.   Neurological:      Mental Status: She is alert and oriented to person, place, and time.   Psychiatric:         Mood and Affect: Mood normal.         Behavior: Behavior normal.             Assessment and Plan    Problem List Items Addressed This Visit        Gravid and      (normal spontaneous vaginal delivery)    Overview     3/20/2023  6 pounds 12 ounces at 39 and 3 weeks baby girl Loco         RESOLVED: Prenatal care, subsequent pregnancy - Primary    Overview     IOL 3/19 at 9:30 PM         RESOLVED: Obesity in pregnancy, antepartum    Overview     Advise 15 to 20 pound weight gain in pregnancy.  We will get early Glucola at 12 weeks.  Advise baby aspirin on weeks 12 through 36  We will get ultrasound at 37 weeks for growth.         RESOLVED: Depression affecting pregnancy, antepartum    Relevant Medications    citalopram (CeleXA) 10 MG tablet       Hematology and Neoplasia    RESOLVED: Maternal anemia in pregnancy, antepartum    Overview     On iron " supplementation.            Mental Health    Recurrent major depressive disorder, in partial remission (HCC)    Relevant Medications    citalopram (CeleXA) 10 MG tablet   Other Visit Diagnoses     Encounter for postpartum visit        Relevant Orders    LIQUID-BASED PAP SMEAR WITH HPV GENOTYPING REGARDLESS OF INTERPRETATION (DEJA,COR,MAD)          1. S/p Vaginal delivery, 6 weeks postpartum.  Doing well.    2. Return to normal physical activity.  No pelvic restrictions.   3. Baby doing well.  4. Patient under the care of her therapist for her depression.  This has been an ongoing problem and not just during pregnancy.  5. Contraception: contraceptive methods: None  6. Return in about 1 year (around 5/1/2024) for Annual physical.     Paola Call MD  05/01/2023

## 2023-05-03 LAB — REF LAB TEST METHOD: NORMAL

## 2023-05-03 RX ORDER — BISOPROLOL FUMARATE 5 MG/1
TABLET, FILM COATED ORAL
Qty: 60 TABLET | Refills: 4 | Status: SHIPPED | OUTPATIENT
Start: 2023-05-03

## 2023-05-03 RX ORDER — FLUDROCORTISONE ACETATE 0.1 MG/1
TABLET ORAL
Qty: 60 TABLET | Refills: 4 | Status: SHIPPED | OUTPATIENT
Start: 2023-05-03

## 2023-05-03 NOTE — TELEPHONE ENCOUNTER
I spoke with patient, she states she is doing well. She states that she never stopped taking them, her OBGYN cleared her to take them through her entire pregnancy.   Ok to fill?

## 2023-05-03 NOTE — TELEPHONE ENCOUNTER
I called patient to see how she is doing and if she is taking these medications. No answer. I left a message for a return call.

## 2023-07-26 ENCOUNTER — OFFICE VISIT (OUTPATIENT)
Dept: CARDIOLOGY | Facility: CLINIC | Age: 28
End: 2023-07-26
Payer: COMMERCIAL

## 2023-07-26 VITALS
HEART RATE: 82 BPM | HEIGHT: 71 IN | WEIGHT: 293 LBS | OXYGEN SATURATION: 95 % | SYSTOLIC BLOOD PRESSURE: 142 MMHG | BODY MASS INDEX: 41.02 KG/M2 | DIASTOLIC BLOOD PRESSURE: 98 MMHG

## 2023-07-26 DIAGNOSIS — G90.A POTS (POSTURAL ORTHOSTATIC TACHYCARDIA SYNDROME): Primary | ICD-10-CM

## 2023-07-26 DIAGNOSIS — R00.0 INAPPROPRIATE SINUS NODE TACHYCARDIA: ICD-10-CM

## 2023-07-26 PROCEDURE — 99213 OFFICE O/P EST LOW 20 MIN: CPT | Performed by: PHYSICIAN ASSISTANT

## 2023-07-26 RX ORDER — CITALOPRAM 20 MG/1
20 TABLET ORAL DAILY
COMMUNITY
Start: 2023-06-06

## 2023-07-26 NOTE — PROGRESS NOTES
Ammon Vargas  1995  [phone]    07/26/2023    [facility] CARDIOLOGY     Referring Provider: No ref. provider found     Eb Prabhakar PA  [address] [facility] [address]    Chief Complaint   Patient presents with    POTS (postural orthostatic tachycardia syndrome)       Problem List:   Syncope  Tilt table study 2014 showing cardioinhibitory and vasodepressor component symptomatic.  Negative event monitor thousand 14 sinus tachycardia up to 100 bpm less strenuous activities such as daily living  Echocardiogram November 7, 2014 EF 53% mild MR mild TR  Initiation of midodrine Celexa November 2014  Hospitalization February 12, 2015 for syncope events with negative CT scan, negative EEG  Initiation of Northera August 2015  Complex regional pain syndrome followed by FRIEDA Swift  Frequent MRSA infection  Migraine headache  obesity  Surgical history  Cholecystectomy  Tonsillectomy  Nasal surgery       Allergies  Allergies   Allergen Reactions    Banana Anaphylaxis    Cantaloupe (Diagnostic) Hives    Eggs Or Egg-Derived Products Anaphylaxis, Hives and Swelling    Influenza Virus Vaccine Other (See Comments)     Unknown but has an allergy to eggs    Mushroom Hives    Shellfish-Derived Products Hives    Rizatriptan Rash       Current Medications    Current Outpatient Medications:     bisoprolol (ZEBeta) 5 MG tablet, TAKE ONE TABLET BY MOUTH TWICE A DAY, Disp: 60 tablet, Rfl: 4    citalopram (CeleXA) 20 MG tablet, Take 1 tablet by mouth Daily., Disp: , Rfl:     fludrocortisone 0.1 MG tablet, TAKE TWO TABLETS BY MOUTH DAILY WITH BREAKFAST, Disp: 60 tablet, Rfl: 4    History of Present Illness     Pt presents for follow up of POTS/IAST. Since we last saw the pt, she is overall doing well on Florinef and Zebeta. She has not had syncope. Her HRs have been good, mostly 65-75 bpm. She has a 4 month old baby and a 3.5 year old at home. She stays home. No CP, syncope. No admissions  "other than to give birth.     ROS:  General:  Denies fatigue, weight gain or loss  Cardiovascular:  Denies CP, PND, syncope, near syncope, edema or palpitations.  Pulmonary:  Denies TERAN, cough, or wheezing      Vitals:    07/26/23 1525   BP: 142/98   BP Location: Left arm   Patient Position: Sitting   Pulse: 82   SpO2: 95%   Weight: (!) 147 kg (325 lb)   Height: 180.3 cm (71\")     Body mass index is 45.33 kg/m².  PE:  General: NAD  Neck: no JVD, no carotid bruits, no TM  Heart RRR, NL S1, S2, S4 present, no rubs, murmurs  Lungs: CTA, no wheezes, rhonchi, or rales  Abd: soft, non-tender, NL BS  Ext: No musculoskeletal deformities, no edema, cyanosis, or clubbing  Psych: normal mood and affect    Diagnostic Data:      Procedures           1. POTS (postural orthostatic tachycardia syndrome)    2. Inappropriate sinus node tachycardia          Plan:  1) POTS  Continue present medications, doing well on Florinef, Zebeta.      2) Inappropriate sinus tachycardia-stable for now.      F/up in 12  months      Electronically signed by VERONICA Cormier, 07/26/23, 3:35 PM EDT.      "

## 2023-08-18 RX ORDER — CETIRIZINE HYDROCHLORIDE 10 MG/1
TABLET ORAL
Qty: 90 TABLET | Refills: 3 | Status: SHIPPED | OUTPATIENT
Start: 2023-08-18

## 2023-08-18 NOTE — TELEPHONE ENCOUNTER
Rx Refill Note  Requested Prescriptions     Pending Prescriptions Disp Refills    cetirizine (zyrTEC) 10 MG tablet [Pharmacy Med Name: CETIRIZINE HCL 10 MG TABLET] 90 tablet      Sig: TAKE ONE TABLET BY MOUTH DAILY      Last office visit with prescribing clinician: 3/29/2023   Last telemedicine visit with prescribing clinician: Visit date not found   Next office visit with prescribing clinician: 9/18/2023                         Would you like a call back once the refill request has been completed: [] Yes [] No    If the office needs to give you a call back, can they leave a voicemail: [] Yes [] No    Yeimi Ramirez MA  08/18/23, 10:16 EDT

## 2023-10-19 ENCOUNTER — OFFICE VISIT (OUTPATIENT)
Dept: FAMILY MEDICINE CLINIC | Facility: CLINIC | Age: 28
End: 2023-10-19
Payer: COMMERCIAL

## 2023-10-19 VITALS
HEART RATE: 96 BPM | DIASTOLIC BLOOD PRESSURE: 90 MMHG | BODY MASS INDEX: 41.02 KG/M2 | TEMPERATURE: 97.8 F | OXYGEN SATURATION: 98 % | HEIGHT: 71 IN | WEIGHT: 293 LBS | SYSTOLIC BLOOD PRESSURE: 122 MMHG

## 2023-10-19 DIAGNOSIS — J40 BRONCHITIS: Primary | ICD-10-CM

## 2023-10-19 PROBLEM — L73.2 HIDRADENITIS SUPPURATIVA: Status: RESOLVED | Noted: 2020-03-20 | Resolved: 2023-10-19

## 2023-10-19 PROBLEM — E66.09 OBESITY DUE TO EXCESS CALORIES: Status: ACTIVE | Noted: 2017-11-20

## 2023-10-19 PROBLEM — Z86.14 PERSONAL HISTORY OF METHICILLIN RESISTANT STAPHYLOCOCCUS AUREUS INFECTION: Status: ACTIVE | Noted: 2019-10-31

## 2023-10-19 PROBLEM — G90.9 AUTONOMIC NEUROPATHY: Status: ACTIVE | Noted: 2023-03-29

## 2023-10-19 LAB
EXPIRATION DATE: NORMAL
EXPIRATION DATE: NORMAL
FLUAV AG UPPER RESP QL IA.RAPID: NOT DETECTED
FLUBV AG UPPER RESP QL IA.RAPID: NOT DETECTED
INTERNAL CONTROL: NORMAL
INTERNAL CONTROL: NORMAL
Lab: NORMAL
Lab: NORMAL
S PYO AG THROAT QL: NEGATIVE
SARS-COV-2 AG UPPER RESP QL IA.RAPID: NOT DETECTED

## 2023-10-19 RX ORDER — FLUTICASONE PROPIONATE 50 MCG
1 SPRAY, SUSPENSION (ML) NASAL DAILY
Qty: 9.9 ML | Refills: 11 | Status: SHIPPED | OUTPATIENT
Start: 2023-10-19

## 2023-10-19 RX ORDER — ALBUTEROL SULFATE 90 UG/1
2 AEROSOL, METERED RESPIRATORY (INHALATION) EVERY 4 HOURS PRN
Qty: 8 G | Refills: 1 | Status: SHIPPED | OUTPATIENT
Start: 2023-10-19

## 2023-10-19 RX ORDER — AZITHROMYCIN 250 MG/1
TABLET, FILM COATED ORAL
Qty: 6 TABLET | Refills: 0 | Status: SHIPPED | OUTPATIENT
Start: 2023-10-19 | End: 2023-10-23

## 2023-10-19 NOTE — PROGRESS NOTES
Office Note     Name: Ammon Vargas    : 1995     MRN: [id number]     Chief Complaint  Cough (All symptoms started monday), Generalized Body Aches, Nasal Congestion, Ear Fullness (Muffled hearing in both ears), Fever, and Sore Throat (Lost her taste and smell)    Subjective     History of Present Illness:  Ammon Vargas is a 28 y.o. female who presents today for Evaluation of upper respiratory infection.  Patient reports that on Monday she began developing a low-grade fever, sore throat, bilateral ill fullness, nasal congestion, generalized body aches, and a dry cough.  She does have immunodeficiency secondary to autonomic dysfunction.  She reports that over the past few days she has been resting, increasing fluid intake, and taking over-the-counter cough syrup and antipyretics with minimal relief.  She states her daughter does have a small cough but otherwise no symptoms.  No other members in the household are sick.  She denies any GI symptoms.  She notes she does have some shortness of breath with ambulation.      Review of Systems:   Review of Systems   Constitutional:  Positive for fatigue and fever.   HENT:  Positive for postnasal drip, rhinorrhea and sore throat.    Respiratory:  Positive for cough and shortness of breath.    Musculoskeletal:  Positive for myalgias.   All other systems reviewed and are negative.      Past Medical History:   Past Medical History:   Diagnosis Date    Asthma 2007    CRPS (complex regional pain syndrome type I)     Eosinophilic esophagitis     Fibromyalgia     Hidradenitis suppurativa 2020    Internal hemorrhoids 2021    Migraines 2013    Morbid obesity with BMI of 45.0-49.9, adult     MRSA carrier     POTS (postural orthostatic tachycardia syndrome) 2014    Recurrent major depressive disorder, in partial remission        Past Surgical History:   Past Surgical History:   Procedure Laterality Date    COLONOSCOPY      D & C  WITH SUCTION  2018    EAR TUBES  1999    ENDOSCOPY      LAPAROSCOPIC CHOLECYSTECTOMY  2011    TONSILLECTOMY AND ADENOIDECTOMY  2002       Family History:   Family History   Problem Relation Age of Onset    No Known Problems Father     Breast cancer Mother     Diabetes Mother     Cancer Paternal Grandfather     Hearing loss Paternal Grandfather     Heart attack Paternal Grandfather     Colon cancer Maternal Grandmother     Cancer Maternal Grandmother     Diabetes Maternal Grandmother     Kidney disease Maternal Grandmother     Stroke Maternal Grandmother     Cancer Maternal Grandfather     Heart attack Maternal Grandfather     Hearing loss Maternal Grandfather     Stroke Maternal Grandfather     Ovarian cancer Neg Hx     Uterine cancer Neg Hx        Social History:   Social History     Socioeconomic History    Marital status:      Spouse name: Adrian Vargas   Tobacco Use    Smoking status: Former     Packs/day: 2.00     Years: 7.00     Additional pack years: 0.00     Total pack years: 14.00     Types: Cigarettes     Start date:      Quit date:      Years since quittin.8     Passive exposure: Past    Smokeless tobacco: Never   Vaping Use    Vaping Use: Former    Quit date: 2022    Substances: Nicotine    Devices: RefEasyPaintble tank   Substance and Sexual Activity    Alcohol use: Not Currently     Comment: 0-1/week when not pregnant    Drug use: No    Sexual activity: Yes     Partners: Male     Birth control/protection: None       Immunizations:   Immunization History   Administered Date(s) Administered    Flu Vaccine Intradermal Quad 18-64YR 12/10/2018    Influenza, Unspecified 2021    MMR 2019    Tdap 2019, 2019, 2023        Medications:     Current Outpatient Medications:     bisoprolol (ZEBeta) 5 MG tablet, TAKE ONE TABLET BY MOUTH TWICE A DAY, Disp: 60 tablet, Rfl: 4    cetirizine (zyrTEC) 10 MG tablet, TAKE ONE TABLET BY MOUTH DAILY, Disp: 90 tablet, Rfl: 3     "citalopram (CeleXA) 20 MG tablet, Take 1 tablet by mouth Daily., Disp: , Rfl:     fludrocortisone 0.1 MG tablet, TAKE TWO TABLETS BY MOUTH DAILY WITH BREAKFAST, Disp: 60 tablet, Rfl: 4    albuterol sulfate  (90 Base) MCG/ACT inhaler, Inhale 2 puffs Every 4 (Four) Hours As Needed for Wheezing or Shortness of Air., Disp: 8 g, Rfl: 1    azithromycin (Zithromax Z-Florencio) 250 MG tablet, Take 2 tablets by mouth Daily for 1 day, THEN 1 tablet Daily for 4 days. Take 2 tablets the first day, then 1 tablet daily for 4 days., Disp: 6 tablet, Rfl: 0    fluticasone (FLONASE) 50 MCG/ACT nasal spray, 1 spray into the nostril(s) as directed by provider Daily., Disp: 9.9 mL, Rfl: 11    Allergies:   Allergies   Allergen Reactions    Banana Anaphylaxis    Cantaloupe (Diagnostic) Hives    Eggs Or Egg-Derived Products Anaphylaxis, Hives and Swelling    Influenza Virus Vaccine Other (See Comments)     Unknown but has an allergy to eggs    Mushroom Hives    Shellfish-Derived Products Hives    Rizatriptan Rash       Objective     Vital Signs  /90   Pulse 96   Temp 97.8 °F (36.6 °C)   Ht 180.3 cm (70.98\")   Wt (!) 143 kg (314 lb 6.4 oz)   SpO2 98%   BMI 43.87 kg/m²   Estimated body mass index is 43.87 kg/m² as calculated from the following:    Height as of this encounter: 180.3 cm (70.98\").    Weight as of this encounter: 143 kg (314 lb 6.4 oz).        Physical Exam  Vitals reviewed.   Constitutional:       Appearance: Normal appearance. She is normal weight.   HENT:      Head: Normocephalic and atraumatic.      Right Ear: Tympanic membrane normal. A middle ear effusion is present.      Left Ear: Tympanic membrane normal. A middle ear effusion is present.      Nose: Congestion present.      Mouth/Throat:      Mouth: Mucous membranes are moist.   Eyes:      Extraocular Movements: Extraocular movements intact.      Pupils: Pupils are equal, round, and reactive to light.   Cardiovascular:      Rate and Rhythm: Normal rate and " regular rhythm.   Pulmonary:      Effort: Pulmonary effort is normal.      Breath sounds: Normal breath sounds. Rhonchi present. No wheezing.   Abdominal:      General: Abdomen is flat.   Musculoskeletal:         General: Normal range of motion.      Cervical back: Normal range of motion.   Skin:     General: Skin is warm.   Neurological:      General: No focal deficit present.      Mental Status: She is alert and oriented to person, place, and time.   Psychiatric:         Mood and Affect: Mood normal.         Behavior: Behavior normal.         Thought Content: Thought content normal.         Judgment: Judgment normal.          Procedures     Results:  Recent Results (from the past 24 hour(s))   POCT SARS-CoV-2 Antigen KYREE + Flu    Collection Time: 10/19/23  1:53 PM    Specimen: Swab   Result Value Ref Range    SARS Antigen Not Detected Not Detected, Presumptive Negative    Influenza A Antigen KYREE Not Detected Not Detected    Influenza B Antigen KYREE Not Detected Not Detected    Internal Control Passed Passed    Lot Number [id number]     Expiration Date 10/27/2024    POCT rapid strep A    Collection Time: 10/19/23  1:54 PM    Specimen: Swab   Result Value Ref Range    Rapid Strep A Screen Negative Negative, VALID, INVALID, Not Performed    Internal Control Passed Passed    Lot Number [id number]     Expiration Date 10/29/2024         Assessment and Plan     Assessment/Plan:  Diagnoses and all orders for this visit:    1. Bronchitis (Primary)  Assessment & Plan:  We will prescribe patient a Z-Florencio, Flonase, and refill her albuterol inhaler.  Patient to continue her prescription of Zyrtec. Recommended supportive care, adequate hydration, and stressed the importance of healthy hygiene habits to avoid spread (I.e. hand washing, social distancing) Advised patient to call clinic if they develop fever or if symptoms worsen/persist.       Orders:  -     POCT SARS-CoV-2 Antigen KYREE + Flu  -     POCT rapid strep A  -     fluticasone  (FLONASE) 50 MCG/ACT nasal spray; 1 spray into the nostril(s) as directed by provider Daily.  Dispense: 9.9 mL; Refill: 11  -     azithromycin (Zithromax Z-Florencio) 250 MG tablet; Take 2 tablets by mouth Daily for 1 day, THEN 1 tablet Daily for 4 days. Take 2 tablets the first day, then 1 tablet daily for 4 days.  Dispense: 6 tablet; Refill: 0  -     albuterol sulfate  (90 Base) MCG/ACT inhaler; Inhale 2 puffs Every 4 (Four) Hours As Needed for Wheezing or Shortness of Air.  Dispense: 8 g; Refill: 1        Follow Up  Return if symptoms worsen or fail to improve.    Lisbeth Vargas DO   [facility]

## 2023-10-19 NOTE — ASSESSMENT & PLAN NOTE
We will prescribe patient a Z-Florencio, Flonase, and refill her albuterol inhaler.  Patient to continue her prescription of Zyrtec. Recommended supportive care, adequate hydration, and stressed the importance of healthy hygiene habits to avoid spread (I.e. hand washing, social distancing) Advised patient to call clinic if they develop fever or if symptoms worsen/persist.

## 2023-12-13 RX ORDER — CITALOPRAM 20 MG/1
20 TABLET ORAL
Qty: 90 TABLET | Refills: 0 | Status: SHIPPED | OUTPATIENT
Start: 2023-12-13

## 2023-12-13 NOTE — TELEPHONE ENCOUNTER
Rx Refill Note  Requested Prescriptions     Pending Prescriptions Disp Refills    citalopram (CeleXA) 20 MG tablet [Pharmacy Med Name: CITALOPRAM HBR 20 MG TABLET] 90 tablet      Sig: TAKE ONE TABLET BY MOUTH EVERY NIGHT AT BEDTIME      Last office visit with prescribing clinician: 3/29/2023   Last telemedicine visit with prescribing clinician: Visit date not found   Next office visit with prescribing clinician: Visit date not found                         Would you like a call back once the refill request has been completed: [] Yes [] No    If the office needs to give you a call back, can they leave a voicemail: [] Yes [] No    Lolita Villegas MA  12/13/23, 09:02 EST

## 2023-12-18 ENCOUNTER — DOCUMENTATION (OUTPATIENT)
Dept: CARDIOLOGY | Facility: CLINIC | Age: 28
End: 2023-12-18
Payer: COMMERCIAL

## 2023-12-19 RX ORDER — BISOPROLOL FUMARATE 5 MG/1
5 TABLET, FILM COATED ORAL 2 TIMES DAILY
Qty: 180 TABLET | Refills: 2 | Status: SHIPPED | OUTPATIENT
Start: 2023-12-19

## 2023-12-19 RX ORDER — FLUDROCORTISONE ACETATE 0.1 MG/1
0.1 TABLET ORAL
Qty: 90 TABLET | Refills: 2 | Status: SHIPPED | OUTPATIENT
Start: 2023-12-19

## 2024-04-17 RX ORDER — CITALOPRAM 20 MG/1
20 TABLET ORAL
Qty: 90 TABLET | Refills: 0 | Status: SHIPPED | OUTPATIENT
Start: 2024-04-17

## 2024-07-17 RX ORDER — CITALOPRAM 20 MG/1
20 TABLET ORAL
Qty: 90 TABLET | Refills: 0 | Status: SHIPPED | OUTPATIENT
Start: 2024-07-17

## 2024-08-22 ENCOUNTER — OFFICE VISIT (OUTPATIENT)
Dept: CARDIOLOGY | Facility: CLINIC | Age: 29
End: 2024-08-22
Payer: COMMERCIAL

## 2024-08-22 VITALS
HEART RATE: 67 BPM | OXYGEN SATURATION: 98 % | WEIGHT: 293 LBS | DIASTOLIC BLOOD PRESSURE: 86 MMHG | SYSTOLIC BLOOD PRESSURE: 116 MMHG | BODY MASS INDEX: 39.68 KG/M2 | HEIGHT: 72 IN

## 2024-08-22 DIAGNOSIS — G90.A POTS (POSTURAL ORTHOSTATIC TACHYCARDIA SYNDROME): Primary | ICD-10-CM

## 2024-08-22 DIAGNOSIS — I47.11 INAPPROPRIATE SINUS NODE TACHYCARDIA: ICD-10-CM

## 2024-08-22 PROCEDURE — 99213 OFFICE O/P EST LOW 20 MIN: CPT | Performed by: PHYSICIAN ASSISTANT

## 2024-08-22 PROCEDURE — 93000 ELECTROCARDIOGRAM COMPLETE: CPT | Performed by: PHYSICIAN ASSISTANT

## 2024-08-22 RX ORDER — BISOPROLOL FUMARATE 10 MG/1
10 TABLET, FILM COATED ORAL DAILY
Qty: 90 TABLET | Refills: 3 | Status: SHIPPED | OUTPATIENT
Start: 2024-08-22

## 2024-08-22 NOTE — PROGRESS NOTES
Ammon Vargas  1995  [phone]      [facility] CARDIOLOGY       Eb Prabhakar PA-C  [address] [address]    Chief Complaint   Patient presents with    POTS (postural orthostatic tachycardia syndrome)       Problem List:   Syncope  Tilt table study 2014 showing cardioinhibitory and vasodepressor component symptomatic.  Negative event monitor thousand 14 sinus tachycardia up to 100 bpm less strenuous activities such as daily living  Echocardiogram November 7, 2014 EF 53% mild MR mild TR  Initiation of midodrine Celexa November 2014  Hospitalization February 12, 2015 for syncope events with negative CT scan, negative EEG  Initiation of Northera August 2015  Complex regional pain syndrome followed by FRIEDA Swift  Frequent MRSA infection  Migraine headache  obesity  Surgical history  Cholecystectomy  Tonsillectomy  Nasal surgery       Allergies  Allergies   Allergen Reactions    Banana Anaphylaxis    Cantaloupe (Diagnostic) Hives    Egg-Derived Products Anaphylaxis, Hives and Swelling    Influenza Virus Vaccine Other (See Comments)     Unknown but has an allergy to eggs    Mushroom Hives    Shellfish-Derived Products Hives    Rizatriptan Rash       Current Medications    Current Outpatient Medications:     albuterol sulfate  (90 Base) MCG/ACT inhaler, Inhale 2 puffs Every 4 (Four) Hours As Needed for Wheezing or Shortness of Air., Disp: 8 g, Rfl: 1    bisoprolol (ZEBeta) 10 MG tablet, Take 1 tablet by mouth Daily., Disp: 90 tablet, Rfl: 3    cetirizine (zyrTEC) 10 MG tablet, TAKE ONE TABLET BY MOUTH DAILY, Disp: 90 tablet, Rfl: 3    citalopram (CeleXA) 20 MG tablet, TAKE 1 TABLET BY MOUTH EVERY NIGHT AT BEDTIME, Disp: 90 tablet, Rfl: 0    fludrocortisone 0.1 MG tablet, Take 1 tablet by mouth Daily With Breakfast., Disp: 90 tablet, Rfl: 2    fluticasone (FLONASE) 50 MCG/ACT nasal spray, 1 spray into the nostril(s) as directed by provider Daily., Disp: 9.9  "mL, Rfl: 11    History of Present Illness     Pt presents for follow up of POTS/IAST.  She was last seen by Camille Hopkins PA-C a year ago.  Since then she is doing overall well on Florinef and Zebeta.  She did have an episode of near syncope this summer when she was outside doing a photo shoot for a wedding she became sleepy dehydrated weak and she had a syncope event.  Since then as long she is hydrating, exercising on top of her routine with exercise she is doing well.      ROS:  General:  Denies fatigue, weight gain or loss  Cardiovascular:  Denies CP, PND, edema or palpitations.  Pulmonary:  Denies TERAN, cough, or wheezing      Vitals:    08/22/24 1306   BP: 116/86   BP Location: Right arm   Patient Position: Sitting   Cuff Size: Adult   Pulse: 67   SpO2: 98%   Weight: (!) 146 kg (322 lb 3.2 oz)   Height: 181.6 cm (71.5\")     Body mass index is 44.31 kg/m².  PE:  General: NAD  Neck: no JVD, no carotid bruits, no TM  Heart RRR, NL S1, S2, S4 present, no rubs, murmurs  Lungs: CTA, no wheezes, rhonchi, or rales  Abd: soft, non-tender, NL BS  Ext: No musculoskeletal deformities, no edema, cyanosis, or clubbing  Psych: normal mood and affect    Diagnostic Data:        ECG 12 Lead    Date/Time: 8/22/2024 1:44 PM  Performed by: Sanjeev Hinton PA    Authorized by: Sanjeev Hinton PA  Comparison: compared with previous ECG from 12/29/2022  Similar to previous ECG  Rhythm: sinus rhythm  Rate: normal  Conduction: conduction normal  ST Segments: ST segments normal  QRS axis: normal    Clinical impression: normal ECG                 1. POTS (postural orthostatic tachycardia syndrome)    2. Inappropriate sinus node tachycardia            Plan:  1) POTS  Continue present medications, doing well on Florinef, Zebeta.      2) Inappropriate sinus tachycardia-stable for now.    3) obesity, BMI 44, diet exercise weight loss.  She is working on walking every night trying to lose weight.    F/up in 12  months    Electronically " signed by VERONICA Ford, 08/22/24, 1:44 PM EDT.

## 2024-09-19 ENCOUNTER — OFFICE VISIT (OUTPATIENT)
Dept: OBSTETRICS AND GYNECOLOGY | Facility: CLINIC | Age: 29
End: 2024-09-19
Payer: COMMERCIAL

## 2024-09-19 VITALS — WEIGHT: 293 LBS | SYSTOLIC BLOOD PRESSURE: 104 MMHG | BODY MASS INDEX: 44.12 KG/M2 | DIASTOLIC BLOOD PRESSURE: 80 MMHG

## 2024-09-19 DIAGNOSIS — N93.9 ABNORMAL UTERINE BLEEDING (AUB): ICD-10-CM

## 2024-09-19 DIAGNOSIS — Z80.3 FAMILY HISTORY OF BREAST CANCER IN MOTHER: ICD-10-CM

## 2024-09-19 DIAGNOSIS — Z01.419 WOMEN'S ANNUAL ROUTINE GYNECOLOGICAL EXAMINATION: Primary | ICD-10-CM

## 2024-09-20 LAB
BASOPHILS # BLD AUTO: 0.06 10*3/MM3 (ref 0–0.2)
BASOPHILS NFR BLD AUTO: 0.5 % (ref 0–1.5)
EOSINOPHIL # BLD AUTO: 0.57 10*3/MM3 (ref 0–0.4)
EOSINOPHIL NFR BLD AUTO: 4.8 % (ref 0.3–6.2)
ERYTHROCYTE [DISTWIDTH] IN BLOOD BY AUTOMATED COUNT: 14.4 % (ref 12.3–15.4)
HCT VFR BLD AUTO: 37.6 % (ref 34–46.6)
HGB BLD-MCNC: 11.9 G/DL (ref 12–15.9)
IMM GRANULOCYTES # BLD AUTO: 0.06 10*3/MM3 (ref 0–0.05)
IMM GRANULOCYTES NFR BLD AUTO: 0.5 % (ref 0–0.5)
LYMPHOCYTES # BLD AUTO: 2.37 10*3/MM3 (ref 0.7–3.1)
LYMPHOCYTES NFR BLD AUTO: 19.8 % (ref 19.6–45.3)
MCH RBC QN AUTO: 24.6 PG (ref 26.6–33)
MCHC RBC AUTO-ENTMCNC: 31.6 G/DL (ref 31.5–35.7)
MCV RBC AUTO: 77.7 FL (ref 79–97)
MONOCYTES # BLD AUTO: 0.72 10*3/MM3 (ref 0.1–0.9)
MONOCYTES NFR BLD AUTO: 6 % (ref 5–12)
NEUTROPHILS # BLD AUTO: 8.18 10*3/MM3 (ref 1.7–7)
NEUTROPHILS NFR BLD AUTO: 68.4 % (ref 42.7–76)
NRBC BLD AUTO-RTO: 0 /100 WBC (ref 0–0.2)
PLATELET # BLD AUTO: 436 10*3/MM3 (ref 140–450)
RBC # BLD AUTO: 4.84 10*6/MM3 (ref 3.77–5.28)
TSH SERPL DL<=0.005 MIU/L-ACNC: 2.07 UIU/ML (ref 0.27–4.2)
WBC # BLD AUTO: 11.96 10*3/MM3 (ref 3.4–10.8)

## 2024-10-08 ENCOUNTER — OFFICE VISIT (OUTPATIENT)
Dept: OBSTETRICS AND GYNECOLOGY | Facility: CLINIC | Age: 29
End: 2024-10-08
Payer: COMMERCIAL

## 2024-10-08 VITALS — HEIGHT: 72 IN | WEIGHT: 293 LBS | BODY MASS INDEX: 39.68 KG/M2

## 2024-10-08 DIAGNOSIS — N93.9 ABNORMAL UTERINE BLEEDING (AUB): Primary | ICD-10-CM

## 2024-10-08 LAB
B-HCG UR QL: NEGATIVE
EXPIRATION DATE: NORMAL
INTERNAL NEGATIVE CONTROL: NORMAL
INTERNAL POSITIVE CONTROL: NORMAL
Lab: NORMAL

## 2024-10-08 NOTE — PROGRESS NOTES
Chief Complaint   Patient presents with    abnormal uterine bleeding       CC:  AUB    Subjective   HPI  Ammon Vargas is a 29 y.o. female, , Patient's last menstrual period was 10/01/2024 (exact date)..  Pt was last here for her annual on 2024. She presents for evaluation of abnormal uterine bleeding. Her periods occur every 28, lasting 5-7 days.  The flow is heavy (more than a pad/hr for 3 days) then light for the rest of her periods. Patient reports that she had 2 periods last month. Patient states the first one was was 2024 that she would consider normal and then she began another period 2024 that lasted into first week September. Patient states that it was heavier than a pad/hr.  Pt states she had another period 10/1/2024-10/6/2024, heavy flow.      In the past the patient has tried birth control pills/Nuvaring and Nexplanon  with success. The patient has  been evaluated:  No.  The patient reports additional symptoms as LLQ pain and increased pelvic pressure(only while sitting on toilet) for about past 2 weeks.     US done today: Yes.  Findings showed normal pelvic ultrasound..  I have personally evaluated the U/S and agree with the findings. Paola Call MD        Additional OB/GYN History   Last Pap : 2023 neg, HPV neg  Last Completed Pap Smear            PAP SMEAR (Every 3 Years) Next due on 2023  LIQUID-BASED PAP SMEAR WITH HPV GENOTYPING REGARDLESS OF INTERPRETATION (DEJA,COR,MAD)    2021  SCANNED - PAP SMEAR    2019  Done - normal    2018  SCANNED - PAP SMEAR    2018  Done    Only the first 5 history entries have been loaded, but more history exists.                      Current Outpatient Medications:     albuterol sulfate  (90 Base) MCG/ACT inhaler, Inhale 2 puffs Every 4 (Four) Hours As Needed for Wheezing or Shortness of Air., Disp: 8 g, Rfl: 1    bisoprolol (ZEBeta) 10 MG tablet, Take 1 tablet by  mouth Daily., Disp: 90 tablet, Rfl: 3    cetirizine (zyrTEC) 10 MG tablet, TAKE ONE TABLET BY MOUTH DAILY, Disp: 90 tablet, Rfl: 3    citalopram (CeleXA) 20 MG tablet, TAKE 1 TABLET BY MOUTH EVERY NIGHT AT BEDTIME, Disp: 90 tablet, Rfl: 0    fludrocortisone 0.1 MG tablet, Take 1 tablet by mouth Daily With Breakfast., Disp: 90 tablet, Rfl: 2    fluticasone (FLONASE) 50 MCG/ACT nasal spray, 1 spray into the nostril(s) as directed by provider Daily., Disp: 9.9 mL, Rfl: 11    Probiotic Product (PROBIOTIC PO), Take  by mouth., Disp: , Rfl:      Past Medical History:   Diagnosis Date    Asthma 2007    CRPS (complex regional pain syndrome type I) 2013    Eosinophilic esophagitis 2021    Fibromyalgia 2013    Hidradenitis suppurativa 03/20/2020    Internal hemorrhoids 02/22/2021    Migraines 2013    Morbid obesity with BMI of 45.0-49.9, adult 2022    MRSA carrier     POTS (postural orthostatic tachycardia syndrome) 2014    Recurrent major depressive disorder, in partial remission 2013        Past Surgical History:   Procedure Laterality Date    COLONOSCOPY  2020    D & C WITH SUCTION  07/2018    EAR TUBES  1999    ENDOSCOPY      LAPAROSCOPIC CHOLECYSTECTOMY  2011    TONSILLECTOMY AND ADENOIDECTOMY  2002       The additional following portions of the patient's history were reviewed and updated as appropriate: allergies, current medications, past family history, past medical history, past social history, past surgical history, and problem list.    Review of Systems   Constitutional: Negative.    HENT: Negative.     Eyes: Negative.    Respiratory: Negative.     Cardiovascular: Negative.    Gastrointestinal: Negative.    Endocrine: Negative.    Genitourinary:  Positive for menstrual problem and pelvic pain.   Musculoskeletal: Negative.    Skin: Negative.    Allergic/Immunologic: Negative.    Neurological: Negative.    Hematological: Negative.    Psychiatric/Behavioral: Negative.         I have reviewed and agree with the HPI,  "ROS, and historical information as entered above. Paola Call MD      Objective   Ht 181.6 cm (71.5\")   Wt (!) 144 kg (317 lb)   LMP 10/01/2024 (Exact Date)   BMI 43.60 kg/m²       Assessment & Plan     Assessment     Endometrial Biopsy      Indications:NAME@ is a 29 y.o. , who presents today for endometrial biopsy.  The patient was noted to have Abnormal Uterine Bleeding.  Her LMP is Patient's last menstrual period was 10/01/2024 (exact date). .    After being presented with the risk, benefits, and specific detail of the procedure, the patient wished to proceed.  Written consent was obtained from patient.   Urine pregnancy test was Negative. Patient does have an allergy to shellfish but not betadine. Pt states she is okay with betadine, has used before.      Procedure Details     Time out: immediate members of the procedure team and patient agree to the following: correct patient, correct site, correct procedure to be performed. Paola Call MD      The patient was placed on the table in the dorsal lithotomy position.  She was draped in the appropriate manner.  A speculum was placed in the vagina.  The cervix was visualized and prepped with Betadine.  A tenaculum was placed on the anterior lip of the cervix for traction.  A small plastic 5 mm Pipelle syringe curette was inserted into the cervical canal.  The uterus was sounded to 7 cm's.  A vigorous four quadrant biopsy was performed, removing a medium amount of tissue.  The tissue was placed in Formalin and sent to Pathology.  The patient tolerated the procedure well and she reported moderate cramping.  The tenaculum was removed from the cervix and the speculum was removed.  The patient was observed for 10 minutes.           Complications: none.     Endometrial Biopsy    Date/Time: 10/8/2024 12:10 PM    Performed by: Paola Call MD  Authorized by: Paola Call MD    Consent:     Consent obtained: written    Consent " given by: patient    Risks discussed: bleeding and infection    Alternatives discussed: observation  Indications:     Indications: abnormal uterine bleeding      Chronicity of post-menopausal bleeding: new  Pre-procedure:     Urine pregnancy test: negative    Procedure:     Prepped with: Betadine    Tenaculum used: yes      A local block was performed: no    Findings:     Cervix: normal      Specimen collected: specimen collected and sent to pathology      Patient tolerance: tolerated well, no immediate complications      Physical Exam  Vitals and nursing note reviewed. Exam conducted with a chaperone present.   Genitourinary:     General: Normal vulva.      Exam position: Lithotomy position.      Labia:         Right: No rash, tenderness or lesion.         Left: No rash, tenderness or lesion.       Urethra: No urethral pain, urethral swelling or urethral lesion.      Vagina: Normal. No tenderness or lesions.      Cervix: No cervical motion tenderness, discharge, lesion or cervical bleeding.      Uterus: Normal. Not enlarged, not fixed and not tender.       Adnexa:         Right: No mass, tenderness or fullness.          Left: No mass, tenderness or fullness.        Rectum: No external hemorrhoid.      Comments: Chaperone Present        Post procedural instructions:  Call the office in 5 business days for biopsy results.  Patient instructed to call the office if develops a fever of 100.4 or greater, vaginal bleeding heavier than a period, foul vaginal discharge or pain.       Problem List Items Addressed This Visit          Genitourinary and Reproductive     Abnormal uterine bleeding (AUB) - Primary    Overview     9/19/2024-patient having abnormal uterine bleeding.  This occurred only once in August where she had 2 periods in 1 month.  We discussed etiologies.  Will get a CBC and TSH today.  Patient has never had an abnormal Pap smear and cervix is normal on exam.  Will get an endometrial biopsy along with an  ultrasound.  Discussed treatment with progestin only pills versus IUD.  Patient is not a candidate for estrogen as she has had history of migraine with aura.  We discussed weight loss may help the bleeding as well.  Will reassess with ultrasound and discuss treatment option at that time.  10/8/24 Reviewed Hgb 11.9 and normal TSH.  Ultrasound within normal limits.  Endometrial biopsy performed.  Discussed treatment options.  As patient has only had this occur 1 time we will the plan is to keep continue to watch for now if all testing is normal.  If it returns then patient will consider progestin only pills.         Relevant Orders    POC Pregnancy, Urine (Completed)         Plan     Call for heavy bleeding  Lab(s) Ordered  Return if symptoms worsen or fail to improve, for Annual physical.        Paola Call MD  10/08/2024

## 2024-10-09 LAB — REF LAB TEST METHOD: NORMAL

## 2024-10-15 ENCOUNTER — TELEPHONE (OUTPATIENT)
Dept: OBSTETRICS AND GYNECOLOGY | Facility: CLINIC | Age: 29
End: 2024-10-15
Payer: COMMERCIAL

## 2024-10-15 NOTE — TELEPHONE ENCOUNTER
Provider:  DR HICKEY    Caller:CIARAN CHARLTON    Phone Number:[phone]     Reason for Call:WOULD LIKE SOMEONE TO FOLLOW UP TO GO OVER THE LAB FOR HER BIOPSY//PLEASE FOLLOW UP

## 2024-10-23 RX ORDER — CITALOPRAM HYDROBROMIDE 20 MG/1
20 TABLET ORAL
Qty: 90 TABLET | Refills: 0 | Status: SHIPPED | OUTPATIENT
Start: 2024-10-23

## 2024-10-23 NOTE — TELEPHONE ENCOUNTER
Rx Refill Note  Requested Prescriptions     Pending Prescriptions Disp Refills    citalopram (CeleXA) 20 MG tablet [Pharmacy Med Name: CITALOPRAM HBR 20 MG TABLET] 90 tablet 0     Sig: TAKE 1 TABLET BY MOUTH EVERY NIGHT AT BEDTIME      Last office visit with prescribing clinician: 3/29/2023   Last telemedicine visit with prescribing clinician: Visit date not found   Next office visit with prescribing clinician: Visit date not found                         Would you like a call back once the refill request has been completed: [] Yes [] No    If the office needs to give you a call back, can they leave a voicemail: [] Yes [] No    Taylor Crowder MA  10/23/24, 13:13 EDT

## 2024-10-24 RX ORDER — FLUDROCORTISONE ACETATE 0.1 MG/1
0.1 TABLET ORAL
Qty: 90 TABLET | Refills: 2 | Status: SHIPPED | OUTPATIENT
Start: 2024-10-24

## 2024-10-24 RX ORDER — BISOPROLOL FUMARATE 10 MG/1
10 TABLET, FILM COATED ORAL DAILY
Qty: 90 TABLET | Refills: 3 | Status: SHIPPED | OUTPATIENT
Start: 2024-10-24

## 2024-10-29 DIAGNOSIS — N93.9 ABNORMAL UTERINE BLEEDING (AUB): Primary | ICD-10-CM

## 2024-10-29 RX ORDER — ACETAMINOPHEN AND CODEINE PHOSPHATE 120; 12 MG/5ML; MG/5ML
1 SOLUTION ORAL DAILY
Qty: 28 TABLET | Refills: 12 | Status: SHIPPED | OUTPATIENT
Start: 2024-10-29 | End: 2025-10-29

## 2024-12-19 ENCOUNTER — HOSPITAL ENCOUNTER (EMERGENCY)
Facility: HOSPITAL | Age: 29
Discharge: HOME | End: 2024-12-19
Attending: STUDENT IN AN ORGANIZED HEALTH CARE EDUCATION/TRAINING PROGRAM
Payer: COMMERCIAL

## 2024-12-19 VITALS
DIASTOLIC BLOOD PRESSURE: 94 MMHG | OXYGEN SATURATION: 99 % | BODY MASS INDEX: 41.02 KG/M2 | HEIGHT: 71 IN | HEART RATE: 54 BPM | TEMPERATURE: 98.8 F | WEIGHT: 293 LBS | SYSTOLIC BLOOD PRESSURE: 142 MMHG | RESPIRATION RATE: 18 BRPM

## 2024-12-19 DIAGNOSIS — T78.40XA ALLERGIC REACTION, INITIAL ENCOUNTER: ICD-10-CM

## 2024-12-19 DIAGNOSIS — L50.9 URTICARIA: Primary | ICD-10-CM

## 2024-12-19 PROCEDURE — 25010000002 HYDROXYZINE PER 25 MG

## 2024-12-19 PROCEDURE — 96372 THER/PROPH/DIAG INJ SC/IM: CPT

## 2024-12-19 PROCEDURE — 99283 EMERGENCY DEPT VISIT LOW MDM: CPT

## 2024-12-19 RX ORDER — DOXEPIN HYDROCHLORIDE 50 MG/1
50 CAPSULE ORAL 2 TIMES DAILY
Qty: 14 CAPSULE | Refills: 0 | Status: SHIPPED | OUTPATIENT
Start: 2024-12-19

## 2024-12-19 RX ORDER — CETIRIZINE HYDROCHLORIDE 10 MG/1
40 TABLET ORAL ONCE
Status: COMPLETED | OUTPATIENT
Start: 2024-12-19 | End: 2024-12-19

## 2024-12-19 RX ORDER — FAMOTIDINE 20 MG/1
20 TABLET, FILM COATED ORAL ONCE
Status: COMPLETED | OUTPATIENT
Start: 2024-12-19 | End: 2024-12-19

## 2024-12-19 RX ORDER — HYDROXYZINE HYDROCHLORIDE 50 MG/ML
50 INJECTION, SOLUTION INTRAMUSCULAR ONCE
Status: COMPLETED | OUTPATIENT
Start: 2024-12-19 | End: 2024-12-19

## 2024-12-19 RX ORDER — DOXEPIN HYDROCHLORIDE 25 MG/1
50 CAPSULE ORAL ONCE
Status: COMPLETED | OUTPATIENT
Start: 2024-12-19 | End: 2024-12-19

## 2024-12-19 RX ORDER — FAMOTIDINE 20 MG/1
20 TABLET, FILM COATED ORAL 2 TIMES DAILY PRN
Qty: 30 TABLET | Refills: 0 | Status: SHIPPED | OUTPATIENT
Start: 2024-12-19

## 2024-12-19 RX ADMIN — CETIRIZINE HYDROCHLORIDE 40 MG: 10 TABLET, FILM COATED ORAL at 16:16

## 2024-12-19 RX ADMIN — FAMOTIDINE 20 MG: 20 TABLET, FILM COATED ORAL at 17:07

## 2024-12-19 RX ADMIN — HYDROXYZINE HYDROCHLORIDE 50 MG: 50 INJECTION, SOLUTION INTRAMUSCULAR at 17:07

## 2024-12-19 RX ADMIN — DOXEPIN HYDROCHLORIDE 50 MG: 25 CAPSULE ORAL at 17:07

## 2024-12-19 NOTE — ED PROVIDER NOTES
Subjective   History of Present Illness patient describes a very itchy rash in the face and neck for the last 2 weeks.  Unsure of particular contact irritant or other allergic response, numerous food related allergies, young children in the home, one of the children could have touched an egg and touched her.  Previous history of similar rashes, reports difficulty sleeping due to the itching.    Review of Systems   Constitutional: Negative.    Respiratory: Negative.     Cardiovascular: Negative.    Gastrointestinal: Negative.    Genitourinary: Negative.    Musculoskeletal: Negative.    Skin:  Positive for rash.   Neurological: Negative.    Psychiatric/Behavioral:  Positive for sleep disturbance.         Endorses she is having having trouble sleeping due to the pruritus       Past Medical History:   Diagnosis Date    Asthma 2007    CRPS (complex regional pain syndrome type I) 2013    Eosinophilic esophagitis 2021    Fibromyalgia 2013    Hidradenitis suppurativa 03/20/2020    Internal hemorrhoids 02/22/2021    Migraines 2013    Morbid obesity with BMI of 45.0-49.9, adult 2022    MRSA carrier     POTS (postural orthostatic tachycardia syndrome) 2014    Recurrent major depressive disorder, in partial remission 2013       Allergies   Allergen Reactions    Banana Anaphylaxis    Cantaloupe (Diagnostic) Hives    Egg-Derived Products Anaphylaxis, Hives and Swelling    Influenza Virus Vaccine Other (See Comments)     Unknown but has an allergy to eggs    Mushroom Hives    Shellfish-Derived Products Hives    Rizatriptan Rash       Past Surgical History:   Procedure Laterality Date    COLONOSCOPY  2020    D & C WITH SUCTION  07/2018    EAR TUBES  1999    ENDOSCOPY      LAPAROSCOPIC CHOLECYSTECTOMY  2011    TONSILLECTOMY AND ADENOIDECTOMY  2002       Family History   Problem Relation Age of Onset    No Known Problems Father     Breast cancer Mother     Diabetes Mother     Cancer Paternal Grandfather     Hearing loss Paternal  Grandfather     Heart attack Paternal Grandfather     Colon cancer Maternal Grandmother     Cancer Maternal Grandmother     Diabetes Maternal Grandmother     Kidney disease Maternal Grandmother     Stroke Maternal Grandmother     Cancer Maternal Grandfather     Heart attack Maternal Grandfather     Hearing loss Maternal Grandfather     Stroke Maternal Grandfather     Ovarian cancer Neg Hx     Uterine cancer Neg Hx        Social History     Socioeconomic History    Marital status:      Spouse name: Adrian Vargas   Tobacco Use    Smoking status: Former     Current packs/day: 0.00     Average packs/day: 2.0 packs/day for 7.0 years (14.0 ttl pk-yrs)     Types: Cigarettes     Start date:      Quit date:      Years since quittin.9     Passive exposure: Past    Smokeless tobacco: Never   Vaping Use    Vaping status: Former    Quit date: 2022    Substances: Nicotine    Devices: Refillable tank   Substance and Sexual Activity    Alcohol use: Not Currently     Comment: 0-1/week when not pregnant    Drug use: No    Sexual activity: Yes     Partners: Male     Birth control/protection: Vasectomy           Objective   Physical Exam  Constitutional:       Appearance: Normal appearance. She is obese. She is not ill-appearing or toxic-appearing.   Eyes:      Extraocular Movements: Extraocular movements intact.      Pupils: Pupils are equal, round, and reactive to light.   Cardiovascular:      Rate and Rhythm: Normal rate.   Pulmonary:      Effort: Pulmonary effort is normal.      Breath sounds: Normal breath sounds.   Musculoskeletal:         General: Normal range of motion.      Cervical back: Normal range of motion and neck supple. No tenderness.   Skin:     General: Skin is warm and dry.      Capillary Refill: Capillary refill takes less than 2 seconds.      Findings: Rash present. Rash is urticarial.      Comments: See uploaded clinical media photograph   Neurological:      General: No focal deficit  present.      Mental Status: She is alert and oriented to person, place, and time.         Procedures                     ED Course  ED Course as of 12/19/24 2039   Thu Dec 19, 2024   3410 Evaluation of the patient results waiting room 2, accompanied by her mother. []   173 The patient reevaluated and results 2, she reports the itching has completely resolved and she feels much better, will plan for discharge. [JH]      ED Course User Index  [] Matt Uriarte APRN                                                       Medical Decision Making  Given the patient's complaints, her previous history of food allergens, other irritants, make-up and moisturizing wipe responses, my differential includes contact dermatitis, hypersensitivity reaction, urticaria with pruritus.  Patient will have multimodal antihistamine and into pruritic medications administered, with reevaluation for improvement.  Patient is agreeable with this plan.    Problems Addressed:  Allergic reaction, initial encounter: complicated acute illness or injury  Urticaria: complicated acute illness or injury    Risk  OTC drugs.  Prescription drug management.        Final diagnoses:   Urticaria   Allergic reaction, initial encounter       ED Disposition  ED Disposition       ED Disposition   Discharge    Condition   Stable    Comment   --               Eb Prabhakar, JESSICA  [address] [address]  [phone]      As needed    Fanny Rodriguez MD PhD  [address]    [place] [address]  [phone]               Medication List        New Prescriptions      doxepin 50 MG capsule  Commonly known as: SINEquan  Take 1 capsule by mouth 2 (Two) Times a Day.               Where to Get Your Medications        These medications were sent to [facility] [id number] - [place], [place] - [address] [place] [address] [address] - [phone]  - [phone] [address] [place] [id number]      Phone: [phone]    doxepin 50 MG capsule            Matt Uriarte, APRN  12/19/24 0129

## 2025-01-16 ENCOUNTER — HOSPITAL ENCOUNTER (EMERGENCY)
Facility: HOSPITAL | Age: 30
Discharge: HOME | End: 2025-01-16
Attending: EMERGENCY MEDICINE
Payer: COMMERCIAL

## 2025-01-16 ENCOUNTER — APPOINTMENT (OUTPATIENT)
Dept: GENERAL RADIOLOGY | Facility: HOSPITAL | Age: 30
End: 2025-01-16
Payer: COMMERCIAL

## 2025-01-16 VITALS
WEIGHT: 293 LBS | SYSTOLIC BLOOD PRESSURE: 140 MMHG | TEMPERATURE: 98.7 F | HEART RATE: 90 BPM | DIASTOLIC BLOOD PRESSURE: 96 MMHG | OXYGEN SATURATION: 100 % | BODY MASS INDEX: 41.02 KG/M2 | HEIGHT: 71 IN | RESPIRATION RATE: 20 BRPM

## 2025-01-16 DIAGNOSIS — Z87.39 HISTORY OF FIBROMYALGIA: ICD-10-CM

## 2025-01-16 DIAGNOSIS — J10.1 INFLUENZA A: Primary | ICD-10-CM

## 2025-01-16 DIAGNOSIS — B34.9 ACUTE VIRAL SYNDROME: ICD-10-CM

## 2025-01-16 DIAGNOSIS — G90.A POTS (POSTURAL ORTHOSTATIC TACHYCARDIA SYNDROME): ICD-10-CM

## 2025-01-16 DIAGNOSIS — G90.59 COMPLEX REGIONAL PAIN SYNDROME TYPE 1 AFFECTING OTHER SITE: ICD-10-CM

## 2025-01-16 LAB
ALBUMIN SERPL-MCNC: 3.9 G/DL (ref 3.5–5.2)
ALBUMIN/GLOB SERPL: 1.3 G/DL
ALP SERPL-CCNC: 126 U/L (ref 39–117)
ALT SERPL W P-5'-P-CCNC: 47 U/L (ref 1–33)
ANION GAP SERPL CALCULATED.3IONS-SCNC: 14 MMOL/L (ref 5–15)
AST SERPL-CCNC: 68 U/L (ref 1–32)
BASOPHILS # BLD AUTO: 0.01 10*3/MM3 (ref 0–0.2)
BASOPHILS NFR BLD AUTO: 0.4 % (ref 0–1.5)
BILIRUB SERPL-MCNC: 0.4 MG/DL (ref 0–1.2)
BUN SERPL-MCNC: 5 MG/DL (ref 6–20)
BUN/CREAT SERPL: 6.5 (ref 7–25)
CALCIUM SPEC-SCNC: 8.8 MG/DL (ref 8.6–10.5)
CHLORIDE SERPL-SCNC: 101 MMOL/L (ref 98–107)
CO2 SERPL-SCNC: 26 MMOL/L (ref 22–29)
CREAT SERPL-MCNC: 0.77 MG/DL (ref 0.57–1)
DEPRECATED RDW RBC AUTO: 39.8 FL (ref 37–54)
EGFRCR SERPLBLD CKD-EPI 2021: 107.2 ML/MIN/1.73
EOSINOPHIL # BLD AUTO: 0.07 10*3/MM3 (ref 0–0.4)
EOSINOPHIL NFR BLD AUTO: 2.8 % (ref 0.3–6.2)
ERYTHROCYTE [DISTWIDTH] IN BLOOD BY AUTOMATED COUNT: 14.5 % (ref 12.3–15.4)
FLUAV RNA RESP QL NAA+PROBE: DETECTED
FLUBV RNA RESP QL NAA+PROBE: NOT DETECTED
GLOBULIN UR ELPH-MCNC: 3.1 GM/DL
GLUCOSE SERPL-MCNC: 119 MG/DL (ref 65–99)
HCT VFR BLD AUTO: 37.2 % (ref 34–46.6)
HGB BLD-MCNC: 11.7 G/DL (ref 12–15.9)
HOLD SPECIMEN: NORMAL
IMM GRANULOCYTES # BLD AUTO: 0.01 10*3/MM3 (ref 0–0.05)
IMM GRANULOCYTES NFR BLD AUTO: 0.4 % (ref 0–0.5)
LIPASE SERPL-CCNC: 17 U/L (ref 13–60)
LYMPHOCYTES # BLD AUTO: 0.79 10*3/MM3 (ref 0.7–3.1)
LYMPHOCYTES NFR BLD AUTO: 31.6 % (ref 19.6–45.3)
MCH RBC QN AUTO: 24.1 PG (ref 26.6–33)
MCHC RBC AUTO-ENTMCNC: 31.5 G/DL (ref 31.5–35.7)
MCV RBC AUTO: 76.7 FL (ref 79–97)
MONOCYTES # BLD AUTO: 0.17 10*3/MM3 (ref 0.1–0.9)
MONOCYTES NFR BLD AUTO: 6.8 % (ref 5–12)
NEUTROPHILS NFR BLD AUTO: 1.45 10*3/MM3 (ref 1.7–7)
NEUTROPHILS NFR BLD AUTO: 58 % (ref 42.7–76)
NRBC BLD AUTO-RTO: 0 /100 WBC (ref 0–0.2)
PLATELET # BLD AUTO: 225 10*3/MM3 (ref 140–450)
PMV BLD AUTO: 9.7 FL (ref 6–12)
POTASSIUM SERPL-SCNC: 3.5 MMOL/L (ref 3.5–5.2)
PROT SERPL-MCNC: 7 G/DL (ref 6–8.5)
RBC # BLD AUTO: 4.85 10*6/MM3 (ref 3.77–5.28)
SARS-COV-2 RNA RESP QL NAA+PROBE: NOT DETECTED
SODIUM SERPL-SCNC: 141 MMOL/L (ref 136–145)
WBC NRBC COR # BLD AUTO: 2.5 10*3/MM3 (ref 3.4–10.8)
WHOLE BLOOD HOLD COAG: NORMAL
WHOLE BLOOD HOLD SPECIMEN: NORMAL

## 2025-01-16 PROCEDURE — 80053 COMPREHEN METABOLIC PANEL: CPT | Performed by: PHYSICIAN ASSISTANT

## 2025-01-16 PROCEDURE — 93005 ELECTROCARDIOGRAM TRACING: CPT | Performed by: EMERGENCY MEDICINE

## 2025-01-16 PROCEDURE — 94799 UNLISTED PULMONARY SVC/PX: CPT

## 2025-01-16 PROCEDURE — 99284 EMERGENCY DEPT VISIT MOD MDM: CPT

## 2025-01-16 PROCEDURE — 36415 COLL VENOUS BLD VENIPUNCTURE: CPT

## 2025-01-16 PROCEDURE — 71045 X-RAY EXAM CHEST 1 VIEW: CPT

## 2025-01-16 PROCEDURE — 94640 AIRWAY INHALATION TREATMENT: CPT

## 2025-01-16 PROCEDURE — 96374 THER/PROPH/DIAG INJ IV PUSH: CPT

## 2025-01-16 PROCEDURE — 83690 ASSAY OF LIPASE: CPT | Performed by: PHYSICIAN ASSISTANT

## 2025-01-16 PROCEDURE — 85025 COMPLETE CBC W/AUTO DIFF WBC: CPT | Performed by: PHYSICIAN ASSISTANT

## 2025-01-16 PROCEDURE — 87636 SARSCOV2 & INF A&B AMP PRB: CPT | Performed by: EMERGENCY MEDICINE

## 2025-01-16 PROCEDURE — 25010000002 ONDANSETRON PER 1 MG: Performed by: PHYSICIAN ASSISTANT

## 2025-01-16 PROCEDURE — 25810000003 SODIUM CHLORIDE 0.9 % SOLUTION: Performed by: PHYSICIAN ASSISTANT

## 2025-01-16 PROCEDURE — 93005 ELECTROCARDIOGRAM TRACING: CPT | Performed by: STUDENT IN AN ORGANIZED HEALTH CARE EDUCATION/TRAINING PROGRAM

## 2025-01-16 RX ORDER — ALBUTEROL SULFATE 0.83 MG/ML
2.5 SOLUTION RESPIRATORY (INHALATION) ONCE
Status: COMPLETED | OUTPATIENT
Start: 2025-01-16 | End: 2025-01-16

## 2025-01-16 RX ORDER — BENZONATATE 100 MG/1
100 CAPSULE ORAL 3 TIMES DAILY PRN
Qty: 15 CAPSULE | Refills: 0 | Status: SHIPPED | OUTPATIENT
Start: 2025-01-16 | End: 2025-01-21

## 2025-01-16 RX ORDER — ONDANSETRON 2 MG/ML
4 INJECTION INTRAMUSCULAR; INTRAVENOUS ONCE
Status: COMPLETED | OUTPATIENT
Start: 2025-01-16 | End: 2025-01-16

## 2025-01-16 RX ORDER — DEXTROMETHORPHAN HYDROBROMIDE AND PROMETHAZINE HYDROCHLORIDE 15; 6.25 MG/5ML; MG/5ML
5 SYRUP ORAL 4 TIMES DAILY PRN
Qty: 100 ML | Refills: 0 | Status: SHIPPED | OUTPATIENT
Start: 2025-01-16 | End: 2025-01-21

## 2025-01-16 RX ORDER — ALBUTEROL SULFATE 90 UG/1
INHALANT RESPIRATORY (INHALATION)
Status: DISCONTINUED
Start: 2025-01-16 | End: 2025-01-17 | Stop reason: HOSPADM

## 2025-01-16 RX ADMIN — ALBUTEROL SULFATE 2.5 MG: 2.5 SOLUTION RESPIRATORY (INHALATION) at 22:27

## 2025-01-16 RX ADMIN — SODIUM CHLORIDE 1000 ML: 9 INJECTION, SOLUTION INTRAVENOUS at 21:41

## 2025-01-16 RX ADMIN — ONDANSETRON 4 MG: 2 INJECTION INTRAMUSCULAR; INTRAVENOUS at 21:42

## 2025-01-17 NOTE — DISCHARGE INSTRUCTIONS
Symptomatic care is recommended with Tylenol and/or ibuprofen as needed for pain and fever. Take all medications as prescribed and instructed. Follow up with primary care as directed or return to Emergency Department with worsening of symptoms.

## 2025-01-17 NOTE — ED PROVIDER NOTES
EMERGENCY DEPARTMENT ENCOUNTER    Pt Name: Ammon Vargas  MRN: [id number]  Pt :   1995  Room Number:  24SF/24  Date of encounter:  2025  PCP: Eb Prabhakar PA-C  ED Provider: VERONICA Peterson    Historian: Patient    HPI:  Chief Complaint: Flu-Like Symptoms    Context: Ammon Vargas is a 29 y.o. female who presents to the ED c/o flu-like symptoms.     History provided by:  Patient  Flu Symptoms  Presenting symptoms: cough, diarrhea, fatigue, myalgias, nausea, rhinorrhea and vomiting    Severity:  Moderate  Onset quality:  Gradual  Duration:  2 weeks  Progression:  Unchanged  Chronicity:  New  Relieved by:  Nothing  Worsened by:  Nothing  Ineffective treatments:  OTC medications  Associated symptoms: chills and nasal congestion    Risk factors: sick contacts    Risk factors comment:  Complex Regional Pain Syndrome Type 1       REVIEW OF SYSTEMS  A chief complaint appropriate review of systems was completed and is negative except as noted in the HPI.     PAST MEDICAL HISTORY  Past Medical History:   Diagnosis Date    Asthma 2007    CRPS (complex regional pain syndrome type I)     Eosinophilic esophagitis     Fibromyalgia 2013    Hidradenitis suppurativa 2020    Internal hemorrhoids 2021    Migraines 2013    Morbid obesity with BMI of 45.0-49.9, adult     MRSA carrier     POTS (postural orthostatic tachycardia syndrome) 2014    Recurrent major depressive disorder, in partial remission        PAST SURGICAL HISTORY  Past Surgical History:   Procedure Laterality Date    COLONOSCOPY      D & C WITH SUCTION  2018    EAR TUBES  1999    ENDOSCOPY      LAPAROSCOPIC CHOLECYSTECTOMY  2011    TONSILLECTOMY AND ADENOIDECTOMY  2002       FAMILY HISTORY  Family History   Problem Relation Age of Onset    No Known Problems Father     Breast cancer Mother     Diabetes Mother     Cancer Paternal Grandfather     Hearing loss Paternal Grandfather     Heart attack Paternal  Grandfather     Colon cancer Maternal Grandmother     Cancer Maternal Grandmother     Diabetes Maternal Grandmother     Kidney disease Maternal Grandmother     Stroke Maternal Grandmother     Cancer Maternal Grandfather     Heart attack Maternal Grandfather     Hearing loss Maternal Grandfather     Stroke Maternal Grandfather     Ovarian cancer Neg Hx     Uterine cancer Neg Hx        SOCIAL HISTORY  Social History     Socioeconomic History    Marital status:      Spouse name: Adrian Vargas   Tobacco Use    Smoking status: Former     Current packs/day: 0.00     Average packs/day: 2.0 packs/day for 7.0 years (14.0 ttl pk-yrs)     Types: Cigarettes     Start date:      Quit date: 2017     Years since quittin.0     Passive exposure: Past    Smokeless tobacco: Never   Vaping Use    Vaping status: Former    Quit date: 2022    Substances: Nicotine    Devices: Refillable tank   Substance and Sexual Activity    Alcohol use: Not Currently     Comment: 0-1/week when not pregnant    Drug use: No    Sexual activity: Yes     Partners: Male     Birth control/protection: Vasectomy       ALLERGIES  Banana, Cantaloupe (diagnostic), Egg-derived products, Influenza virus vaccine, Mushroom, Shellfish-derived products, and Rizatriptan    PHYSICAL EXAM  Physical Exam  Vitals and nursing note reviewed.   Constitutional:       General: She is not in acute distress.     Appearance: Normal appearance. She is obese. She is ill-appearing. She is not toxic-appearing or diaphoretic.   HENT:      Head: Normocephalic and atraumatic.      Right Ear: Tympanic membrane normal.      Left Ear: Tympanic membrane normal.      Nose: Congestion and rhinorrhea present.      Mouth/Throat:      Mouth: Mucous membranes are moist.   Eyes:      Extraocular Movements: Extraocular movements intact.   Cardiovascular:      Rate and Rhythm: Regular rhythm. Tachycardia present.   Pulmonary:      Effort: Pulmonary effort is normal.      Breath  sounds: Wheezing present.   Abdominal:      General: There is no distension.      Tenderness: There is no abdominal tenderness.   Musculoskeletal:         General: Normal range of motion.      Cervical back: Normal range of motion and neck supple.   Skin:     General: Skin is warm and dry.   Neurological:      General: No focal deficit present.      Mental Status: She is alert.   Psychiatric:         Mood and Affect: Mood is anxious.         Speech: Speech normal.         Behavior: Behavior is agitated and hyperactive.         LAB RESULTS  Results for orders placed or performed during the hospital encounter of 01/16/25   COVID-19 and FLU A/B PCR, 1 HR TAT - Swab, Nasopharynx    Collection Time: 01/16/25  8:17 PM    Specimen: Nasopharynx; Swab   Result Value Ref Range    COVID19 Not Detected Not Detected - Ref. Range    Influenza A PCR Detected (A) Not Detected    Influenza B PCR Not Detected Not Detected   ECG 12 Lead Chest Pain    Collection Time: 01/16/25  8:25 PM   Result Value Ref Range    QT Interval 344 ms    QTC Interval 420 ms   Comprehensive Metabolic Panel    Collection Time: 01/16/25  9:14 PM    Specimen: Blood   Result Value Ref Range    Glucose 119 (H) 65 - 99 mg/dL    BUN 5 (L) 6 - 20 mg/dL    Creatinine 0.77 0.57 - 1.00 mg/dL    Sodium 141 136 - 145 mmol/L    Potassium 3.5 3.5 - 5.2 mmol/L    Chloride 101 98 - 107 mmol/L    CO2 26.0 22.0 - 29.0 mmol/L    Calcium 8.8 8.6 - 10.5 mg/dL    Total Protein 7.0 6.0 - 8.5 g/dL    Albumin 3.9 3.5 - 5.2 g/dL    ALT (SGPT) 47 (H) 1 - 33 U/L    AST (SGOT) 68 (H) 1 - 32 U/L    Alkaline Phosphatase 126 (H) 39 - 117 U/L    Total Bilirubin 0.4 0.0 - 1.2 mg/dL    Globulin 3.1 gm/dL    A/G Ratio 1.3 g/dL    BUN/Creatinine Ratio 6.5 (L) 7.0 - 25.0    Anion Gap 14.0 5.0 - 15.0 mmol/L    eGFR 107.2 >60.0 mL/min/1.73   Lipase    Collection Time: 01/16/25  9:14 PM    Specimen: Blood   Result Value Ref Range    Lipase 17 13 - 60 U/L   CBC Auto Differential    Collection Time:  01/16/25  9:14 PM    Specimen: Blood   Result Value Ref Range    WBC 2.50 (L) 3.40 - 10.80 10*3/mm3    RBC 4.85 3.77 - 5.28 10*6/mm3    Hemoglobin 11.7 (L) 12.0 - 15.9 g/dL    Hematocrit 37.2 34.0 - 46.6 %    MCV 76.7 (L) 79.0 - 97.0 fL    MCH 24.1 (L) 26.6 - 33.0 pg    MCHC 31.5 31.5 - 35.7 g/dL    RDW 14.5 12.3 - 15.4 %    RDW-SD 39.8 37.0 - 54.0 fl    MPV 9.7 6.0 - 12.0 fL    Platelets 225 140 - 450 10*3/mm3    Neutrophil % 58.0 42.7 - 76.0 %    Lymphocyte % 31.6 19.6 - 45.3 %    Monocyte % 6.8 5.0 - 12.0 %    Eosinophil % 2.8 0.3 - 6.2 %    Basophil % 0.4 0.0 - 1.5 %    Immature Grans % 0.4 0.0 - 0.5 %    Neutrophils, Absolute 1.45 (L) 1.70 - 7.00 10*3/mm3    Lymphocytes, Absolute 0.79 0.70 - 3.10 10*3/mm3    Monocytes, Absolute 0.17 0.10 - 0.90 10*3/mm3    Eosinophils, Absolute 0.07 0.00 - 0.40 10*3/mm3    Basophils, Absolute 0.01 0.00 - 0.20 10*3/mm3    Immature Grans, Absolute 0.01 0.00 - 0.05 10*3/mm3    nRBC 0.0 0.0 - 0.2 /100 WBC   Green Top (Gel)    Collection Time: 01/16/25  9:14 PM   Result Value Ref Range    Extra Tube Hold for add-ons.    Lavender Top    Collection Time: 01/16/25  9:14 PM   Result Value Ref Range    Extra Tube hold for add-on    Gold Top - SST    Collection Time: 01/16/25  9:14 PM   Result Value Ref Range    Extra Tube Hold for add-ons.    Gray Top    Collection Time: 01/16/25  9:14 PM   Result Value Ref Range    Extra Tube Hold for add-ons.    Light Blue Top    Collection Time: 01/16/25  9:14 PM   Result Value Ref Range    Extra Tube Hold for add-ons.        If labs were ordered, I independently reviewed the results and considered them in treating the patient.    RADIOLOGY  XR Chest 1 View   Final Result   Impression:   No radiographic evidence of acute chest process.         Electronically Signed: Rajat Gregorio MD     1/16/2025 9:02 PM EST     Workstation ID: [id number]        [x] Radiologist's Report Reviewed:  I ordered and independently interpreted the above noted radiographic  studies.  See radiologist's dictation for official interpretation.      PROCEDURES    Procedures    ECG 12 Lead Chest Pain   Preliminary Result   Test Reason : Chest Pain   Blood Pressure :   */*   mmHG   Vent. Rate :  90 BPM     Atrial Rate :  90 BPM      P-R Int : 126 ms          QRS Dur :  78 ms       QT Int : 344 ms       P-R-T Axes :  29  30  21 degrees     QTcB Int : 420 ms      Sinus rhythm with marked sinus arrhythmia   Otherwise normal ECG   When compared with ECG of 17-Apr-2019 04:28,   No significant change was found      Referred By: EDMD           Confirmed By:           MEDICATIONS GIVEN IN ER    Medications   ondansetron (ZOFRAN) injection 4 mg (4 mg Intravenous Given 1/16/25 2142)   sodium chloride 0.9 % bolus 1,000 mL (0 mL Intravenous Stopped 1/16/25 2256)   albuterol (PROVENTIL) nebulizer solution 0.083% 2.5 mg/3mL (2.5 mg Nebulization Given 1/16/25 2227)       MEDICAL DECISION MAKING, PROGRESS, and CONSULTS   Medical Decision Making  This is an ill-appearing but nontoxic-appearing female with history of fibromyalgia, POTS and complex regional pain syndrome in addition to eosinophilic esophagitis.  The ED for evaluation of persistent cough and flulike symptoms.  No acute emergent findings on physical exam.  Patient did become anxious and I was called to bedside as she reported spasms in her airway.  Patient states that this happens when she gets sick and her body responds to albuterol and that spasms of her airway are a result of her complex regional pain syndrome.  No acute respiratory distress.  Patient never was hypoxic.  Patient is very anxious and calm down when I explained to her that she had influenza.  She was treated with a breathing treatment as a result of some wheezing appreciated on physical exam.  Patient positive for influenza A with remainder of labs without acute findings.  Provided reassurance.  Discharged with symptomatic care and continued outpatient follow-up.  Return  precautions given.    Problems Addressed:  Acute viral syndrome: complicated acute illness or injury  Complex regional pain syndrome type 1 affecting other site: complicated acute illness or injury  History of fibromyalgia: complicated acute illness or injury  Influenza A: complicated acute illness or injury  POTS (postural orthostatic tachycardia syndrome): complicated acute illness or injury    Amount and/or Complexity of Data Reviewed  Labs: ordered. Decision-making details documented in ED Course.  ECG/medicine tests: ordered and independent interpretation performed. Decision-making details documented in ED Course.    Risk  Prescription drug management.      Discussion below represents my analysis of pertinent findings related to patient's condition, differential diagnosis, treatment plan and final disposition.    Differential diagnosis: Viral upper respiratory infection, bacterial pneumonia, sinusitis, allergic rhinitis, influenza, COVID-19, ACS, CHF, Asthma/COPD exacerbations  Additional sources  Discussed/ obtained information from independent historians:   [] Spouse  [] Parent  [x] Family member  [] Friend  [] EMS   [] Other:  External (non-ED) record review:   [] Inpatient record:   [] Office record:   [] Outpatient record:   [] Prior Outpatient labs:   [] Prior Outpatient radiology:   [] Primary Care record:   [] Outside ED record:   [] Other:   Patient's care impacted by:   [] Diabetes  [] Hypertension  [] Hyperlipidemia  [] Hypothyroidism   [] Coronary Artery Disease  [] Congestive Heart Failure   [] COPD   [] Cancer   [x] Obesity  [] GERD   [] Tobacco Abuse   [] Substance Abuse    [] Anxiety   [x] Depression   [x] Other: Asthma, POTS, CRPS, Fibromyalgia, eosinophilic esophagitis  Care significantly affected by Social Determinants of Health (housing and economic circumstances, unemployment)    [] Yes     [x] No   If yes, Patient's care significantly limited by  Social Determinants of Health  including:   [] Inadequate housing   [] Low income   [] Alcoholism and drug addiction in family   [] Problems related to primary support group   [] Unemployment   [] Problems related to employment   [] Other Social Determinants of Health:     Orders placed during this visit:  Orders Placed This Encounter   Procedures    COVID PRE-OP / PRE-PROCEDURE SCREENING ORDER (NO ISOLATION) - Swab, Nasopharynx    COVID-19 and FLU A/B PCR, 1 HR TAT - Swab, Nasopharynx    XR Chest 1 View    Comprehensive Metabolic Panel    Lipase    [place] Draw    CBC Auto Differential    ECG 12 Lead Chest Pain    CBC & Differential    Green Top (Gel)    Lavender Top    Gold Top - SST    Gray Top    Light Blue Top   ED Course:    ED Course as of 01/17/25 1410   Thu Jan 16, 2025 2035 I personally reviewed and interpreted labs results and went over with patient. I personally reviewed and interpreted vitals. [RT]   2035 Heart Rate: 120  Noted tachycardia  [RT]   2035 SpO2: 96 %  RA [RT]   2035 BP: 140/96 [RT]   2035 Temp: 98.7 °F (37.1 °C) [RT]   2113 Labs studies were reviewed and directly interpreted by myself and demonstrated nasopharyngeal swab positive for influenza A [JG]   2119 Imaging of chest personally interpreted by myself with official read provided by radiology demonstrated no acute cardiopulmonary process.   [JG]   2119 EKG personally interpreted by myself in addition to interpretation by attending without evidence of acute ischemic changes; sinus rhythm [JG]   2142 Influenza A PCR(!): Detected [JG]      ED Course User Index  [JG] Farhad Mg PA  [RT] Yenifer Ramachandran PA          DIAGNOSIS  Final diagnoses:   Influenza A   Acute viral syndrome   Complex regional pain syndrome type 1 affecting other site   History of fibromyalgia   POTS (postural orthostatic tachycardia syndrome)     DISPOSITION    ED Disposition       ED Disposition   Discharge    Condition   Stable    Comment   --             DISCHARGE    Patient discharged in  stable condition.    Reviewed implications of results, diagnosis, meds, responsibility to follow up, warning signs and symptoms of possible worsening, potential complications and reasons to return to ER.    Patient/Family voiced understanding of above instructions.    Discussed plan for discharge, as there is no emergent indication for admission.  Pt/family is agreeable and understands need for follow up and possible repeat testing.  Pt/family is aware that discharge does not mean that nothing is wrong but that it indicates no emergency is currently present that requires admission and they must continue care with follow-up as given below or with a physician of their choice.     FOLLOW-UP  Eb Prabhakar PA-C  [address] [address]  [phone]    Call   Call for follow up with primary care    [facility] EMERGENCY DEPARTMENT  [address] [facility] [id number] [phone]  Go to   If symptoms worsen         Medication List        New Prescriptions      benzonatate 100 MG capsule  Commonly known as: TESSALON  Take 1 capsule by mouth 3 (Three) Times a Day As Needed for Cough for up to 5 days.     promethazine-dextromethorphan 6.25-15 MG/5ML syrup  Commonly known as: PROMETHAZINE-DM  Take 5 mL by mouth 4 (Four) Times a Day As Needed for Cough for up to 5 days.               Where to Get Your Medications        These medications were sent to [facility] - [place], [place] - [id number] - [phone]  - [phone]  [id number], [facility] [address]      Phone: [phone]   benzonatate 100 MG capsule  promethazine-dextromethorphan 6.25-15 MG/5ML syrup        Please note that portions of this document were completed with voice recognition software.        Farhad Mg PA  01/17/25 [id number]

## 2025-01-19 LAB
QT INTERVAL: 344 MS
QTC INTERVAL: 420 MS

## 2025-01-20 RX ORDER — CITALOPRAM HYDROBROMIDE 20 MG/1
20 TABLET ORAL
Qty: 30 TABLET | Refills: 0 | Status: SHIPPED | OUTPATIENT
Start: 2025-01-20

## 2025-01-20 NOTE — TELEPHONE ENCOUNTER
Rx Refill Note  Requested Prescriptions     Pending Prescriptions Disp Refills    citalopram (CeleXA) 20 MG tablet [Pharmacy Med Name: CITALOPRAM HBR 20 MG TABLET] 90 tablet 0     Sig: TAKE 1 TABLET BY MOUTH EVERY NIGHT AT BEDTIME      Last office visit with prescribing clinician: 10/19/2023   Next office visit with prescribing clinician: Return if symptoms worsen or fail to improve.     Parisa Anderson MA  01/20/25, 11:29 EST

## 2025-04-03 RX ORDER — CITALOPRAM HYDROBROMIDE 20 MG/1
20 TABLET ORAL
Qty: 30 TABLET | Refills: 0 | Status: SHIPPED | OUTPATIENT
Start: 2025-04-03

## 2025-04-03 RX ORDER — CETIRIZINE HYDROCHLORIDE 10 MG/1
10 TABLET ORAL DAILY
Qty: 30 TABLET | Refills: 0 | Status: SHIPPED | OUTPATIENT
Start: 2025-04-03

## 2025-04-03 NOTE — TELEPHONE ENCOUNTER
Rx Refill Note  Requested Prescriptions     Pending Prescriptions Disp Refills    cetirizine (zyrTEC) 10 MG tablet 90 tablet 3     Sig: Take 1 tablet by mouth Daily.    citalopram (CeleXA) 20 MG tablet 30 tablet 0     Sig: Take 1 tablet by mouth every night at bedtime.      Last office visit with prescribing clinician: Visit date not found   Last telemedicine visit with prescribing clinician: Visit date not found   Next office visit with prescribing clinician: Visit date not found                         Would you like a call back once the refill request has been completed: [] Yes [] No    If the office needs to give you a call back, can they leave a voicemail: [] Yes [] No    Lolita Villegas MA  04/03/25, 14:05 EDT   No

## 2025-04-04 RX ORDER — FLUDROCORTISONE ACETATE 0.1 MG/1
0.1 TABLET ORAL
Qty: 30 TABLET | Refills: 0 | Status: SHIPPED | OUTPATIENT
Start: 2025-04-04

## 2025-04-04 RX ORDER — BISOPROLOL FUMARATE 10 MG/1
10 TABLET, FILM COATED ORAL DAILY
Qty: 30 TABLET | Refills: 0 | Status: SHIPPED | OUTPATIENT
Start: 2025-04-04

## 2025-04-22 ENCOUNTER — OFFICE VISIT (OUTPATIENT)
Dept: FAMILY MEDICINE CLINIC | Facility: CLINIC | Age: 30
End: 2025-04-22
Payer: COMMERCIAL

## 2025-04-22 ENCOUNTER — LAB (OUTPATIENT)
Dept: LAB | Facility: HOSPITAL | Age: 30
End: 2025-04-22
Payer: COMMERCIAL

## 2025-04-22 VITALS
OXYGEN SATURATION: 97 % | DIASTOLIC BLOOD PRESSURE: 84 MMHG | TEMPERATURE: 97.6 F | BODY MASS INDEX: 41.02 KG/M2 | SYSTOLIC BLOOD PRESSURE: 130 MMHG | HEART RATE: 97 BPM | WEIGHT: 293 LBS | HEIGHT: 71 IN

## 2025-04-22 DIAGNOSIS — J00 NASOPHARYNGITIS ACUTE: ICD-10-CM

## 2025-04-22 DIAGNOSIS — H92.03 ACUTE EAR PAIN, BILATERAL: ICD-10-CM

## 2025-04-22 DIAGNOSIS — J02.9 SORE THROAT: ICD-10-CM

## 2025-04-22 DIAGNOSIS — J00 NASOPHARYNGITIS ACUTE: Primary | ICD-10-CM

## 2025-04-22 LAB
EXPIRATION DATE: NORMAL
EXPIRATION DATE: NORMAL
FLUAV AG UPPER RESP QL IA.RAPID: NOT DETECTED
FLUBV AG UPPER RESP QL IA.RAPID: NOT DETECTED
HETEROPH AB SER QL LA: NEGATIVE
INTERNAL CONTROL: NORMAL
INTERNAL CONTROL: NORMAL
Lab: NORMAL
Lab: NORMAL
S PYO AG THROAT QL: NEGATIVE
SARS-COV-2 AG UPPER RESP QL IA.RAPID: NOT DETECTED

## 2025-04-22 PROCEDURE — 86308 HETEROPHILE ANTIBODY SCREEN: CPT

## 2025-04-22 RX ORDER — PREDNISONE 20 MG/1
20 TABLET ORAL EVERY MORNING
Qty: 5 TABLET | Refills: 0 | Status: SHIPPED | OUTPATIENT
Start: 2025-04-22 | End: 2025-04-27

## 2025-04-22 NOTE — PROGRESS NOTES
"    Chief Complaint   Patient presents with    Sore Throat     SORE THROAT, EARS HURT, LOW GRADE FEVER SINCE 4/21/25.  Congestion        HPI     Ammon Vargas is a pleasant 29 y.o. female who presents for evaluation of \"chief complaint.\"     C/o new onset sore throat starting yesterday morning when she woke up. Max temp 101 last night came down with aleve then returned this morning. She also reports nasal congestion, bilateral ear pain R>L, body aches last night. Denies cough or shortness of breath. Had been to [place] [employer] and went to family gathering over the weekend but denies known sick contacts. She had recurrent strep throat when she was younger but has not had problems s/p tonsillectomy.     Past Medical History:   Diagnosis Date    Asthma 2007    CRPS (complex regional pain syndrome type I) 2013    Eosinophilic esophagitis 2021    Fibromyalgia 2013    Hidradenitis suppurativa 03/20/2020    Internal hemorrhoids 02/22/2021    Migraines 2013    Morbid obesity with BMI of 45.0-49.9, adult 2022    MRSA carrier     POTS (postural orthostatic tachycardia syndrome) 2014    Recurrent major depressive disorder, in partial remission 2013       Past Surgical History:   Procedure Laterality Date    COLONOSCOPY  2020    D & C WITH SUCTION  07/2018    EAR TUBES  1999    ENDOSCOPY      LAPAROSCOPIC CHOLECYSTECTOMY  2011    TONSILLECTOMY AND ADENOIDECTOMY  2002       Family History   Problem Relation Age of Onset    No Known Problems Father     Breast cancer Mother     Diabetes Mother     Cancer Paternal Grandfather     Hearing loss Paternal Grandfather     Heart attack Paternal Grandfather     Colon cancer Maternal Grandmother     Cancer Maternal Grandmother     Diabetes Maternal Grandmother     Kidney disease Maternal Grandmother     Stroke Maternal Grandmother     Cancer Maternal Grandfather     Heart attack Maternal Grandfather     Hearing loss Maternal Grandfather     Stroke Maternal Grandfather     Ovarian " cancer Neg Hx     Uterine cancer Neg Hx        Social History     Socioeconomic History    Marital status:      Spouse name: Adrian Vargas   Tobacco Use    Smoking status: Former     Current packs/day: 0.00     Average packs/day: 2.0 packs/day for 7.0 years (14.0 ttl pk-yrs)     Types: Cigarettes     Start date:      Quit date:      Years since quittin.3     Passive exposure: Past    Smokeless tobacco: Never   Vaping Use    Vaping status: Former    Quit date: 2022    Substances: Nicotine    Devices: Refillable tank   Substance and Sexual Activity    Alcohol use: Not Currently     Comment: 0-1/week when not pregnant    Drug use: No    Sexual activity: Yes     Partners: Male     Birth control/protection: Vasectomy       Allergies   Allergen Reactions    Banana Anaphylaxis    Cantaloupe (Diagnostic) Hives    Egg-Derived Products Anaphylaxis, Hives and Swelling    Influenza Virus Vaccine Other (See Comments)     Unknown but has an allergy to eggs    Mushroom Hives    Shellfish-Derived Products Hives    Rizatriptan Rash       ROS    Review of Systems   Constitutional:  Positive for chills and fever.   HENT:  Positive for congestion. Negative for rhinorrhea.    Respiratory:  Negative for cough and shortness of breath.    Cardiovascular:  Negative for chest pain.   Musculoskeletal:  Positive for myalgias.   Neurological:  Negative for headache.       Vitals:    25 1315   BP: 130/84   Pulse: 97   Temp: 97.6 °F (36.4 °C)   SpO2: 97%     Body mass index is 44.68 kg/m².      Current Outpatient Medications:     albuterol sulfate  (90 Base) MCG/ACT inhaler, Inhale 2 puffs Every 4 (Four) Hours As Needed for Wheezing or Shortness of Air., Disp: 8 g, Rfl: 1    bisoprolol (ZEBeta) 10 MG tablet, Take 1 tablet by mouth Daily., Disp: 30 tablet, Rfl: 0    cetirizine (zyrTEC) 10 MG tablet, Take 1 tablet by mouth Daily. MUST SCHEDULE APPOINTMENT FOR REFILLS., Disp: 30 tablet, Rfl: 0    citalopram  (CeleXA) 20 MG tablet, Take 1 tablet by mouth every night at bedtime. MUST SCHEDULE APPOINTMENT FOR REFILLS., Disp: 30 tablet, Rfl: 0    fludrocortisone 0.1 MG tablet, Take 1 tablet by mouth Daily With Breakfast., Disp: 30 tablet, Rfl: 0    fluticasone (FLONASE) 50 MCG/ACT nasal spray, 1 spray into the nostril(s) as directed by provider Daily., Disp: 9.9 mL, Rfl: 11    Probiotic Product (PROBIOTIC PO), Take  by mouth., Disp: , Rfl:     amoxicillin-clavulanate (AUGMENTIN) 875-125 MG per tablet, Take 1 tablet by mouth 2 (Two) Times a Day for 7 days., Disp: 14 tablet, Rfl: 0    doxepin (SINEquan) 50 MG capsule, Take 1 capsule by mouth 2 (Two) Times a Day. (Patient not taking: Reported on 4/22/2025), Disp: 14 capsule, Rfl: 0    famotidine (PEPCID) 20 MG tablet, Take 1 tablet by mouth 2 (Two) Times a Day As Needed (itching). (Patient not taking: Reported on 4/22/2025), Disp: 30 tablet, Rfl: 0    predniSONE (DELTASONE) 20 MG tablet, Take 1 tablet by mouth Every Morning for 5 days., Disp: 5 tablet, Rfl: 0    PE    Physical Exam  Vitals reviewed.   Constitutional:       General: She is not in acute distress.     Appearance: She is well-developed.   HENT:      Head: Normocephalic.      Right Ear: Ear canal normal. Tympanic membrane is erythematous.      Left Ear: Tympanic membrane and ear canal normal. Tympanic membrane is not erythematous.      Ears:        Nose:      Right Sinus: No maxillary sinus tenderness or frontal sinus tenderness.      Left Sinus: No maxillary sinus tenderness or frontal sinus tenderness.      Mouth/Throat:      Mouth: Mucous membranes are moist.      Pharynx: Uvula midline. Posterior oropharyngeal erythema present.      Tonsils: Tonsillar exudate present.      Comments: Mild bilateral white exudate in tonsillar regions.   Eyes:      General:         Right eye: No discharge.         Left eye: No discharge.      Conjunctiva/sclera: Conjunctivae normal.   Cardiovascular:      Rate and Rhythm: Normal  rate and regular rhythm.      Heart sounds: Normal heart sounds.   Pulmonary:      Effort: Pulmonary effort is normal. No respiratory distress.      Breath sounds: Normal breath sounds. No wheezing, rhonchi or rales.   Musculoskeletal:      Cervical back: Normal range of motion and neck supple.   Lymphadenopathy:      Cervical: No cervical adenopathy.      Upper Body:      Right upper body: No supraclavicular adenopathy.      Left upper body: No supraclavicular adenopathy.   Skin:     General: Skin is warm and dry.   Psychiatric:         Behavior: Behavior normal.          A/P    Problem List Items Addressed This Visit    None  Visit Diagnoses         Nasopharyngitis acute    -  Primary    Relevant Orders    Mononucleosis Screen      Acute ear pain, bilateral          Sore throat        Relevant Orders    POCT SARS-CoV-2 + Flu Antigen [facility] (Completed)    POC Rapid Strep A (Completed)          -Strep, influenza, and COVID tests negative.   -Exam remarkable for bilateral white exudate and mild right tympanic membrane erythema.  The patient reports she does have a history of ear infections.  Plan is to start a 1 week course of Augmentin to cover for otitis media and potential false negative strep swab. The patient also denies a known history of mononucleosis. Check Monospot. Given degree of throat discomfort, start low-dose prednisone burst for inflammation.   -We discussed OTC symptomatic and supportive measures as well.  -Return to clinic if symptoms worsen or persist.    Plan of care was reviewed with patient at the conclusion of today's visit. Education was provided regarding diagnoses, management, prescribed or recommended OTC products, and the importance of compliance with follow-up appointments. The patient was counseled regarding the risks, benefits, and possible side-effects of treatment. I advised the patient to keep me informed of any acute changes in their status including new, worsening, or persistent  symptoms. Patient expresses understanding and agreement with the management plan.        Eb Prabhakar PA-C

## 2025-04-22 NOTE — PATIENT INSTRUCTIONS
"Attain adequate rest and increase clear fluid intake.   Start vitamin C 500 mg daily.   Use Zinc lozenges or chewable tablets (generic for Zicam) in the early course of your illness.   Practice regular hand hygiene and cover your cough to help prevent spread of infection  Use warm salt water gargles, Cepacol lozenges, and hot tea with honey as needed for throat comfort.   Use Mucinex 600 mg twice daily and \"ocean\" or \"simply saline\" brand sterile nasal spray twice daily as needed for congestion.   Use warm compresses over sinuses for comfort as needed  Alternate taking Tylenol 500 mg every 4 hours as needed for headache, body aches, throat pain, and/or fever reduction  Please return if symptoms worsen or persist for more than 7-10 days.    "

## 2025-05-01 RX ORDER — CETIRIZINE HYDROCHLORIDE 10 MG/1
10 TABLET ORAL DAILY
Qty: 90 TABLET | Refills: 0 | Status: SHIPPED | OUTPATIENT
Start: 2025-05-01

## 2025-05-01 RX ORDER — CITALOPRAM HYDROBROMIDE 20 MG/1
20 TABLET ORAL
Qty: 90 TABLET | Refills: 0 | Status: SHIPPED | OUTPATIENT
Start: 2025-05-01

## 2025-05-01 RX ORDER — FLUDROCORTISONE ACETATE 0.1 MG/1
0.1 TABLET ORAL
Qty: 30 TABLET | Refills: 0 | Status: SHIPPED | OUTPATIENT
Start: 2025-05-01

## 2025-05-01 RX ORDER — BISOPROLOL FUMARATE 10 MG/1
10 TABLET, FILM COATED ORAL DAILY
Qty: 30 TABLET | Refills: 0 | Status: SHIPPED | OUTPATIENT
Start: 2025-05-01

## 2025-05-27 RX ORDER — BISOPROLOL FUMARATE 10 MG/1
10 TABLET, FILM COATED ORAL DAILY
Qty: 30 TABLET | Refills: 5 | Status: SHIPPED | OUTPATIENT
Start: 2025-05-27

## 2025-05-27 RX ORDER — CETIRIZINE HYDROCHLORIDE 10 MG/1
10 TABLET ORAL DAILY
Qty: 90 TABLET | Refills: 0 | Status: SHIPPED | OUTPATIENT
Start: 2025-05-27

## 2025-05-27 RX ORDER — FLUDROCORTISONE ACETATE 0.1 MG/1
0.1 TABLET ORAL
Qty: 30 TABLET | Refills: 5 | Status: SHIPPED | OUTPATIENT
Start: 2025-05-27

## 2025-05-27 RX ORDER — CITALOPRAM HYDROBROMIDE 20 MG/1
20 TABLET ORAL
Qty: 90 TABLET | Refills: 0 | Status: SHIPPED | OUTPATIENT
Start: 2025-05-27

## 2025-05-27 NOTE — TELEPHONE ENCOUNTER
Rx Refill Note  Requested Prescriptions     Pending Prescriptions Disp Refills    cetirizine (zyrTEC) 10 MG tablet 90 tablet 0     Sig: Take 1 tablet by mouth Daily. MUST SCHEDULE APPOINTMENT FOR REFILLS.    citalopram (CeleXA) 20 MG tablet 90 tablet 0     Sig: Take 1 tablet by mouth every night at bedtime. MUST SCHEDULE APPOINTMENT FOR REFILLS.      Last office visit with prescribing clinician: 4/22/2025   Last telemedicine visit with prescribing clinician: Visit date not found   Next office visit with prescribing clinician: Visit date not found       Yenifer Payton MA  05/27/25, 13:20 EDT

## 2025-08-27 ENCOUNTER — OFFICE VISIT (OUTPATIENT)
Dept: CARDIOLOGY | Facility: CLINIC | Age: 30
End: 2025-08-27
Payer: COMMERCIAL

## 2025-08-27 VITALS
OXYGEN SATURATION: 98 % | SYSTOLIC BLOOD PRESSURE: 112 MMHG | HEART RATE: 80 BPM | BODY MASS INDEX: 39.68 KG/M2 | WEIGHT: 293 LBS | DIASTOLIC BLOOD PRESSURE: 78 MMHG | HEIGHT: 72 IN

## 2025-08-27 DIAGNOSIS — I47.11 INAPPROPRIATE SINUS NODE TACHYCARDIA: Primary | ICD-10-CM

## 2025-08-27 DIAGNOSIS — G90.A POTS (POSTURAL ORTHOSTATIC TACHYCARDIA SYNDROME): ICD-10-CM

## 2025-08-27 PROCEDURE — 99214 OFFICE O/P EST MOD 30 MIN: CPT | Performed by: PHYSICIAN ASSISTANT

## 2025-08-27 RX ORDER — BISOPROLOL FUMARATE 10 MG/1
10 TABLET, FILM COATED ORAL DAILY
Qty: 30 TABLET | Refills: 5 | Status: SHIPPED | OUTPATIENT
Start: 2025-08-27